# Patient Record
Sex: MALE | Race: WHITE | NOT HISPANIC OR LATINO | Employment: FULL TIME | URBAN - METROPOLITAN AREA
[De-identification: names, ages, dates, MRNs, and addresses within clinical notes are randomized per-mention and may not be internally consistent; named-entity substitution may affect disease eponyms.]

---

## 2018-11-21 ENCOUNTER — OFFICE VISIT (OUTPATIENT)
Dept: HEMATOLOGY/ONCOLOGY | Facility: CLINIC | Age: 53
End: 2018-11-21
Payer: COMMERCIAL

## 2018-11-21 VITALS
DIASTOLIC BLOOD PRESSURE: 89 MMHG | TEMPERATURE: 98 F | RESPIRATION RATE: 20 BRPM | HEART RATE: 85 BPM | BODY MASS INDEX: 28.08 KG/M2 | SYSTOLIC BLOOD PRESSURE: 129 MMHG | HEIGHT: 69 IN | WEIGHT: 189.63 LBS

## 2018-11-21 DIAGNOSIS — C61 PROSTATE CANCER METASTATIC TO INTRAPELVIC LYMPH NODE: ICD-10-CM

## 2018-11-21 DIAGNOSIS — C77.5 PROSTATE CANCER METASTATIC TO INTRAPELVIC LYMPH NODE: ICD-10-CM

## 2018-11-21 PROCEDURE — 99205 OFFICE O/P NEW HI 60 MIN: CPT | Mod: S$GLB,,, | Performed by: INTERNAL MEDICINE

## 2018-11-21 PROCEDURE — 99999 PR PBB SHADOW E&M-NEW PATIENT-LVL III: CPT | Mod: PBBFAC,,, | Performed by: INTERNAL MEDICINE

## 2018-11-21 RX ORDER — ABIRATERONE ACETATE 500 MG/1
TABLET, FILM COATED ORAL
COMMUNITY
Start: 2018-11-19 | End: 2019-04-03 | Stop reason: SDUPTHER

## 2018-11-21 RX ORDER — PREDNISONE 5 MG/1
TABLET ORAL
COMMUNITY
Start: 2018-10-26 | End: 2019-04-03 | Stop reason: SDUPTHER

## 2018-11-21 RX ORDER — TAMSULOSIN HYDROCHLORIDE 0.4 MG/1
CAPSULE ORAL
Refills: 11 | Status: ON HOLD | COMMUNITY
Start: 2018-11-12 | End: 2020-01-01

## 2018-11-21 NOTE — PROGRESS NOTES
"PATIENT: Martin Alves  MRN: [id number]  DATE: 11/21/2018        Chief Complaint: No chief complaint on file.      Oncologic History:      Oncologic History See below    Oncologic Treatment     Pathology       - 2015 having difficulty urinating- urgency and frequency  Saw Dr. Monson  PSA= 4.6 ng/ml and placed on Flomax  Advised to recheck in 1 month and it was 3.9 ng/ml  - early 2016 PSA- 2/16 = 7.8 ng/ml  - 2/16 prostate biopsy- told very aggressive Erika=9  Placed on lupron q 3 months at that time (last 10/18, due again 1/19)  MRI prostate  CT scans- seen in LNs and base of bladder  Bone scan- negative  - told inoperable  - referred to Dr. Kirkland, medical oncology  - port placed 3/21/16  Initiated Taxotere q 3 weeks x 6 (started on 3/25/16)  - PSA dropped significantly   - plan was to rescan and check PSA after 3 cycles- delays with insurance so done instead after 2 chemotherapy treatments in 5/19/2016  Told "cancer 98% dead" Lns back to normal size  - 5/26/18 PSA= 0.01 ng/ml  - 5/27/18 3rd cycle of chemo began  - tolerated chemotherapy well - did receive neupogen x 3 days post  - completed 6 cycles 7/29/16  - was doing scans q 6 months and blood work every 3 months  - Dr. Hoskins left [facility] May 2017- notified by letter  - Dr. Costello-   - 8/17 scans and labs stable  - Dr. Sethi was assigned most recently as medical oncologist  - July 2018 PSA= 1.68 ng/ml  - 10/1518 CT scans and bone scan  - 10/22/18 saw Dr. Sethi  PSA 1.58 ng/ml  LNs increased slightly on scans  Bone scan negative  Advised him to start Zytiga, started 11/6/18 (with Prednisone)  - concerned about medication and side effects  Feels like he did well on chemotherapy  Concerned about MD turnover  Sent to a chemo class and questions why for this regimen and then got a bill for a co-pay  - here for a second opinion    HM:  Colonoscopy neg 1 yr ago      Subjective:   Interval History: Mr. Alves is a 53 y.o. male who returns for new " evaluation and treatment of metastatic prostate cancer.     Past Medical History: History reviewed. No pertinent past medical history.    Past Surgical HIstory: History reviewed. No pertinent surgical history.    Family History: History reviewed. No pertinent family history.    Social History:      Allergies:  Review of patient's allergies indicates:  No Known Allergies    Medications:  No current outpatient medications on file.     No current facility-administered medications for this visit.    Zytiga and Prednisone    Review of Systems   Constitutional: Negative for activity change, appetite change, chills, fatigue, fever and unexpected weight change.   HENT: Negative for dental problem, hearing loss, mouth sores, postnasal drip, rhinorrhea, sinus pressure, sore throat, tinnitus, trouble swallowing and voice change.    Eyes: Negative for visual disturbance.   Respiratory: Negative for cough, shortness of breath and wheezing.    Cardiovascular: Negative for chest pain, palpitations and leg swelling.   Gastrointestinal: Negative for abdominal distention, abdominal pain, blood in stool, constipation, diarrhea, nausea and vomiting.   Endocrine: Negative for cold intolerance and heat intolerance.   Genitourinary: Negative for decreased urine volume, difficulty urinating, dysuria, frequency, hematuria and urgency.   Musculoskeletal: Negative for arthralgias, back pain, gait problem, joint swelling, myalgias, neck pain and neck stiffness.   Skin: Negative for color change, pallor, rash and wound.   Neurological: Negative for dizziness, weakness, light-headedness, numbness and headaches.   Hematological: Negative for adenopathy. Does not bruise/bleed easily.   Psychiatric/Behavioral: Negative for decreased concentration, dysphoric mood and sleep disturbance. The patient is not nervous/anxious.        ECOG Performance Status:   0    Objective:      Vitals: There were no vitals filed for this visit.  BMI: There is no height  or weight on file to calculate BMI.    Physical Exam   Constitutional: He is oriented to person, place, and time. He appears well-developed and well-nourished. No distress.   Presents with his wife   AARON:   Head: Normocephalic and atraumatic.   Eyes: Conjunctivae and EOM are normal. Pupils are equal, round, and reactive to light. Right eye exhibits no discharge. Left eye exhibits no discharge. No scleral icterus.   Neck: Normal range of motion. Neck supple. No tracheal deviation present. No thyromegaly present.   Cardiovascular: Normal rate, regular rhythm, normal heart sounds and intact distal pulses. Exam reveals no gallop and no friction rub.   No murmur heard.  Pulmonary/Chest: Effort normal and breath sounds normal. No respiratory distress. He has no wheezes. He has no rales. He exhibits no tenderness.   Abdominal: Soft. Bowel sounds are normal. He exhibits no distension and no mass. There is no tenderness. There is no rebound and no guarding.   No organomegaly   Musculoskeletal: Normal range of motion. He exhibits no edema, tenderness or deformity.   Lymphadenopathy:     He has no cervical adenopathy.   Neurological: He is alert and oriented to person, place, and time. He has normal reflexes. No cranial nerve deficit. He exhibits normal muscle tone. Coordination normal.   Skin: Skin is warm and dry. No rash noted. He is not diaphoretic. No erythema. No pallor.   Psychiatric: He has a normal mood and affect. His behavior is normal. Judgment and thought content normal.   Nursing note and vitals reviewed.    Laboratory Data:  No visits with results within 1 Week(s) from this visit.   Latest known visit with results is:   No results found for any previous visit.     He had some drawn yesterday at [email or web address] and will get us the results    Imaging: discussed as above  Assessment:     1. Prostate cancer metastatic to intrapelvic lymph node          Plan:     We reviewed hormonally sensitive versus hormonally resistant  prostate cancer  We reviewed appropriateness of therapy to date  He had early Taxotere exposure and an excellent response  Now with slowly rising PSA he has been started on Zytiga  We reviewed appropriateness of this choice and follow up  Side effects both short term and long term reviewed    Distress Screening Results: Psychosocial Distress screening score of Distress Score: 0 noted and reviewed. No intervention indicated.

## 2018-12-17 ENCOUNTER — TELEPHONE (OUTPATIENT)
Dept: HEMATOLOGY/ONCOLOGY | Facility: CLINIC | Age: 53
End: 2018-12-17

## 2018-12-17 NOTE — TELEPHONE ENCOUNTER
----- Message from Denisa Couch NP sent at 12/17/2018  2:14 PM CST -----  Contact: patient  Should be but let him know I will not have results for 2 weeks.     ----- Message -----  From: Tova Ortiz  Sent: 12/17/2018   1:50 PM  To: Denisa Couch NP    He is asking about his DEXA results. He is scheduled at 8 for it and seeing you at 10am. Normally that is enough time, right ?  ----- Message -----  From: Denisa Couch NP  Sent: 12/17/2018   1:05 PM  To: Tova Ortiz    Did you get this?    ----- Message -----  From: Kimmy Gold  Sent: 12/17/2018  10:23 AM  To: Denisa Couch NP    Patient needs to speak with the nurse about rescheduling his appointment with Ms Couch tomorrow he says they may not have results from test on the same day he was not sure of that.  Patient's # is [phone]

## 2018-12-17 NOTE — TELEPHONE ENCOUNTER
Returned call, spoke with patient and explained to him that the bone density results can be done that day or take up to 2 weeks depending on that department. He voiced understanding and decided just to keep apts as is.

## 2018-12-18 ENCOUNTER — OFFICE VISIT (OUTPATIENT)
Dept: HEMATOLOGY/ONCOLOGY | Facility: CLINIC | Age: 53
End: 2018-12-18
Payer: COMMERCIAL

## 2018-12-18 ENCOUNTER — HOSPITAL ENCOUNTER (OUTPATIENT)
Dept: RADIOLOGY | Facility: CLINIC | Age: 53
Discharge: HOME | End: 2018-12-18
Attending: INTERNAL MEDICINE
Payer: COMMERCIAL

## 2018-12-18 ENCOUNTER — LAB VISIT (OUTPATIENT)
Dept: LAB | Facility: HOSPITAL | Age: 53
End: 2018-12-18
Attending: INTERNAL MEDICINE
Payer: COMMERCIAL

## 2018-12-18 VITALS
HEART RATE: 68 BPM | WEIGHT: 189.38 LBS | RESPIRATION RATE: 20 BRPM | SYSTOLIC BLOOD PRESSURE: 126 MMHG | TEMPERATURE: 98 F | DIASTOLIC BLOOD PRESSURE: 82 MMHG | HEIGHT: 69 IN | BODY MASS INDEX: 28.05 KG/M2 | OXYGEN SATURATION: 96 %

## 2018-12-18 DIAGNOSIS — C61 PROSTATE CANCER METASTATIC TO INTRAPELVIC LYMPH NODE: ICD-10-CM

## 2018-12-18 DIAGNOSIS — C61 PROSTATE CANCER METASTATIC TO INTRAPELVIC LYMPH NODE: Primary | ICD-10-CM

## 2018-12-18 DIAGNOSIS — C77.5 PROSTATE CANCER METASTATIC TO INTRAPELVIC LYMPH NODE: ICD-10-CM

## 2018-12-18 DIAGNOSIS — C77.5 PROSTATE CANCER METASTATIC TO INTRAPELVIC LYMPH NODE: Primary | ICD-10-CM

## 2018-12-18 LAB
ALBUMIN SERPL BCP-MCNC: 3.8 G/DL
ALP SERPL-CCNC: 99 U/L
ALT SERPL W/O P-5'-P-CCNC: 32 U/L
ANION GAP SERPL CALC-SCNC: 8 MMOL/L
AST SERPL-CCNC: 22 U/L
BASOPHILS # BLD AUTO: 0.06 K/UL
BASOPHILS NFR BLD: 0.5 %
BILIRUB SERPL-MCNC: 0.6 MG/DL
BUN SERPL-MCNC: 13 MG/DL
CALCIUM SERPL-MCNC: 9.7 MG/DL
CHLORIDE SERPL-SCNC: 104 MMOL/L
CO2 SERPL-SCNC: 28 MMOL/L
COMPLEXED PSA SERPL-MCNC: 0.17 NG/ML
CREAT SERPL-MCNC: 0.8 MG/DL
DIFFERENTIAL METHOD: ABNORMAL
EOSINOPHIL # BLD AUTO: 0.4 K/UL
EOSINOPHIL NFR BLD: 3.1 %
ERYTHROCYTE [DISTWIDTH] IN BLOOD BY AUTOMATED COUNT: 15.8 %
EST. GFR  (AFRICAN AMERICAN): >60 ML/MIN/1.73 M^2
EST. GFR  (NON AFRICAN AMERICAN): >60 ML/MIN/1.73 M^2
GLUCOSE SERPL-MCNC: 81 MG/DL
HCT VFR BLD AUTO: 43.5 %
HGB BLD-MCNC: 14.5 G/DL
IMM GRANULOCYTES # BLD AUTO: 0.05 K/UL
IMM GRANULOCYTES NFR BLD AUTO: 0.4 %
LYMPHOCYTES # BLD AUTO: 2.9 K/UL
LYMPHOCYTES NFR BLD: 23.3 %
MCH RBC QN AUTO: 28.7 PG
MCHC RBC AUTO-ENTMCNC: 33.3 G/DL
MCV RBC AUTO: 86 FL
MONOCYTES # BLD AUTO: 1.1 K/UL
MONOCYTES NFR BLD: 8.6 %
NEUTROPHILS # BLD AUTO: 8 K/UL
NEUTROPHILS NFR BLD: 64.1 %
NRBC BLD-RTO: 0 /100 WBC
PLATELET # BLD AUTO: 259 K/UL
PMV BLD AUTO: 10.3 FL
POTASSIUM SERPL-SCNC: 4.1 MMOL/L
PROT SERPL-MCNC: 6.8 G/DL
RBC # BLD AUTO: 5.05 M/UL
SODIUM SERPL-SCNC: 140 MMOL/L
WBC # BLD AUTO: 12.41 K/UL

## 2018-12-18 PROCEDURE — 99999 PR PBB SHADOW E&M-EST. PATIENT-LVL III: CPT | Mod: PBBFAC,,, | Performed by: NURSE PRACTITIONER

## 2018-12-18 PROCEDURE — 77080 DXA BONE DENSITY AXIAL: CPT | Mod: TC

## 2018-12-18 PROCEDURE — 80053 COMPREHEN METABOLIC PANEL: CPT

## 2018-12-18 PROCEDURE — 85025 COMPLETE CBC W/AUTO DIFF WBC: CPT

## 2018-12-18 PROCEDURE — 3008F BODY MASS INDEX DOCD: CPT | Mod: CPTII,S$GLB,, | Performed by: NURSE PRACTITIONER

## 2018-12-18 PROCEDURE — 84153 ASSAY OF PSA TOTAL: CPT

## 2018-12-18 PROCEDURE — 36415 COLL VENOUS BLD VENIPUNCTURE: CPT

## 2018-12-18 PROCEDURE — 77080 DXA BONE DENSITY AXIAL: CPT | Mod: 26,,, | Performed by: INTERNAL MEDICINE

## 2018-12-18 PROCEDURE — 99214 OFFICE O/P EST MOD 30 MIN: CPT | Mod: S$GLB,,, | Performed by: NURSE PRACTITIONER

## 2018-12-18 NOTE — PROGRESS NOTES
"PATIENT: Martin Alves  MRN: [id number]  DATE: 12/18/2018        Chief Complaint: Prostate cancer metastatic to intrapelvic lymph node      Oncologic History:      Oncologic History See below    Oncologic Treatment     Pathology       - 2015 having difficulty urinating- urgency and frequency  Saw Dr. Monson  PSA= 4.6 ng/ml and placed on Flomax  Advised to recheck in 1 month and it was 3.9 ng/ml  - early 2016 PSA- 2/16 = 7.8 ng/ml  - 2/16 prostate biopsy- told very aggressive Erika=9  Placed on lupron q 3 months at that time (last 10/18, due again 1/19)  MRI prostate  CT scans- seen in LNs and base of bladder  Bone scan- negative  - told inoperable  - referred to Dr. Kirkland, medical oncology  - port placed 3/21/16  Initiated Taxotere q 3 weeks x 6 (started on 3/25/16)  - PSA dropped significantly   - plan was to rescan and check PSA after 3 cycles- delays with insurance so done instead after 2 chemotherapy treatments in 5/19/2016  Told "cancer 98% dead" Lns back to normal size  - 5/26/18 PSA= 0.01 ng/ml  - 5/27/18 3rd cycle of chemo began  - tolerated chemotherapy well - did receive neupogen x 3 days post  - completed 6 cycles 7/29/16  - was doing scans q 6 months and blood work every 3 months  - Dr. Hoskins left [facility] May 2017- notified by letter  - Dr. Costello-   - 8/17 scans and labs stable  - Dr. Sethi was assigned most recently as medical oncologist  - July 2018 PSA= 1.68 ng/ml  - 10/1518 CT scans and bone scan  - 10/22/18 saw Dr. Sethi  PSA 1.58 ng/ml  LNs increased slightly on scans  Bone scan negative  Advised him to start Zytiga, started 11/6/18 (with Prednisone)  - concerned about medication and side effects  Feels like he did well on chemotherapy  Concerned about MD turnover  Sent to a chemo class and questions why for this regimen and then got a bill for a co-pay  - came here for a second opinion    HM:  Colonoscopy neg 1 yr ago      Subjective:   Interval History: Mr. Alves is a 53 y.o. " male who returns for follow up and treatment of metastatic prostate cancer. He is tolerating the Zytiga and Prednisone well and without side effects. He is active and has good energy level. Recently went hunting in [place] for 2 weeks. No pain. Urinating well.      Past Medical History:   Past Medical History:   Diagnosis Date    Prostate cancer        Past Surgical HIstory:   Past Surgical History:   Procedure Laterality Date    PORTACATH PLACEMENT         Family History:   Family History   Problem Relation Age of Onset    Emphysema Mother     Heart attack Father     No Known Problems Brother     Cerebral aneurysm Maternal Aunt     No Known Problems Maternal Uncle     No Known Problems Paternal Aunt     No Known Problems Paternal Uncle     No Known Problems Maternal Grandmother     No Known Problems Maternal Grandfather     Colon cancer Paternal Grandmother     No Known Problems Paternal Grandfather     No Known Problems Brother     No Known Problems Son     No Known Problems Son        Social History:  reports that he quit smoking about 5 weeks ago. His smoking use included cigarettes. He started smoking about 40 years ago. He has a 20.00 pack-year smoking history. He quit smokeless tobacco use about 12 months ago. His smokeless tobacco use included chew. He reports that he drinks alcohol. He reports that he does not use drugs.    Allergies:  Review of patient's allergies indicates:  No Known Allergies    Medications:  Current Outpatient Medications   Medication Sig Dispense Refill    predniSONE (DELTASONE) 5 MG tablet       tamsulosin (FLOMAX) 0.4 mg Cap TK 1 C PO QD FOR BLADDER  11    ZYTIGA 500 mg Tab        No current facility-administered medications for this visit.    Zytiga and Prednisone    Review of Systems   Constitutional: Negative for activity change, appetite change, chills, fatigue, fever and unexpected weight change.   HENT: Negative for dental problem, hearing loss, mouth sores,  "postnasal drip, rhinorrhea, sinus pressure, sore throat, tinnitus, trouble swallowing and voice change.    Eyes: Negative for visual disturbance.   Respiratory: Negative for cough, shortness of breath and wheezing.    Cardiovascular: Negative for chest pain, palpitations and leg swelling.   Gastrointestinal: Negative for abdominal distention, abdominal pain, blood in stool, constipation, diarrhea, nausea and vomiting.   Endocrine: Negative for cold intolerance and heat intolerance.   Genitourinary: Negative for decreased urine volume, difficulty urinating, dysuria, frequency, hematuria and urgency.   Musculoskeletal: Negative for arthralgias, back pain, gait problem, joint swelling, myalgias, neck pain and neck stiffness.   Skin: Negative for color change, pallor, rash and wound.   Neurological: Negative for dizziness, weakness, light-headedness, numbness and headaches.   Hematological: Negative for adenopathy. Does not bruise/bleed easily.   Psychiatric/Behavioral: Negative for decreased concentration, dysphoric mood and sleep disturbance. The patient is not nervous/anxious.        ECOG Performance Status:   0    Objective:      Vitals:   Vitals:    12/18/18 0943   BP: 126/82   BP Location: Left arm   Patient Position: Sitting   BP Method: Medium (Automatic)   Pulse: 68   Resp: 20   Temp: 98 °F (36.7 °C)   TempSrc: Oral   SpO2: 96%   Weight: 85.9 kg (189 lb 6 oz)   Height: 5' 9" (1.753 m)     BMI: Body mass index is 27.97 kg/m².    Physical Exam   Constitutional: He is oriented to person, place, and time. He appears well-developed and well-nourished. No distress.   Presents alone.   HENT:   Head: Normocephalic and atraumatic.   Eyes: Conjunctivae and EOM are normal. Pupils are equal, round, and reactive to light. Right eye exhibits no discharge. Left eye exhibits no discharge. No scleral icterus.   Neck: Normal range of motion. Neck supple. No tracheal deviation present. No thyromegaly present.   Cardiovascular: " Normal rate, regular rhythm, normal heart sounds and intact distal pulses. Exam reveals no gallop and no friction rub.   No murmur heard.  Pulmonary/Chest: Effort normal and breath sounds normal. No respiratory distress. He has no wheezes. He has no rales. He exhibits no tenderness.   Abdominal: Soft. Bowel sounds are normal. He exhibits no distension and no mass. There is no tenderness. There is no rebound and no guarding.   No organomegaly   Musculoskeletal: Normal range of motion. He exhibits no edema, tenderness or deformity.   Lymphadenopathy:     He has no cervical adenopathy.   Neurological: He is alert and oriented to person, place, and time. He has normal reflexes. No cranial nerve deficit. He exhibits normal muscle tone. Coordination normal.   Skin: Skin is warm and dry. No rash noted. He is not diaphoretic. No erythema. No pallor.   Psychiatric: He has a normal mood and affect. His behavior is normal. Judgment and thought content normal.   Nursing note and vitals reviewed.    Laboratory Data:  Lab Visit on 12/18/2018   Component Date Value Ref Range Status    WBC 12/18/2018 12.41  3.90 - 12.70 K/uL Final    RBC 12/18/2018 5.05  4.60 - 6.20 M/uL Final    Hemoglobin 12/18/2018 14.5  14.0 - 18.0 g/dL Final    Hematocrit 12/18/2018 43.5  40.0 - 54.0 % Final    MCV 12/18/2018 86  82 - 98 fL Final    MCH 12/18/2018 28.7  27.0 - 31.0 pg Final    MCHC 12/18/2018 33.3  32.0 - 36.0 g/dL Final    RDW 12/18/2018 15.8* 11.5 - 14.5 % Final    Platelets 12/18/2018 259  150 - 350 K/uL Final    MPV 12/18/2018 10.3  9.2 - 12.9 fL Final    Immature Granulocytes 12/18/2018 0.4  0.0 - 0.5 % Final    Gran # (ANC) 12/18/2018 8.0* 1.8 - 7.7 K/uL Final    Immature Grans (Abs) 12/18/2018 0.05* 0.00 - 0.04 K/uL Final    Comment: Mild elevation in immature granulocytes is non specific and   can be seen in a variety of conditions including stress response,   acute inflammation, trauma and pregnancy. Correlation with  other   laboratory and clinical findings is essential.      Lymph # 12/18/2018 2.9  1.0 - 4.8 K/uL Final    Mono # 12/18/2018 1.1* 0.3 - 1.0 K/uL Final    Eos # 12/18/2018 0.4  0.0 - 0.5 K/uL Final    Baso # 12/18/2018 0.06  0.00 - 0.20 K/uL Final    nRBC 12/18/2018 0  0 /100 WBC Final    Gran% 12/18/2018 64.1  38.0 - 73.0 % Final    Lymph% 12/18/2018 23.3  18.0 - 48.0 % Final    Mono% 12/18/2018 8.6  4.0 - 15.0 % Final    Eosinophil% 12/18/2018 3.1  0.0 - 8.0 % Final    Basophil% 12/18/2018 0.5  0.0 - 1.9 % Final    Differential Method 12/18/2018 Automated   Final    Sodium 12/18/2018 140  136 - 145 mmol/L Final    Potassium 12/18/2018 4.1  3.5 - 5.1 mmol/L Final    Chloride 12/18/2018 104  95 - 110 mmol/L Final    CO2 12/18/2018 28  23 - 29 mmol/L Final    Glucose 12/18/2018 81  70 - 110 mg/dL Final    BUN, Bld 12/18/2018 13  6 - 20 mg/dL Final    Creatinine 12/18/2018 0.8  0.5 - 1.4 mg/dL Final    Calcium 12/18/2018 9.7  8.7 - 10.5 mg/dL Final    Total Protein 12/18/2018 6.8  6.0 - 8.4 g/dL Final    Albumin 12/18/2018 3.8  3.5 - 5.2 g/dL Final    Total Bilirubin 12/18/2018 0.6  0.1 - 1.0 mg/dL Final    Comment: For infants and newborns, interpretation of results should be based  on gestational age, weight and in agreement with clinical  observations.  Premature Infant recommended reference ranges:  Up to 24 hours.............<8.0 mg/dL  Up to 48 hours............<12.0 mg/dL  3-5 days..................<15.0 mg/dL  6-29 days.................<15.0 mg/dL      Alkaline Phosphatase 12/18/2018 99  55 - 135 U/L Final    AST 12/18/2018 22  10 - 40 U/L Final    ALT 12/18/2018 32  10 - 44 U/L Final    Anion Gap 12/18/2018 8  8 - 16 mmol/L Final    eGFR if African American 12/18/2018 >60.0  >60 mL/min/1.73 m^2 Final    eGFR if non African American 12/18/2018 >60.0  >60 mL/min/1.73 m^2 Final    Comment: Calculation used to obtain the estimated glomerular filtration  rate (eGFR) is the CKD-EPI  equation.       PSA DIAGNOSTIC 12/18/2018 0.17  0.00 - 4.00 ng/mL Final    Comment: PSA Expected levels:  Hormonal Therapy: <0.05 ng/ml  Prostatectomy: <0.01 ng/ml  Radiation Therapy: <1.00 ng/ml           Imaging: discussed as above    Assessment:     1. Prostate cancer metastatic to intrapelvic lymph node        Plan:       Patient doing well.   Labs reviewed. PSA 2.9 > 0.17 today.   Encouraged to continue Zytiga and Prednisone as prescribed.   He receives Lupron every 3 months per Urology at . Next due in January 2019.  RTC in 4 weeks with a CBC, CMP, PSA and to see me or Dr. Escobar.     Patient is in agreement with the proposed treatment plan. All questions were answered to the patient's satisfaction. Pt knows to call clinic for any new or worsening symptoms and if anything is needed before the next clinic visit.      Denisa Couch, JOSELYNP-C  Hematology & Oncology  [facility] [place] [address]  ph. [phone]  Fax. [phone]     I spent 30 minutes (face to face) with the patient, more than 50% was in counseling and coordination of care as detailed above.     Distress Screening Results: Psychosocial Distress screening score of Distress Score: 0 noted and reviewed. No intervention indicated.

## 2019-01-01 ENCOUNTER — TELEPHONE (OUTPATIENT)
Dept: HEMATOLOGY/ONCOLOGY | Facility: CLINIC | Age: 54
End: 2019-01-01

## 2019-01-01 ENCOUNTER — OFFICE VISIT (OUTPATIENT)
Dept: HEMATOLOGY/ONCOLOGY | Facility: CLINIC | Age: 54
End: 2019-01-01
Payer: COMMERCIAL

## 2019-01-01 ENCOUNTER — PATIENT MESSAGE (OUTPATIENT)
Dept: HEMATOLOGY/ONCOLOGY | Facility: CLINIC | Age: 54
End: 2019-01-01

## 2019-01-01 ENCOUNTER — HOSPITAL ENCOUNTER (OUTPATIENT)
Dept: RADIOLOGY | Facility: HOSPITAL | Age: 54
Discharge: HOME | End: 2019-11-11
Attending: INTERNAL MEDICINE
Payer: COMMERCIAL

## 2019-01-01 ENCOUNTER — LAB VISIT (OUTPATIENT)
Dept: LAB | Facility: HOSPITAL | Age: 54
End: 2019-01-01
Attending: INTERNAL MEDICINE
Payer: COMMERCIAL

## 2019-01-01 ENCOUNTER — INFUSION (OUTPATIENT)
Dept: INFUSION THERAPY | Facility: HOSPITAL | Age: 54
End: 2019-01-01
Attending: INTERNAL MEDICINE
Payer: COMMERCIAL

## 2019-01-01 VITALS
OXYGEN SATURATION: 95 % | HEIGHT: 71 IN | WEIGHT: 192.88 LBS | TEMPERATURE: 98 F | RESPIRATION RATE: 18 BRPM | BODY MASS INDEX: 27 KG/M2 | HEART RATE: 81 BPM | DIASTOLIC BLOOD PRESSURE: 89 MMHG | SYSTOLIC BLOOD PRESSURE: 126 MMHG

## 2019-01-01 VITALS
RESPIRATION RATE: 18 BRPM | SYSTOLIC BLOOD PRESSURE: 124 MMHG | DIASTOLIC BLOOD PRESSURE: 82 MMHG | WEIGHT: 190.06 LBS | TEMPERATURE: 99 F | HEIGHT: 71 IN | HEART RATE: 68 BPM | BODY MASS INDEX: 26.61 KG/M2

## 2019-01-01 VITALS
TEMPERATURE: 98 F | WEIGHT: 192.88 LBS | OXYGEN SATURATION: 95 % | RESPIRATION RATE: 16 BRPM | HEIGHT: 69 IN | SYSTOLIC BLOOD PRESSURE: 128 MMHG | BODY MASS INDEX: 28.57 KG/M2 | HEART RATE: 76 BPM | DIASTOLIC BLOOD PRESSURE: 88 MMHG

## 2019-01-01 VITALS
BODY MASS INDEX: 28.57 KG/M2 | TEMPERATURE: 98 F | SYSTOLIC BLOOD PRESSURE: 126 MMHG | HEIGHT: 69 IN | DIASTOLIC BLOOD PRESSURE: 84 MMHG | OXYGEN SATURATION: 99 % | WEIGHT: 192.88 LBS | RESPIRATION RATE: 16 BRPM | HEART RATE: 80 BPM

## 2019-01-01 VITALS
BODY MASS INDEX: 26.66 KG/M2 | TEMPERATURE: 98 F | WEIGHT: 191.13 LBS | HEART RATE: 78 BPM | SYSTOLIC BLOOD PRESSURE: 123 MMHG | DIASTOLIC BLOOD PRESSURE: 91 MMHG | RESPIRATION RATE: 20 BRPM

## 2019-01-01 VITALS
TEMPERATURE: 99 F | DIASTOLIC BLOOD PRESSURE: 83 MMHG | BODY MASS INDEX: 26.61 KG/M2 | RESPIRATION RATE: 18 BRPM | HEART RATE: 91 BPM | OXYGEN SATURATION: 95 % | HEIGHT: 71 IN | WEIGHT: 190.06 LBS | SYSTOLIC BLOOD PRESSURE: 122 MMHG

## 2019-01-01 VITALS
HEART RATE: 77 BPM | TEMPERATURE: 99 F | WEIGHT: 189.81 LBS | HEIGHT: 71 IN | DIASTOLIC BLOOD PRESSURE: 88 MMHG | OXYGEN SATURATION: 96 % | SYSTOLIC BLOOD PRESSURE: 126 MMHG | RESPIRATION RATE: 18 BRPM | BODY MASS INDEX: 26.57 KG/M2

## 2019-01-01 VITALS
DIASTOLIC BLOOD PRESSURE: 91 MMHG | HEART RATE: 88 BPM | SYSTOLIC BLOOD PRESSURE: 127 MMHG | RESPIRATION RATE: 18 BRPM | TEMPERATURE: 99 F

## 2019-01-01 VITALS
BODY MASS INDEX: 26.76 KG/M2 | SYSTOLIC BLOOD PRESSURE: 122 MMHG | HEART RATE: 63 BPM | DIASTOLIC BLOOD PRESSURE: 84 MMHG | WEIGHT: 191.13 LBS | RESPIRATION RATE: 18 BRPM | TEMPERATURE: 98 F | HEIGHT: 71 IN

## 2019-01-01 DIAGNOSIS — C79.51 PROSTATE CANCER METASTATIC TO BONE: Primary | ICD-10-CM

## 2019-01-01 DIAGNOSIS — C77.5 PROSTATE CANCER METASTATIC TO INTRAPELVIC LYMPH NODE: Primary | ICD-10-CM

## 2019-01-01 DIAGNOSIS — C79.51 BONE METASTASES: ICD-10-CM

## 2019-01-01 DIAGNOSIS — D64.81 ANTINEOPLASTIC CHEMOTHERAPY INDUCED ANEMIA: ICD-10-CM

## 2019-01-01 DIAGNOSIS — C61 PROSTATE CANCER METASTATIC TO BONE: ICD-10-CM

## 2019-01-01 DIAGNOSIS — T45.1X5A ANTINEOPLASTIC CHEMOTHERAPY INDUCED ANEMIA: ICD-10-CM

## 2019-01-01 DIAGNOSIS — C61 PROSTATE CANCER METASTATIC TO MULTIPLE SITES: Primary | ICD-10-CM

## 2019-01-01 DIAGNOSIS — C61 PROSTATE CANCER METASTATIC TO MULTIPLE SITES: ICD-10-CM

## 2019-01-01 DIAGNOSIS — C61 PROSTATE CANCER METASTATIC TO INTRAPELVIC LYMPH NODE: Primary | ICD-10-CM

## 2019-01-01 DIAGNOSIS — C61 PROSTATE CANCER METASTATIC TO BONE: Primary | ICD-10-CM

## 2019-01-01 DIAGNOSIS — G89.3 NEOPLASM RELATED PAIN: ICD-10-CM

## 2019-01-01 DIAGNOSIS — C79.51 PROSTATE CANCER METASTATIC TO BONE: ICD-10-CM

## 2019-01-01 LAB
ALBUMIN SERPL BCP-MCNC: 3.8 G/DL (ref 3.5–5.2)
ALBUMIN SERPL BCP-MCNC: 3.9 G/DL (ref 3.5–5.2)
ALP SERPL-CCNC: 140 U/L (ref 55–135)
ALP SERPL-CCNC: 182 U/L (ref 55–135)
ALT SERPL W/O P-5'-P-CCNC: 16 U/L (ref 10–44)
ALT SERPL W/O P-5'-P-CCNC: 21 U/L (ref 10–44)
ANION GAP SERPL CALC-SCNC: 7 MMOL/L (ref 8–16)
ANION GAP SERPL CALC-SCNC: 7 MMOL/L (ref 8–16)
AST SERPL-CCNC: 21 U/L (ref 10–40)
AST SERPL-CCNC: 21 U/L (ref 10–40)
BASOPHILS # BLD AUTO: 0.05 K/UL (ref 0–0.2)
BASOPHILS # BLD AUTO: 0.06 K/UL (ref 0–0.2)
BASOPHILS NFR BLD: 0.5 % (ref 0–1.9)
BASOPHILS NFR BLD: 0.5 % (ref 0–1.9)
BILIRUB SERPL-MCNC: 0.3 MG/DL (ref 0.1–1)
BILIRUB SERPL-MCNC: 0.3 MG/DL (ref 0.1–1)
BUN SERPL-MCNC: 11 MG/DL (ref 6–20)
BUN SERPL-MCNC: 12 MG/DL (ref 6–20)
CALCIUM SERPL-MCNC: 9.1 MG/DL (ref 8.7–10.5)
CALCIUM SERPL-MCNC: 9.3 MG/DL (ref 8.7–10.5)
CHLORIDE SERPL-SCNC: 105 MMOL/L (ref 95–110)
CHLORIDE SERPL-SCNC: 109 MMOL/L (ref 95–110)
CO2 SERPL-SCNC: 27 MMOL/L (ref 23–29)
CO2 SERPL-SCNC: 27 MMOL/L (ref 23–29)
COMPLEXED PSA SERPL-MCNC: 3.7 NG/ML (ref 0–4)
CREAT SERPL-MCNC: 0.9 MG/DL (ref 0.5–1.4)
CREAT SERPL-MCNC: 0.9 MG/DL (ref 0.5–1.4)
DIFFERENTIAL METHOD: ABNORMAL
DIFFERENTIAL METHOD: ABNORMAL
EOSINOPHIL # BLD AUTO: 0.2 K/UL (ref 0–0.5)
EOSINOPHIL # BLD AUTO: 0.3 K/UL (ref 0–0.5)
EOSINOPHIL NFR BLD: 2.2 % (ref 0–8)
EOSINOPHIL NFR BLD: 2.3 % (ref 0–8)
ERYTHROCYTE [DISTWIDTH] IN BLOOD BY AUTOMATED COUNT: 13.9 % (ref 11.5–14.5)
ERYTHROCYTE [DISTWIDTH] IN BLOOD BY AUTOMATED COUNT: 14.2 % (ref 11.5–14.5)
EST. GFR  (AFRICAN AMERICAN): >60 ML/MIN/1.73 M^2
EST. GFR  (AFRICAN AMERICAN): >60 ML/MIN/1.73 M^2
EST. GFR  (NON AFRICAN AMERICAN): >60 ML/MIN/1.73 M^2
EST. GFR  (NON AFRICAN AMERICAN): >60 ML/MIN/1.73 M^2
GLUCOSE SERPL-MCNC: 90 MG/DL (ref 70–110)
GLUCOSE SERPL-MCNC: 92 MG/DL (ref 70–110)
HCT VFR BLD AUTO: 38.8 % (ref 40–54)
HCT VFR BLD AUTO: 43.2 % (ref 40–54)
HGB BLD-MCNC: 12.6 G/DL (ref 14–18)
HGB BLD-MCNC: 13.6 G/DL (ref 14–18)
IMM GRANULOCYTES # BLD AUTO: 0.05 K/UL (ref 0–0.04)
IMM GRANULOCYTES # BLD AUTO: 0.07 K/UL (ref 0–0.04)
IMM GRANULOCYTES NFR BLD AUTO: 0.4 % (ref 0–0.5)
IMM GRANULOCYTES NFR BLD AUTO: 0.7 % (ref 0–0.5)
LYMPHOCYTES # BLD AUTO: 2.6 K/UL (ref 1–4.8)
LYMPHOCYTES # BLD AUTO: 2.7 K/UL (ref 1–4.8)
LYMPHOCYTES NFR BLD: 23.5 % (ref 18–48)
LYMPHOCYTES NFR BLD: 26.4 % (ref 18–48)
MCH RBC QN AUTO: 27.3 PG (ref 27–31)
MCH RBC QN AUTO: 27.5 PG (ref 27–31)
MCHC RBC AUTO-ENTMCNC: 31.5 G/DL (ref 32–36)
MCHC RBC AUTO-ENTMCNC: 32.5 G/DL (ref 32–36)
MCV RBC AUTO: 84 FL (ref 82–98)
MCV RBC AUTO: 87 FL (ref 82–98)
MONOCYTES # BLD AUTO: 0.7 K/UL (ref 0.3–1)
MONOCYTES # BLD AUTO: 0.7 K/UL (ref 0.3–1)
MONOCYTES NFR BLD: 6.5 % (ref 4–15)
MONOCYTES NFR BLD: 6.7 % (ref 4–15)
NEUTROPHILS # BLD AUTO: 6.2 K/UL (ref 1.8–7.7)
NEUTROPHILS # BLD AUTO: 7.7 K/UL (ref 1.8–7.7)
NEUTROPHILS NFR BLD: 63.5 % (ref 38–73)
NEUTROPHILS NFR BLD: 66.8 % (ref 38–73)
NRBC BLD-RTO: 0 /100 WBC
NRBC BLD-RTO: 0 /100 WBC
PLATELET # BLD AUTO: 249 K/UL (ref 150–350)
PLATELET # BLD AUTO: 277 K/UL (ref 150–350)
PMV BLD AUTO: 10 FL (ref 9.2–12.9)
PMV BLD AUTO: 9.7 FL (ref 9.2–12.9)
POTASSIUM SERPL-SCNC: 4.2 MMOL/L (ref 3.5–5.1)
POTASSIUM SERPL-SCNC: 4.2 MMOL/L (ref 3.5–5.1)
PROT SERPL-MCNC: 6.5 G/DL (ref 6–8.4)
PROT SERPL-MCNC: 6.8 G/DL (ref 6–8.4)
RBC # BLD AUTO: 4.62 M/UL (ref 4.6–6.2)
RBC # BLD AUTO: 4.95 M/UL (ref 4.6–6.2)
SODIUM SERPL-SCNC: 139 MMOL/L (ref 136–145)
SODIUM SERPL-SCNC: 143 MMOL/L (ref 136–145)
WBC # BLD AUTO: 11.46 K/UL (ref 3.9–12.7)
WBC # BLD AUTO: 9.73 K/UL (ref 3.9–12.7)

## 2019-01-01 PROCEDURE — 96367 TX/PROPH/DG ADDL SEQ IV INF: CPT

## 2019-01-01 PROCEDURE — 3008F BODY MASS INDEX DOCD: CPT | Mod: CPTII,S$GLB,, | Performed by: NURSE PRACTITIONER

## 2019-01-01 PROCEDURE — 99214 PR OFFICE/OUTPT VISIT, EST, LEVL IV, 30-39 MIN: ICD-10-PCS | Mod: S$GLB,,, | Performed by: INTERNAL MEDICINE

## 2019-01-01 PROCEDURE — 96413 CHEMO IV INFUSION 1 HR: CPT

## 2019-01-01 PROCEDURE — 25000003 PHARM REV CODE 250: Performed by: INTERNAL MEDICINE

## 2019-01-01 PROCEDURE — 84153 ASSAY OF PSA TOTAL: CPT

## 2019-01-01 PROCEDURE — 99214 PR OFFICE/OUTPT VISIT, EST, LEVL IV, 30-39 MIN: ICD-10-PCS | Mod: S$GLB,,, | Performed by: NURSE PRACTITIONER

## 2019-01-01 PROCEDURE — 99214 OFFICE O/P EST MOD 30 MIN: CPT | Mod: S$GLB,,, | Performed by: NURSE PRACTITIONER

## 2019-01-01 PROCEDURE — 78306 NM BONE SCAN WHOLE BODY: ICD-10-PCS | Mod: 26,,, | Performed by: RADIOLOGY

## 2019-01-01 PROCEDURE — 99999 PR PBB SHADOW E&M-EST. PATIENT-LVL III: ICD-10-PCS | Mod: PBBFAC,,, | Performed by: NURSE PRACTITIONER

## 2019-01-01 PROCEDURE — 99999 PR PBB SHADOW E&M-EST. PATIENT-LVL III: ICD-10-PCS | Mod: PBBFAC,,, | Performed by: INTERNAL MEDICINE

## 2019-01-01 PROCEDURE — 99999 PR PBB SHADOW E&M-EST. PATIENT-LVL IV: ICD-10-PCS | Mod: PBBFAC,,, | Performed by: INTERNAL MEDICINE

## 2019-01-01 PROCEDURE — 99214 OFFICE O/P EST MOD 30 MIN: CPT | Mod: S$GLB,,, | Performed by: INTERNAL MEDICINE

## 2019-01-01 PROCEDURE — 63600175 PHARM REV CODE 636 W HCPCS: Performed by: INTERNAL MEDICINE

## 2019-01-01 PROCEDURE — 3008F PR BODY MASS INDEX (BMI) DOCUMENTED: ICD-10-PCS | Mod: CPTII,S$GLB,, | Performed by: NURSE PRACTITIONER

## 2019-01-01 PROCEDURE — 71260 CT THORAX DX C+: CPT | Mod: 26,,, | Performed by: INTERNAL MEDICINE

## 2019-01-01 PROCEDURE — 80053 COMPREHEN METABOLIC PANEL: CPT

## 2019-01-01 PROCEDURE — 85025 COMPLETE CBC W/AUTO DIFF WBC: CPT

## 2019-01-01 PROCEDURE — A9503 TC99M MEDRONATE: HCPCS

## 2019-01-01 PROCEDURE — 78306 BONE IMAGING WHOLE BODY: CPT | Mod: 26,,, | Performed by: RADIOLOGY

## 2019-01-01 PROCEDURE — 36415 COLL VENOUS BLD VENIPUNCTURE: CPT

## 2019-01-01 PROCEDURE — 99999 PR PBB SHADOW E&M-EST. PATIENT-LVL III: CPT | Mod: PBBFAC,,, | Performed by: NURSE PRACTITIONER

## 2019-01-01 PROCEDURE — 96375 TX/PRO/DX INJ NEW DRUG ADDON: CPT

## 2019-01-01 PROCEDURE — 74177 CT ABD & PELVIS W/CONTRAST: CPT | Mod: 26,,, | Performed by: INTERNAL MEDICINE

## 2019-01-01 PROCEDURE — 74177 CT ABD & PELVIS W/CONTRAST: CPT | Mod: TC

## 2019-01-01 PROCEDURE — 3008F PR BODY MASS INDEX (BMI) DOCUMENTED: ICD-10-PCS | Mod: CPTII,S$GLB,, | Performed by: INTERNAL MEDICINE

## 2019-01-01 PROCEDURE — 71260 CT CHEST ABDOMEN PELVIS WITH CONTRAST (XPD): ICD-10-PCS | Mod: 26,,, | Performed by: INTERNAL MEDICINE

## 2019-01-01 PROCEDURE — S0028 INJECTION, FAMOTIDINE, 20 MG: HCPCS | Performed by: INTERNAL MEDICINE

## 2019-01-01 PROCEDURE — 3008F BODY MASS INDEX DOCD: CPT | Mod: CPTII,S$GLB,, | Performed by: INTERNAL MEDICINE

## 2019-01-01 PROCEDURE — 99999 PR PBB SHADOW E&M-EST. PATIENT-LVL III: CPT | Mod: PBBFAC,,, | Performed by: INTERNAL MEDICINE

## 2019-01-01 PROCEDURE — 99999 PR PBB SHADOW E&M-EST. PATIENT-LVL IV: CPT | Mod: PBBFAC,,, | Performed by: INTERNAL MEDICINE

## 2019-01-01 PROCEDURE — 25500020 PHARM REV CODE 255: Performed by: INTERNAL MEDICINE

## 2019-01-01 PROCEDURE — 74177 CT CHEST ABDOMEN PELVIS WITH CONTRAST (XPD): ICD-10-PCS | Mod: 26,,, | Performed by: INTERNAL MEDICINE

## 2019-01-01 RX ORDER — HEPARIN 100 UNIT/ML
500 SYRINGE INTRAVENOUS
Status: CANCELLED | OUTPATIENT
Start: 2019-01-01

## 2019-01-01 RX ORDER — HEPARIN 100 UNIT/ML
500 SYRINGE INTRAVENOUS
Status: DISCONTINUED | OUTPATIENT
Start: 2019-01-01 | End: 2019-01-01 | Stop reason: HOSPADM

## 2019-01-01 RX ORDER — FAMOTIDINE 10 MG/ML
20 INJECTION INTRAVENOUS ONCE
Status: COMPLETED | OUTPATIENT
Start: 2019-01-01 | End: 2019-01-01

## 2019-01-01 RX ORDER — SODIUM CHLORIDE 0.9 % (FLUSH) 0.9 %
10 SYRINGE (ML) INJECTION
Status: DISCONTINUED | OUTPATIENT
Start: 2019-01-01 | End: 2019-01-01 | Stop reason: HOSPADM

## 2019-01-01 RX ORDER — DIPHENHYDRAMINE HCL 25 MG
25 CAPSULE ORAL
Status: CANCELLED
Start: 2019-01-01

## 2019-01-01 RX ORDER — DIPHENHYDRAMINE HCL 25 MG
25 CAPSULE ORAL
Status: COMPLETED | OUTPATIENT
Start: 2019-01-01 | End: 2019-01-01

## 2019-01-01 RX ORDER — DIPHENHYDRAMINE HYDROCHLORIDE 50 MG/ML
25 INJECTION INTRAMUSCULAR; INTRAVENOUS ONCE
Status: CANCELLED
Start: 2019-01-01

## 2019-01-01 RX ORDER — SODIUM CHLORIDE 0.9 % (FLUSH) 0.9 %
10 SYRINGE (ML) INJECTION
Status: CANCELLED | OUTPATIENT
Start: 2019-01-01

## 2019-01-01 RX ORDER — FAMOTIDINE 10 MG/ML
20 INJECTION INTRAVENOUS
Status: COMPLETED | OUTPATIENT
Start: 2019-01-01 | End: 2019-01-01

## 2019-01-01 RX ORDER — DIPHENHYDRAMINE HYDROCHLORIDE 50 MG/ML
25 INJECTION INTRAMUSCULAR; INTRAVENOUS ONCE
Status: COMPLETED | OUTPATIENT
Start: 2019-01-01 | End: 2019-01-01

## 2019-01-01 RX ADMIN — FAMOTIDINE 20 MG: 10 INJECTION, SOLUTION INTRAVENOUS at 02:12

## 2019-01-01 RX ADMIN — FAMOTIDINE 20 MG: 10 INJECTION, SOLUTION INTRAVENOUS at 08:12

## 2019-01-01 RX ADMIN — DEXAMETHASONE SODIUM PHOSPHATE 20 MG: 4 INJECTION, SOLUTION INTRA-ARTICULAR; INTRALESIONAL; INTRAMUSCULAR; INTRAVENOUS; SOFT TISSUE at 08:12

## 2019-01-01 RX ADMIN — IOHEXOL 15 ML: 350 INJECTION, SOLUTION INTRAVENOUS at 02:11

## 2019-01-01 RX ADMIN — DIPHENHYDRAMINE HYDROCHLORIDE 25 MG: 50 INJECTION INTRAMUSCULAR; INTRAVENOUS at 02:12

## 2019-01-01 RX ADMIN — SODIUM CHLORIDE: 9 INJECTION, SOLUTION INTRAVENOUS at 12:12

## 2019-01-01 RX ADMIN — DOCETAXEL ANHYDROUS 60 MG: 10 INJECTION, SOLUTION INTRAVENOUS at 04:12

## 2019-01-01 RX ADMIN — SODIUM CHLORIDE: 0.9 INJECTION, SOLUTION INTRAVENOUS at 08:12

## 2019-01-01 RX ADMIN — DIPHENHYDRAMINE HYDROCHLORIDE 25 MG: 25 CAPSULE ORAL at 04:12

## 2019-01-01 RX ADMIN — Medication 20 MG: at 02:12

## 2019-01-01 RX ADMIN — DIPHENHYDRAMINE HYDROCHLORIDE 25 MG: 25 CAPSULE ORAL at 12:12

## 2019-01-01 RX ADMIN — SODIUM CHLORIDE: 0.9 INJECTION, SOLUTION INTRAVENOUS at 02:12

## 2019-01-01 RX ADMIN — DOCETAXEL ANHYDROUS 60 MG: 10 INJECTION, SOLUTION INTRAVENOUS at 09:12

## 2019-01-01 RX ADMIN — DOCETAXEL ANHYDROUS 60 MG: 10 INJECTION, SOLUTION INTRAVENOUS at 02:12

## 2019-01-01 RX ADMIN — IOHEXOL 100 ML: 350 INJECTION, SOLUTION INTRAVENOUS at 01:11

## 2019-01-01 RX ADMIN — FAMOTIDINE 20 MG: 10 INJECTION, SOLUTION INTRAVENOUS at 04:12

## 2019-01-01 RX ADMIN — DOCETAXEL ANHYDROUS 60 MG: 10 INJECTION, SOLUTION INTRAVENOUS at 01:12

## 2019-01-01 RX ADMIN — FAMOTIDINE 20 MG: 10 INJECTION, SOLUTION INTRAVENOUS at 12:12

## 2019-01-01 RX ADMIN — SODIUM CHLORIDE: 0.9 INJECTION, SOLUTION INTRAVENOUS at 04:12

## 2019-01-01 RX ADMIN — DIPHENHYDRAMINE HYDROCHLORIDE 25 MG: 25 CAPSULE ORAL at 08:12

## 2019-01-01 RX ADMIN — DEXAMETHASONE SODIUM PHOSPHATE 20 MG: 4 INJECTION, SOLUTION INTRA-ARTICULAR; INTRALESIONAL; INTRAMUSCULAR; INTRAVENOUS; SOFT TISSUE at 12:12

## 2019-01-01 RX ADMIN — DEXAMETHASONE SODIUM PHOSPHATE 20 MG: 4 INJECTION, SOLUTION INTRA-ARTICULAR; INTRALESIONAL; INTRAMUSCULAR; INTRAVENOUS; SOFT TISSUE at 04:12

## 2019-01-18 NOTE — PROGRESS NOTES
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 Monson  PSA= 4.6 ng/ml and placed on Flomax  Advised to recheck in 1 month and it was 3.9 ng/ml  - early 2016 PSA- 2/16 = 7.8 ng/ml  - 2/16 prostate biopsy- told very aggressive Erika=9  Placed on lupron q 3 months at that time (last 10/18, due again 1/19)  MRI prostate  CT scans- seen in LNs and base of bladder  Bone scan- negative  - told inoperable  - referred to Dr. Kirkland, medical oncology  - port placed 3/21/16  Initiated Taxotere q 3 weeks x 6 (started on 3/25/16)  - PSA dropped significantly   - plan was to rescan and check PSA after 3 cycles- delays with insurance so done instead after 2 chemotherapy treatments in 5/19/2016  Told "cancer 98% dead" Lns back to normal size  - 5/26/18 PSA= 0.01 ng/ml  - 5/27/18 3rd cycle of chemo began  - tolerated chemotherapy well - did receive neupogen x 3 days post  - completed 6 cycles 7/29/16  - was doing scans q 6 months and blood work every 3 months  - Dr. Hoskins left [facility] May 2017- notified by letter  - Dr. Cotsello-   - 8/17 scans and labs stable  - Dr. Sethi was assigned most recently as medical oncologist  - July 2018 PSA= 1.68 ng/ml  - 10/1518 CT scans and bone scan  - 10/22/18 saw Dr. Sethi  PSA 1.58 ng/ml  LNs increased slightly on scans  Bone scan negative  Advised him to start Zytiga, started 11/6/18 (with Prednisone)  - concerned about medication and side effects  Feels like he did well on chemotherapy  Concerned about MD turnover  Sent to a chemo class and questions why for this regimen and then got a bill for a co-pay  - came here for a second opinion    HM:  Colonoscopy neg 1 yr ago      Subjective:   Interval History: Mr. Alves is a 53 y.o. " male who returns for follow up and treatment of metastatic prostate cancer. He is tolerating the Zytiga and Prednisone well and without side effects. No complaints today. He is active and has good energy level. Went hunting last week locally. No pain. Urinating well. No incontinence.      Past Medical History:   Past Medical History:   Diagnosis Date    Prostate cancer        Past Surgical HIstory:   Past Surgical History:   Procedure Laterality Date    PORTACATH PLACEMENT         Family History:   Family History   Problem Relation Age of Onset    Emphysema Mother     Heart attack Father     No Known Problems Brother     Cerebral aneurysm Maternal Aunt     No Known Problems Maternal Uncle     No Known Problems Paternal Aunt     No Known Problems Paternal Uncle     No Known Problems Maternal Grandmother     No Known Problems Maternal Grandfather     Colon cancer Paternal Grandmother     No Known Problems Paternal Grandfather     No Known Problems Brother     No Known Problems Son     No Known Problems Son        Social History:  reports that he quit smoking about 2 months ago. His smoking use included cigarettes. He started smoking about 40 years ago. He has a 20.00 pack-year smoking history. He quit smokeless tobacco use about 14 months ago. His smokeless tobacco use included chew. He reports that he drinks alcohol. He reports that he does not use drugs.    Allergies:  Review of patient's allergies indicates:  No Known Allergies    Medications:  Current Outpatient Medications   Medication Sig Dispense Refill    predniSONE (DELTASONE) 5 MG tablet       tamsulosin (FLOMAX) 0.4 mg Cap TK 1 C PO QD FOR BLADDER  11    ZYTIGA 500 mg Tab        No current facility-administered medications for this visit.    Zytiga and Prednisone    Review of Systems   Constitutional: Negative for activity change, appetite change, chills, fatigue, fever and unexpected weight change.   HENT: Negative for dental problem,  "hearing loss, mouth sores, postnasal drip, rhinorrhea, sinus pressure, sore throat, tinnitus, trouble swallowing and voice change.    Eyes: Negative for visual disturbance.   Respiratory: Negative for cough, shortness of breath and wheezing.    Cardiovascular: Negative for chest pain, palpitations and leg swelling.   Gastrointestinal: Negative for abdominal distention, abdominal pain, blood in stool, constipation, diarrhea, nausea and vomiting.   Endocrine: Negative for cold intolerance and heat intolerance.   Genitourinary: Negative for decreased urine volume, difficulty urinating, dysuria, frequency, hematuria and urgency.   Musculoskeletal: Negative for arthralgias, back pain, gait problem, joint swelling, myalgias, neck pain and neck stiffness.   Skin: Negative for color change, pallor, rash and wound.   Neurological: Negative for dizziness, weakness, light-headedness, numbness and headaches.   Hematological: Negative for adenopathy. Does not bruise/bleed easily.   Psychiatric/Behavioral: Negative for decreased concentration, dysphoric mood and sleep disturbance. The patient is not nervous/anxious.        ECOG Performance Status:   0    Objective:      Vitals:   Vitals:    01/21/19 1101   BP: 137/89   BP Location: Left arm   Patient Position: Sitting   Pulse: 95   Resp: 16   Temp: 98.1 °F (36.7 °C)   TempSrc: Oral   SpO2: 96%   Weight: 87.8 kg (193 lb 9 oz)   Height: 5' 9" (1.753 m)     BMI: Body mass index is 28.58 kg/m².    Physical Exam   Constitutional: He is oriented to person, place, and time. He appears well-developed and well-nourished. No distress.   Presents alone.   HENT:   Head: Normocephalic and atraumatic.   Eyes: Conjunctivae and EOM are normal. Pupils are equal, round, and reactive to light. Right eye exhibits no discharge. Left eye exhibits no discharge. No scleral icterus.   Neck: Normal range of motion. Neck supple. No tracheal deviation present. No thyromegaly present.   Cardiovascular: " Normal rate, regular rhythm, normal heart sounds and intact distal pulses. Exam reveals no gallop and no friction rub.   No murmur heard.  Pulmonary/Chest: Effort normal and breath sounds normal. No respiratory distress. He has no wheezes. He has no rales. He exhibits no tenderness.   Abdominal: Soft. Bowel sounds are normal. He exhibits no distension and no mass. There is no tenderness. There is no rebound and no guarding.   No organomegaly   Musculoskeletal: Normal range of motion. He exhibits no edema, tenderness or deformity.   Lymphadenopathy:     He has no cervical adenopathy.   Neurological: He is alert and oriented to person, place, and time. He has normal reflexes. No cranial nerve deficit. He exhibits normal muscle tone. Coordination normal.   Skin: Skin is warm and dry. No rash noted. He is not diaphoretic. No erythema. No pallor.   Psychiatric: He has a normal mood and affect. His behavior is normal. Judgment and thought content normal.   Nursing note and vitals reviewed.    Laboratory Data:  Lab Visit on 01/21/2019   Component Date Value Ref Range Status    WBC 01/21/2019 13.07* 3.90 - 12.70 K/uL Final    RBC 01/21/2019 5.18  4.60 - 6.20 M/uL Final    Hemoglobin 01/21/2019 14.7  14.0 - 18.0 g/dL Final    Hematocrit 01/21/2019 44.0  40.0 - 54.0 % Final    MCV 01/21/2019 85  82 - 98 fL Final    MCH 01/21/2019 28.4  27.0 - 31.0 pg Final    MCHC 01/21/2019 33.4  32.0 - 36.0 g/dL Final    RDW 01/21/2019 15.2* 11.5 - 14.5 % Final    Platelets 01/21/2019 242  150 - 350 K/uL Final    MPV 01/21/2019 9.9  9.2 - 12.9 fL Final    Gran # (ANC) 01/21/2019 8.2* 1.8 - 7.7 K/uL Final    Comment: The ANC is based on a white cell differential from an   automated cell counter. It has not been microscopically   reviewed for the presence of abnormal cells. Clinical   correlation is required.      Immature Grans (Abs) 01/21/2019 0.10* 0.00 - 0.04 K/uL Final    Comment: Mild elevation in immature granulocytes is  non specific and   can be seen in a variety of conditions including stress response,   acute inflammation, trauma and pregnancy. Correlation with other   laboratory and clinical findings is essential.      Sodium 01/21/2019 137  136 - 145 mmol/L Final    Potassium 01/21/2019 3.9  3.5 - 5.1 mmol/L Final    Chloride 01/21/2019 104  95 - 110 mmol/L Final    CO2 01/21/2019 25  23 - 29 mmol/L Final    Glucose 01/21/2019 100  70 - 110 mg/dL Final    BUN, Bld 01/21/2019 16  6 - 20 mg/dL Final    Creatinine 01/21/2019 0.9  0.5 - 1.4 mg/dL Final    Calcium 01/21/2019 9.4  8.7 - 10.5 mg/dL Final    Total Protein 01/21/2019 6.5  6.0 - 8.4 g/dL Final    Albumin 01/21/2019 3.6  3.5 - 5.2 g/dL Final    Total Bilirubin 01/21/2019 0.6  0.1 - 1.0 mg/dL Final    Comment: For infants and newborns, interpretation of results should be based  on gestational age, weight and in agreement with clinical  observations.  Premature Infant recommended reference ranges:  Up to 24 hours.............<8.0 mg/dL  Up to 48 hours............<12.0 mg/dL  3-5 days..................<15.0 mg/dL  6-29 days.................<15.0 mg/dL      Alkaline Phosphatase 01/21/2019 95  55 - 135 U/L Final    AST 01/21/2019 18  10 - 40 U/L Final    ALT 01/21/2019 24  10 - 44 U/L Final    Anion Gap 01/21/2019 8  8 - 16 mmol/L Final    eGFR if African American 01/21/2019 >60.0  >60 mL/min/1.73 m^2 Final    eGFR if non African American 01/21/2019 >60.0  >60 mL/min/1.73 m^2 Final    Comment: Calculation used to obtain the estimated glomerular filtration  rate (eGFR) is the CKD-EPI equation.       PSA DIAGNOSTIC 01/21/2019 0.12  0.00 - 4.00 ng/mL Final    Comment: PSA Expected levels:  Hormonal Therapy: <0.05 ng/ml  Prostatectomy: <0.01 ng/ml  Radiation Therapy: <1.00 ng/ml           Imaging: discussed as above    Assessment:     1. 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 Leukocytosis, unspecified type        Plan:       Patient doing  well.   Labs reviewed. PSA 2.9 > 0.17 > 0.12 today. WBC slightly elevated- likely due to prednisone. No infectious s/s.  Continue Zytiga and Prednisone as prescribed.   He receives Lupron every 3 months per Urology at . Next due today at .   RTC in 4 weeks with a CBC, CMP, PSA, testosterone and to see Dr. Escobar.     Patient is in agreement with the proposed treatment plan. All questions were answered to the patient's satisfaction. Pt knows to call clinic for any new or worsening symptoms and if anything is needed before the next clinic visit.      Denisa Couch, JOSELYNP-C  Hematology & Oncology  [facility] [place] [address]  ph. [phone]  Fax. [phone]     I spent 30 minutes (face to face) with the patient, more than 50% was in counseling and coordination of care as detailed above.       Distress Screening Results: Psychosocial Distress screening score of Distress Score: 0 noted and reviewed. No intervention indicated.

## 2019-01-21 ENCOUNTER — LAB VISIT (OUTPATIENT)
Dept: LAB | Facility: HOSPITAL | Age: 54
End: 2019-01-21
Attending: INTERNAL MEDICINE
Payer: COMMERCIAL

## 2019-01-21 ENCOUNTER — OFFICE VISIT (OUTPATIENT)
Dept: HEMATOLOGY/ONCOLOGY | Facility: CLINIC | Age: 54
End: 2019-01-21
Payer: COMMERCIAL

## 2019-01-21 VITALS
TEMPERATURE: 98 F | DIASTOLIC BLOOD PRESSURE: 89 MMHG | RESPIRATION RATE: 16 BRPM | SYSTOLIC BLOOD PRESSURE: 137 MMHG | HEIGHT: 69 IN | WEIGHT: 193.56 LBS | HEART RATE: 95 BPM | BODY MASS INDEX: 28.67 KG/M2 | OXYGEN SATURATION: 96 %

## 2019-01-21 DIAGNOSIS — C61 PROSTATE CANCER METASTATIC TO INTRAPELVIC LYMPH NODE: Primary | ICD-10-CM

## 2019-01-21 DIAGNOSIS — C61 PROSTATE CANCER METASTATIC TO INTRAPELVIC LYMPH NODE: ICD-10-CM

## 2019-01-21 DIAGNOSIS — C77.5 PROSTATE CANCER METASTATIC TO INTRAPELVIC LYMPH NODE: ICD-10-CM

## 2019-01-21 DIAGNOSIS — C77.5 PROSTATE CANCER METASTATIC TO INTRAPELVIC LYMPH NODE: Primary | ICD-10-CM

## 2019-01-21 DIAGNOSIS — D72.829 LEUKOCYTOSIS, UNSPECIFIED TYPE: ICD-10-CM

## 2019-01-21 LAB
ALBUMIN SERPL BCP-MCNC: 3.6 G/DL
ALP SERPL-CCNC: 95 U/L
ALT SERPL W/O P-5'-P-CCNC: 24 U/L
ANION GAP SERPL CALC-SCNC: 8 MMOL/L
AST SERPL-CCNC: 18 U/L
BILIRUB SERPL-MCNC: 0.6 MG/DL
BUN SERPL-MCNC: 16 MG/DL
CALCIUM SERPL-MCNC: 9.4 MG/DL
CHLORIDE SERPL-SCNC: 104 MMOL/L
CO2 SERPL-SCNC: 25 MMOL/L
COMPLEXED PSA SERPL-MCNC: 0.12 NG/ML
CREAT SERPL-MCNC: 0.9 MG/DL
ERYTHROCYTE [DISTWIDTH] IN BLOOD BY AUTOMATED COUNT: 15.2 %
EST. GFR  (AFRICAN AMERICAN): >60 ML/MIN/1.73 M^2
EST. GFR  (NON AFRICAN AMERICAN): >60 ML/MIN/1.73 M^2
GLUCOSE SERPL-MCNC: 100 MG/DL
HCT VFR BLD AUTO: 44 %
HGB BLD-MCNC: 14.7 G/DL
IMM GRANULOCYTES # BLD AUTO: 0.1 K/UL
MCH RBC QN AUTO: 28.4 PG
MCHC RBC AUTO-ENTMCNC: 33.4 G/DL
MCV RBC AUTO: 85 FL
NEUTROPHILS # BLD AUTO: 8.2 K/UL
PLATELET # BLD AUTO: 242 K/UL
PMV BLD AUTO: 9.9 FL
POTASSIUM SERPL-SCNC: 3.9 MMOL/L
PROT SERPL-MCNC: 6.5 G/DL
RBC # BLD AUTO: 5.18 M/UL
SODIUM SERPL-SCNC: 137 MMOL/L
WBC # BLD AUTO: 13.07 K/UL

## 2019-01-21 PROCEDURE — 80053 COMPREHEN METABOLIC PANEL: CPT

## 2019-01-21 PROCEDURE — 99214 OFFICE O/P EST MOD 30 MIN: CPT | Mod: S$GLB,,, | Performed by: NURSE PRACTITIONER

## 2019-01-21 PROCEDURE — 85027 COMPLETE CBC AUTOMATED: CPT

## 2019-01-21 PROCEDURE — 99999 PR PBB SHADOW E&M-EST. PATIENT-LVL III: ICD-10-PCS | Mod: PBBFAC,,, | Performed by: NURSE PRACTITIONER

## 2019-01-21 PROCEDURE — 3008F BODY MASS INDEX DOCD: CPT | Mod: CPTII,S$GLB,, | Performed by: NURSE PRACTITIONER

## 2019-01-21 PROCEDURE — 99214 PR OFFICE/OUTPT VISIT, EST, LEVL IV, 30-39 MIN: ICD-10-PCS | Mod: S$GLB,,, | Performed by: NURSE PRACTITIONER

## 2019-01-21 PROCEDURE — 36415 COLL VENOUS BLD VENIPUNCTURE: CPT

## 2019-01-21 PROCEDURE — 3008F PR BODY MASS INDEX (BMI) DOCUMENTED: ICD-10-PCS | Mod: CPTII,S$GLB,, | Performed by: NURSE PRACTITIONER

## 2019-01-21 PROCEDURE — 99999 PR PBB SHADOW E&M-EST. PATIENT-LVL III: CPT | Mod: PBBFAC,,, | Performed by: NURSE PRACTITIONER

## 2019-01-21 PROCEDURE — 84153 ASSAY OF PSA TOTAL: CPT

## 2019-02-18 ENCOUNTER — OFFICE VISIT (OUTPATIENT)
Dept: HEMATOLOGY/ONCOLOGY | Facility: CLINIC | Age: 54
End: 2019-02-18
Payer: COMMERCIAL

## 2019-02-18 ENCOUNTER — LAB VISIT (OUTPATIENT)
Dept: LAB | Facility: HOSPITAL | Age: 54
End: 2019-02-18
Payer: COMMERCIAL

## 2019-02-18 VITALS
HEIGHT: 69 IN | WEIGHT: 196 LBS | BODY MASS INDEX: 29.03 KG/M2 | TEMPERATURE: 98 F | DIASTOLIC BLOOD PRESSURE: 96 MMHG | HEART RATE: 75 BPM | RESPIRATION RATE: 16 BRPM | SYSTOLIC BLOOD PRESSURE: 134 MMHG | OXYGEN SATURATION: 95 %

## 2019-02-18 DIAGNOSIS — C61 PROSTATE CANCER METASTATIC TO INTRAPELVIC LYMPH NODE: Primary | ICD-10-CM

## 2019-02-18 DIAGNOSIS — C77.5 PROSTATE CANCER METASTATIC TO INTRAPELVIC LYMPH NODE: ICD-10-CM

## 2019-02-18 DIAGNOSIS — C77.5 PROSTATE CANCER METASTATIC TO INTRAPELVIC LYMPH NODE: Primary | ICD-10-CM

## 2019-02-18 DIAGNOSIS — C61 PROSTATE CANCER METASTATIC TO INTRAPELVIC LYMPH NODE: ICD-10-CM

## 2019-02-18 LAB
ALBUMIN SERPL BCP-MCNC: 3.8 G/DL
ALP SERPL-CCNC: 93 U/L
ALT SERPL W/O P-5'-P-CCNC: 24 U/L
ANION GAP SERPL CALC-SCNC: 9 MMOL/L
AST SERPL-CCNC: 22 U/L
BILIRUB SERPL-MCNC: 0.8 MG/DL
BUN SERPL-MCNC: 18 MG/DL
CALCIUM SERPL-MCNC: 9.6 MG/DL
CHLORIDE SERPL-SCNC: 105 MMOL/L
CO2 SERPL-SCNC: 25 MMOL/L
COMPLEXED PSA SERPL-MCNC: 0.08 NG/ML
CREAT SERPL-MCNC: 1 MG/DL
ERYTHROCYTE [DISTWIDTH] IN BLOOD BY AUTOMATED COUNT: 14.7 %
EST. GFR  (AFRICAN AMERICAN): >60 ML/MIN/1.73 M^2
EST. GFR  (NON AFRICAN AMERICAN): >60 ML/MIN/1.73 M^2
GLUCOSE SERPL-MCNC: 87 MG/DL
HCT VFR BLD AUTO: 42.1 %
HGB BLD-MCNC: 14.1 G/DL
IMM GRANULOCYTES # BLD AUTO: 0.08 K/UL
MCH RBC QN AUTO: 28.8 PG
MCHC RBC AUTO-ENTMCNC: 33.5 G/DL
MCV RBC AUTO: 86 FL
NEUTROPHILS # BLD AUTO: 8.3 K/UL
PLATELET # BLD AUTO: 255 K/UL
PMV BLD AUTO: 10.4 FL
POTASSIUM SERPL-SCNC: 3.7 MMOL/L
PROT SERPL-MCNC: 6.7 G/DL
RBC # BLD AUTO: 4.89 M/UL
SODIUM SERPL-SCNC: 139 MMOL/L
TESTOST SERPL-MCNC: <4 NG/DL
WBC # BLD AUTO: 11.65 K/UL

## 2019-02-18 PROCEDURE — 3008F BODY MASS INDEX DOCD: CPT | Mod: CPTII,S$GLB,, | Performed by: INTERNAL MEDICINE

## 2019-02-18 PROCEDURE — 36415 COLL VENOUS BLD VENIPUNCTURE: CPT

## 2019-02-18 PROCEDURE — 99999 PR PBB SHADOW E&M-EST. PATIENT-LVL III: CPT | Mod: PBBFAC,,, | Performed by: INTERNAL MEDICINE

## 2019-02-18 PROCEDURE — 99213 OFFICE O/P EST LOW 20 MIN: CPT | Mod: S$GLB,,, | Performed by: INTERNAL MEDICINE

## 2019-02-18 PROCEDURE — 84403 ASSAY OF TOTAL TESTOSTERONE: CPT

## 2019-02-18 PROCEDURE — 99999 PR PBB SHADOW E&M-EST. PATIENT-LVL III: ICD-10-PCS | Mod: PBBFAC,,, | Performed by: INTERNAL MEDICINE

## 2019-02-18 PROCEDURE — 85027 COMPLETE CBC AUTOMATED: CPT

## 2019-02-18 PROCEDURE — 80053 COMPREHEN METABOLIC PANEL: CPT

## 2019-02-18 PROCEDURE — 84153 ASSAY OF PSA TOTAL: CPT

## 2019-02-18 PROCEDURE — 99213 PR OFFICE/OUTPT VISIT, EST, LEVL III, 20-29 MIN: ICD-10-PCS | Mod: S$GLB,,, | Performed by: INTERNAL MEDICINE

## 2019-02-18 PROCEDURE — 3008F PR BODY MASS INDEX (BMI) DOCUMENTED: ICD-10-PCS | Mod: CPTII,S$GLB,, | Performed by: INTERNAL MEDICINE

## 2019-02-18 NOTE — PROGRESS NOTES
"Subjective:       Patient ID: Martin Alves is a 53 y.o. male.    Chief Complaint: Prostate cancer metastatic to intrapelvic lymph node    HPI       Subjective:   Interval History: Mr. Alves is a 53 y.o. male who returns for follow up and treatment of metastatic prostate cancer. He is tolerating the Zytiga and Prednisone well and without side effects. He is active and has good energy level.    Lupron on 1/29/19- next due in 3 months (5/1/19)  In the interval he was treated by Urgent Care for an URI, he completed 10 days antibiotics a couple of weeks ago.  Reports no fevers or chills  Reports achiness  His grandson had the flu but he tested negative    Returns for PSA follow-up  PSA= 0.08 ng/ml     Oncologic History See below    Oncologic Treatment  Zytiga and Prednisone    Pathology  adenocarcinoma      Oncology History:  - 2015 having difficulty urinating- urgency and frequency  Saw Dr. Monson  PSA= 4.6 ng/ml and placed on Flomax  Advised to recheck in 1 month and it was 3.9 ng/ml  - early 2016 PSA- 2/16 = 7.8 ng/ml  - 2/16 prostate biopsy- told very aggressive Erika=9  Placed on lupron q 3 months at that time (last 10/18, due again 1/19)  MRI prostate  CT scans- seen in LNs and base of bladder  Bone scan- negative  - told inoperable  - referred to Dr. Kirkland, medical oncology  - port placed 3/21/16  Initiated Taxotere q 3 weeks x 6 (started on 3/25/16)  - PSA dropped significantly   - plan was to rescan and check PSA after 3 cycles- delays with insurance so done instead after 2 chemotherapy treatments in 5/19/2016  Told "cancer 98% dead" Lns back to normal size  - 5/26/18 PSA= 0.01 ng/ml  - 5/27/18 3rd cycle of chemo began  - tolerated chemotherapy well - did receive neupogen x 3 days post  - completed 6 cycles 7/29/16  - was doing scans q 6 months and blood work every 3 months  - Dr. Hoskins left [facility] May 2017- notified by letter  - Dr. Costello-   - 8/17 scans and labs stable  - Dr. Sethi was " assigned most recently as medical oncologist  - July 2018 PSA= 1.68 ng/ml  - 10/1518 CT scans and bone scan  - 10/22/18 saw Dr. Sethi  PSA 1.58 ng/ml  LNs increased slightly on scans  Bone scan negative  Advised him to start Zytiga, started 11/6/18 (with Prednisone)  - concerned about medication and side effects  Feels like he did well on chemotherapy  Concerned about MD turnover  Sent to a chemo class and questions why for this regimen and then got a bill for a co-pay  - came here for a second opinion     HM:  Colonoscopy neg 1 yr ago    Review of Systems   Constitutional: Negative for activity change, appetite change, chills, fatigue, fever and unexpected weight change.   HENT: Negative for dental problem, hearing loss, mouth sores, postnasal drip, rhinorrhea, sinus pressure, sore throat, tinnitus, trouble swallowing and voice change.    Eyes: Negative for visual disturbance.   Respiratory: Negative for cough, shortness of breath and wheezing.    Cardiovascular: Negative for chest pain, palpitations and leg swelling.   Gastrointestinal: Negative for abdominal distention, abdominal pain, blood in stool, constipation, diarrhea, nausea and vomiting.   Endocrine: Negative for cold intolerance and heat intolerance.   Genitourinary: Negative for decreased urine volume, difficulty urinating, dysuria, frequency, hematuria and urgency.   Musculoskeletal: Negative for arthralgias, back pain, gait problem, joint swelling, myalgias, neck pain and neck stiffness.   Skin: Negative for color change, pallor, rash and wound.   Neurological: Negative for dizziness, weakness, light-headedness, numbness and headaches.   Hematological: Negative for adenopathy. Does not bruise/bleed easily.   Psychiatric/Behavioral: Negative for decreased concentration, dysphoric mood and sleep disturbance. The patient is not nervous/anxious.        Objective:      Physical Exam   Constitutional: He is oriented to person, place, and time. He appears  well-developed and well-nourished. No distress.   Presents alone.   HENT:   Head: Normocephalic and atraumatic.   Eyes: Conjunctivae and EOM are normal. Pupils are equal, round, and reactive to light. Right eye exhibits no discharge. Left eye exhibits no discharge. No scleral icterus.   Neck: Normal range of motion. Neck supple. No tracheal deviation present. No thyromegaly present.   Cardiovascular: Normal rate, regular rhythm, normal heart sounds and intact distal pulses. Exam reveals no gallop and no friction rub.   No murmur heard.  Pulmonary/Chest: Effort normal and breath sounds normal. No respiratory distress. He has no wheezes. He has no rales. He exhibits no tenderness.   Abdominal: Soft. Bowel sounds are normal. He exhibits no distension and no mass. There is no tenderness. There is no rebound and no guarding.   No organomegaly   Musculoskeletal: Normal range of motion. He exhibits no edema, tenderness or deformity.   Lymphadenopathy:     He has no cervical adenopathy.   Neurological: He is alert and oriented to person, place, and time. He has normal reflexes. No cranial nerve deficit. He exhibits normal muscle tone. Coordination normal.   Skin: Skin is warm and dry. No rash noted. He is not diaphoretic. No erythema. No pallor.   Psychiatric: He has a normal mood and affect. His behavior is normal. Judgment and thought content normal.   Nursing note and vitals reviewed.   Labs- reviewed   Assessment:       1. 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 Continue Zytiga and Prednisone with response  RTC 8 weeks    15 minutes total      Distress Screening Results: Psychosocial Distress screening score of Distress Score: 0 noted and reviewed. No intervention indicated.

## 2019-04-03 DIAGNOSIS — C61 PROSTATE CANCER: Primary | ICD-10-CM

## 2019-04-03 RX ORDER — ABIRATERONE ACETATE 500 MG/1
1000 TABLET, FILM COATED ORAL DAILY
Qty: 60 TABLET | Refills: 3 | Status: SHIPPED | OUTPATIENT
Start: 2019-04-03 | End: 2019-07-23 | Stop reason: SDUPTHER

## 2019-04-03 RX ORDER — PREDNISONE 5 MG/1
5 TABLET ORAL 2 TIMES DAILY
Qty: 60 TABLET | Refills: 3 | Status: SHIPPED | OUTPATIENT
Start: 2019-04-03 | End: 2019-08-13 | Stop reason: SDUPTHER

## 2019-04-03 NOTE — TELEPHONE ENCOUNTER
----- Message from Raine Huerta sent at 4/3/2019 10:04 AM CDT -----  Contact: [facility]   Requesting a refill for the listed Rx :  -  predniSONE (DELTASONE) 5 MG tablet   -  ZYTIGA 500 mg Tab   Out of the refills for listed Rx;    Pharmacy:   [facility] - [phone] (Phone) or [phone] (Fax)    Thank you .

## 2019-04-12 ENCOUNTER — LAB VISIT (OUTPATIENT)
Dept: LAB | Facility: HOSPITAL | Age: 54
End: 2019-04-12
Attending: INTERNAL MEDICINE
Payer: COMMERCIAL

## 2019-04-12 ENCOUNTER — OFFICE VISIT (OUTPATIENT)
Dept: HEMATOLOGY/ONCOLOGY | Facility: CLINIC | Age: 54
End: 2019-04-12
Payer: COMMERCIAL

## 2019-04-12 VITALS
BODY MASS INDEX: 29.43 KG/M2 | HEART RATE: 72 BPM | RESPIRATION RATE: 20 BRPM | SYSTOLIC BLOOD PRESSURE: 138 MMHG | DIASTOLIC BLOOD PRESSURE: 90 MMHG | WEIGHT: 199.31 LBS | TEMPERATURE: 98 F

## 2019-04-12 DIAGNOSIS — C77.5 PROSTATE CANCER METASTATIC TO INTRAPELVIC LYMPH NODE: ICD-10-CM

## 2019-04-12 DIAGNOSIS — C61 PROSTATE CANCER METASTATIC TO INTRAPELVIC LYMPH NODE: ICD-10-CM

## 2019-04-12 DIAGNOSIS — C61 PROSTATE CANCER METASTATIC TO INTRAPELVIC LYMPH NODE: Primary | ICD-10-CM

## 2019-04-12 DIAGNOSIS — C77.5 PROSTATE CANCER METASTATIC TO INTRAPELVIC LYMPH NODE: Primary | ICD-10-CM

## 2019-04-12 LAB
ALBUMIN SERPL BCP-MCNC: 3.7 G/DL (ref 3.5–5.2)
ALP SERPL-CCNC: 94 U/L (ref 55–135)
ALT SERPL W/O P-5'-P-CCNC: 22 U/L (ref 10–44)
ANION GAP SERPL CALC-SCNC: 8 MMOL/L (ref 8–16)
AST SERPL-CCNC: 18 U/L (ref 10–40)
BASOPHILS # BLD AUTO: 0.07 K/UL (ref 0–0.2)
BASOPHILS NFR BLD: 0.5 % (ref 0–1.9)
BILIRUB SERPL-MCNC: 0.4 MG/DL (ref 0.1–1)
BUN SERPL-MCNC: 13 MG/DL (ref 6–20)
CALCIUM SERPL-MCNC: 9.7 MG/DL (ref 8.7–10.5)
CHLORIDE SERPL-SCNC: 105 MMOL/L (ref 95–110)
CO2 SERPL-SCNC: 26 MMOL/L (ref 23–29)
CREAT SERPL-MCNC: 0.8 MG/DL (ref 0.5–1.4)
DIFFERENTIAL METHOD: ABNORMAL
EOSINOPHIL # BLD AUTO: 0.2 K/UL (ref 0–0.5)
EOSINOPHIL NFR BLD: 1.2 % (ref 0–8)
ERYTHROCYTE [DISTWIDTH] IN BLOOD BY AUTOMATED COUNT: 14 % (ref 11.5–14.5)
EST. GFR  (AFRICAN AMERICAN): >60 ML/MIN/1.73 M^2
EST. GFR  (NON AFRICAN AMERICAN): >60 ML/MIN/1.73 M^2
GLUCOSE SERPL-MCNC: 102 MG/DL (ref 70–110)
HCT VFR BLD AUTO: 42.2 % (ref 40–54)
HGB BLD-MCNC: 13.8 G/DL (ref 14–18)
IMM GRANULOCYTES # BLD AUTO: 0.13 K/UL (ref 0–0.04)
IMM GRANULOCYTES NFR BLD AUTO: 0.9 % (ref 0–0.5)
LYMPHOCYTES # BLD AUTO: 2.3 K/UL (ref 1–4.8)
LYMPHOCYTES NFR BLD: 15.4 % (ref 18–48)
MCH RBC QN AUTO: 28.5 PG (ref 27–31)
MCHC RBC AUTO-ENTMCNC: 32.7 G/DL (ref 32–36)
MCV RBC AUTO: 87 FL (ref 82–98)
MONOCYTES # BLD AUTO: 0.9 K/UL (ref 0.3–1)
MONOCYTES NFR BLD: 6.2 % (ref 4–15)
NEUTROPHILS # BLD AUTO: 11.3 K/UL (ref 1.8–7.7)
NEUTROPHILS NFR BLD: 75.8 % (ref 38–73)
NRBC BLD-RTO: 0 /100 WBC
PLATELET # BLD AUTO: 281 K/UL (ref 150–350)
PMV BLD AUTO: 10.2 FL (ref 9.2–12.9)
POTASSIUM SERPL-SCNC: 4.2 MMOL/L (ref 3.5–5.1)
PROT SERPL-MCNC: 6.6 G/DL (ref 6–8.4)
RBC # BLD AUTO: 4.84 M/UL (ref 4.6–6.2)
SODIUM SERPL-SCNC: 139 MMOL/L (ref 136–145)
WBC # BLD AUTO: 14.94 K/UL (ref 3.9–12.7)

## 2019-04-12 PROCEDURE — 99214 PR OFFICE/OUTPT VISIT, EST, LEVL IV, 30-39 MIN: ICD-10-PCS | Mod: S$GLB,,, | Performed by: INTERNAL MEDICINE

## 2019-04-12 PROCEDURE — 99214 OFFICE O/P EST MOD 30 MIN: CPT | Mod: S$GLB,,, | Performed by: INTERNAL MEDICINE

## 2019-04-12 PROCEDURE — 3008F PR BODY MASS INDEX (BMI) DOCUMENTED: ICD-10-PCS | Mod: CPTII,S$GLB,, | Performed by: INTERNAL MEDICINE

## 2019-04-12 PROCEDURE — 80053 COMPREHEN METABOLIC PANEL: CPT

## 2019-04-12 PROCEDURE — 85025 COMPLETE CBC W/AUTO DIFF WBC: CPT

## 2019-04-12 PROCEDURE — 99999 PR PBB SHADOW E&M-EST. PATIENT-LVL III: ICD-10-PCS | Mod: PBBFAC,,, | Performed by: INTERNAL MEDICINE

## 2019-04-12 PROCEDURE — 99999 PR PBB SHADOW E&M-EST. PATIENT-LVL III: CPT | Mod: PBBFAC,,, | Performed by: INTERNAL MEDICINE

## 2019-04-12 PROCEDURE — 36415 COLL VENOUS BLD VENIPUNCTURE: CPT

## 2019-04-12 PROCEDURE — 3008F BODY MASS INDEX DOCD: CPT | Mod: CPTII,S$GLB,, | Performed by: INTERNAL MEDICINE

## 2019-04-12 NOTE — PROGRESS NOTES
"Subjective:       Patient ID: Martin Alves is a 53 y.o. male.    Chief Complaint: No chief complaint on file.    HPI     Mr. Alves is a 53 y.o. male who returns for follow up and treatment of metastatic prostate cancer. He is tolerating the Zytiga and Prednisone well and without side effects. He is active and has good energy level.  In the interval developed a cold- now resolved  Weight gain and good appetite  No pain issues    Lupron on 1/29/19- next due in 3 months (5/1/19), receives with local urologist     Returns for routine follow-up and will need PSA at next visit  PSA= 0.08 ng/ml      Oncologic History See below    Oncologic Treatment  Zytiga and Prednisone    Pathology  adenocarcinoma      Oncology History:  - 2015 having difficulty urinating- urgency and frequency  Saw Dr. Monson  PSA= 4.6 ng/ml and placed on Flomax  Advised to recheck in 1 month and it was 3.9 ng/ml  - early 2016 PSA- 2/16 = 7.8 ng/ml  - 2/16 prostate biopsy- told very aggressive Erika=9  Placed on lupron q 3 months at that time (last 10/18, due again 1/19)  MRI prostate  CT scans- seen in LNs and base of bladder  Bone scan- negative  - told inoperable  - referred to Dr. Kirkland, medical oncology  - port placed 3/21/16  Initiated Taxotere q 3 weeks x 6 (started on 3/25/16)  - PSA dropped significantly   - plan was to rescan and check PSA after 3 cycles- delays with insurance so done instead after 2 chemotherapy treatments in 5/19/2016  Told "cancer 98% dead" Lns back to normal size  - 5/26/18 PSA= 0.01 ng/ml  - 5/27/18 3rd cycle of chemo began  - tolerated chemotherapy well - did receive neupogen x 3 days post  - completed 6 cycles 7/29/16  - was doing scans q 6 months and blood work every 3 months  - Dr. Hoskins left [facility] May 2017- notified by letter  - Dr. Costello-   - 8/17 scans and labs stable  - Dr. Sethi was assigned most recently as medical oncologist  - July 2018 PSA= 1.68 ng/ml  - 10/1518 CT scans and bone " scan  - 10/22/18 saw Dr. Sethi  PSA 1.58 ng/ml  LNs increased slightly on scans  Bone scan negative  Advised him to start Zytiga, started 11/6/18 (with Prednisone)  - concerned about medication and side effects  Feels like he did well on chemotherapy  Concerned about MD turnover  Sent to a chemo class and questions why for this regimen and then got a bill for a co-pay  - came here for a second opinion     HM:  Colonoscopy neg 1 yr ago    Review of Systems   Constitutional: Negative for activity change, appetite change, chills, fatigue, fever and unexpected weight change.   HENT: Negative for dental problem, hearing loss, mouth sores, postnasal drip, rhinorrhea, sinus pressure, sore throat, tinnitus, trouble swallowing and voice change.    Eyes: Negative for visual disturbance.   Respiratory: Negative for cough, shortness of breath and wheezing.    Cardiovascular: Negative for chest pain, palpitations and leg swelling.   Gastrointestinal: Negative for abdominal distention, abdominal pain, blood in stool, constipation, diarrhea, nausea and vomiting.   Endocrine: Negative for cold intolerance and heat intolerance.   Genitourinary: Negative for decreased urine volume, difficulty urinating, dysuria, frequency, hematuria and urgency.   Musculoskeletal: Negative for arthralgias, back pain, gait problem, joint swelling, myalgias, neck pain and neck stiffness.   Skin: Negative for color change, pallor, rash and wound.   Neurological: Negative for dizziness, weakness, light-headedness, numbness and headaches.   Hematological: Negative for adenopathy. Does not bruise/bleed easily.   Psychiatric/Behavioral: Negative for decreased concentration, dysphoric mood and sleep disturbance. The patient is not nervous/anxious.        Objective:      Physical Exam   Constitutional: He is oriented to person, place, and time. He appears well-developed and well-nourished. No distress.   Presents alone.   HENT:   Head: Normocephalic and  atraumatic.   Eyes: Pupils are equal, round, and reactive to light. Conjunctivae and EOM are normal. Right eye exhibits no discharge. Left eye exhibits no discharge. No scleral icterus.   Neck: Normal range of motion. Neck supple. No tracheal deviation present. No thyromegaly present.   Cardiovascular: Normal rate, regular rhythm, normal heart sounds and intact distal pulses. Exam reveals no gallop and no friction rub.   No murmur heard.  Pulmonary/Chest: Effort normal and breath sounds normal. No respiratory distress. He has no wheezes. He has no rales. He exhibits no tenderness.   Abdominal: Soft. Bowel sounds are normal. He exhibits no distension and no mass. There is no tenderness. There is no rebound and no guarding.   No organomegaly   Musculoskeletal: Normal range of motion. He exhibits no edema, tenderness or deformity.   Lymphadenopathy:     He has no cervical adenopathy.   Neurological: He is alert and oriented to person, place, and time. He has normal reflexes. No cranial nerve deficit. He exhibits normal muscle tone. Coordination normal.   Skin: Skin is warm and dry. No rash noted. He is not diaphoretic. No erythema. No pallor.   Psychiatric: He has a normal mood and affect. His behavior is normal. Judgment and thought content normal.   Nursing note and vitals reviewed.   Labs- reviewed   Assessment:       1. 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 Continue current therapy  Tolerating well and labs appropriate  Knows to call for any issues  RTC 1 month with PSA    25 minutes total      Distress Screening Results: Psychosocial Distress screening score of Distress Score: 0 noted and reviewed. No intervention indicated.

## 2019-05-08 NOTE — PROGRESS NOTES
Subjective:       Patient ID: Martin Alves is a 53 y.o. male.    Chief Complaint: Prostate cancer metastatic to intrapelvic lymph node    HPI     Mr. Alves is a 53 y.o. male who returns for follow up and treatment of metastatic prostate cancer. He is tolerating the Zytiga and Prednisone well and without side effects.     Notes occasional diarrhea this past month- feels may be related to high fat meals as he eats a lot of fried foods.   Increased stress at work as well.   Last Colonoscopy 2017 or 2018 - In [place] - Dr. Menon  No abdominal pain or blood/mucus in stools.   No fever/chills.   Bruising on arms lately.   No other complaints.   He is active and has good energy level.  No pain issues    Lupron on 5/1/19- next due in 3 months,  receives with local urologist.      PSA review:        BMD 12/18/18:   NORMAL BMD OF THE HIP AND LUMBAR SPINE.  THE TBS T-SCORE L1-L4 IS -0.4.  RECOMMENDATIONS of [facility]:    1.  Calcium 1000 mg daily and vitamin D 600 units daily, adequate exercise.    2.  Keep prednisone dose to a minimum.    3.  Repeat BMD in 1-2 years depending on prednisone dose.          Oncologic History See below    Oncologic Treatment  Zytiga and Prednisone    Pathology  adenocarcinoma      Oncology History:  - 2015 having difficulty urinating- urgency and frequency  Saw Dr. Monson  PSA= 4.6 ng/ml and placed on Flomax  Advised to recheck in 1 month and it was 3.9 ng/ml  - early 2016 PSA- 2/16 = 7.8 ng/ml  - 2/16 prostate biopsy- told very aggressive Dra[place]=9  Placed on lupron q 3 months at that time (last 10/18, due again 1/19)  MRI prostate  CT scans- seen in LNs and base of bladder  Bone scan- negative  - told inoperable  - referred to Dr. Kirkland, medical oncology  - port placed 3/21/16  Initiated Taxotere q 3 weeks x 6 (started on 3/25/16)  - PSA dropped significantly   - plan was to rescan and check PSA after 3 cycles- delays with  "insurance so done instead after 2 chemotherapy treatments in 5/19/2016  Told "cancer 98% dead" Lns back to normal size  - 5/26/18 PSA= 0.01 ng/ml  - 5/27/18 3rd cycle of chemo began  - tolerated chemotherapy well - did receive neupogen x 3 days post  - completed 6 cycles 7/29/16  - was doing scans q 6 months and blood work every 3 months  - Dr. Hoskins left [facility] May 2017- notified by letter  - Dr. Costello-   - 8/17 scans and labs stable  - Dr. Sethi was assigned most recently as medical oncologist  - July 2018 PSA= 1.68 ng/ml  - 10/1518 CT scans and bone scan  - 10/22/18 saw Dr. Sethi  PSA 1.58 ng/ml  LNs increased slightly on scans  Bone scan negative  Advised him to start Zytiga, started 11/6/18 (with Prednisone)  - concerned about medication and side effects  Feels like he did well on chemotherapy  Concerned about MD turnover  Sent to a chemo class and questions why for this regimen and then got a bill for a co-pay  - came here for a second opinion     HM:  Colonoscopy neg 1 yr ago    Review of Systems   Constitutional: Negative for activity change, appetite change, chills, fatigue, fever and unexpected weight change.   HENT: Negative for dental problem, hearing loss, mouth sores, postnasal drip, rhinorrhea, sinus pressure, sore throat, tinnitus, trouble swallowing and voice change.    Eyes: Negative for visual disturbance.   Respiratory: Negative for cough, shortness of breath and wheezing.    Cardiovascular: Negative for chest pain, palpitations and leg swelling.   Gastrointestinal: Positive for diarrhea (on occasion). Negative for abdominal distention, abdominal pain, blood in stool, constipation, nausea and vomiting.   Endocrine: Negative for cold intolerance and heat intolerance.   Genitourinary: Negative for decreased urine volume, difficulty urinating, dysuria, frequency, hematuria and urgency.   Musculoskeletal: Negative for arthralgias, back pain, gait problem, joint swelling, myalgias, neck pain and " neck stiffness.   Skin: Negative for color change, pallor, rash and wound.   Neurological: Positive for headaches (rare; stress from work). Negative for dizziness, weakness, light-headedness and numbness.   Hematological: Negative for adenopathy. Does not bruise/bleed easily.   Psychiatric/Behavioral: Negative for decreased concentration, dysphoric mood and sleep disturbance. The patient is not nervous/anxious.        Objective:      Physical Exam   Constitutional: He is oriented to person, place, and time. He appears well-developed and well-nourished. No distress.   Presents alone.   HENT:   Head: Normocephalic and atraumatic.   Eyes: Pupils are equal, round, and reactive to light. Conjunctivae and EOM are normal. Right eye exhibits no discharge. Left eye exhibits no discharge. No scleral icterus.   Neck: Normal range of motion. Neck supple. No tracheal deviation present. No thyromegaly present.   Cardiovascular: Normal rate, regular rhythm, normal heart sounds and intact distal pulses. Exam reveals no gallop and no friction rub.   No murmur heard.  Pulmonary/Chest: Effort normal and breath sounds normal. No respiratory distress. He has no wheezes. He has no rales. He exhibits no tenderness.   Abdominal: Soft. Bowel sounds are normal. He exhibits no distension and no mass. There is no tenderness. There is no rebound and no guarding.   No organomegaly   Musculoskeletal: Normal range of motion. He exhibits no edema, tenderness or deformity.   Lymphadenopathy:     He has no cervical adenopathy.   Neurological: He is alert and oriented to person, place, and time. He has normal reflexes. No cranial nerve deficit. He exhibits normal muscle tone. Coordination normal.   Skin: Skin is warm and dry. No rash noted. He is not diaphoretic. No erythema. No pallor.   Psychiatric: He has a normal mood and affect. His behavior is normal. Judgment and thought content normal.   Nursing note and vitals reviewed.      WBC   Date Value  Ref Range Status   05/10/2019 14.27 (H) 3.90 - 12.70 K/uL Final     Hemoglobin   Date Value Ref Range Status   05/10/2019 14.0 14.0 - 18.0 g/dL Final     Hematocrit   Date Value Ref Range Status   05/10/2019 43.0 40.0 - 54.0 % Final     Platelets   Date Value Ref Range Status   05/10/2019 250 150 - 350 K/uL Final     Gran # (ANC)   Date Value Ref Range Status   05/10/2019 10.7 (H) 1.8 - 7.7 K/uL Final     Gran%   Date Value Ref Range Status   05/10/2019 75.2 (H) 38.0 - 73.0 % Final       Chemistry        Component Value Date/Time     05/10/2019 1001    K 3.6 05/10/2019 1001     05/10/2019 1001    CO2 23 05/10/2019 1001    BUN 14 05/10/2019 1001    CREATININE 0.9 05/10/2019 1001     05/10/2019 1001        Component Value Date/Time    CALCIUM 9.5 05/10/2019 1001    ALKPHOS 93 05/10/2019 1001    AST 15 05/10/2019 1001    ALT 19 05/10/2019 1001    BILITOT 0.3 05/10/2019 1001    ESTGFRAFRICA >60.0 05/10/2019 1001    EGFRNONAA >60.0 05/10/2019 1001          PSA 0.06    Assessment:       1. 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 Leukocytosis, unspecified type    3. Diarrhea, unspecified type        Plan:         Doing well, clinically stable.   Labs reviewed and appropriate. WBC count elevated- steroid induced. PSA 0.06 today.   Continue Zytiga and Prednisone.  Next BMD 12/2019.  RTC to see Dr. Escobar or myself in 1 month with CBC, CMP, Vit D, PSA.     In regards to intermittent diarrhea- discussed keeping food diary as it may be related to high fat intake. Also may be stress related. Colonoscopy UTD.  If continues, refer to GI.   Encouraged probiotic for now.     Patient is in agreement with the proposed treatment plan. All questions were answered to the patient's satisfaction. Pt knows to call clinic for any new or worsening symptoms and if anything is needed before the next clinic visit.      Denisa Couch, FNP-C  Hematology & Oncology  [address] [facility] [place] [address]  . [phone]  Fax. [phone]     I spent 30 minutes (face to face) with the patient, more than 50% was in counseling and coordination of care as detailed above.       Distress Screening Results: Psychosocial Distress screening score of Distress Score: 0 noted and reviewed. No intervention indicated.

## 2019-05-10 ENCOUNTER — OFFICE VISIT (OUTPATIENT)
Dept: HEMATOLOGY/ONCOLOGY | Facility: CLINIC | Age: 54
End: 2019-05-10
Payer: COMMERCIAL

## 2019-05-10 ENCOUNTER — LAB VISIT (OUTPATIENT)
Dept: LAB | Facility: HOSPITAL | Age: 54
End: 2019-05-10
Attending: INTERNAL MEDICINE
Payer: COMMERCIAL

## 2019-05-10 VITALS
RESPIRATION RATE: 16 BRPM | WEIGHT: 198.63 LBS | HEIGHT: 69 IN | HEART RATE: 77 BPM | TEMPERATURE: 99 F | SYSTOLIC BLOOD PRESSURE: 134 MMHG | DIASTOLIC BLOOD PRESSURE: 95 MMHG | BODY MASS INDEX: 29.42 KG/M2 | OXYGEN SATURATION: 95 %

## 2019-05-10 DIAGNOSIS — C61 PROSTATE CANCER METASTATIC TO INTRAPELVIC LYMPH NODE: ICD-10-CM

## 2019-05-10 DIAGNOSIS — R19.7 DIARRHEA, UNSPECIFIED TYPE: ICD-10-CM

## 2019-05-10 DIAGNOSIS — C77.5 PROSTATE CANCER METASTATIC TO INTRAPELVIC LYMPH NODE: ICD-10-CM

## 2019-05-10 DIAGNOSIS — C77.5 PROSTATE CANCER METASTATIC TO INTRAPELVIC LYMPH NODE: Primary | ICD-10-CM

## 2019-05-10 DIAGNOSIS — C61 PROSTATE CANCER METASTATIC TO INTRAPELVIC LYMPH NODE: Primary | ICD-10-CM

## 2019-05-10 DIAGNOSIS — D72.829 LEUKOCYTOSIS, UNSPECIFIED TYPE: ICD-10-CM

## 2019-05-10 LAB
ALBUMIN SERPL BCP-MCNC: 3.6 G/DL (ref 3.5–5.2)
ALP SERPL-CCNC: 93 U/L (ref 55–135)
ALT SERPL W/O P-5'-P-CCNC: 19 U/L (ref 10–44)
ANION GAP SERPL CALC-SCNC: 10 MMOL/L (ref 8–16)
AST SERPL-CCNC: 15 U/L (ref 10–40)
BASOPHILS # BLD AUTO: 0.05 K/UL (ref 0–0.2)
BASOPHILS NFR BLD: 0.4 % (ref 0–1.9)
BILIRUB SERPL-MCNC: 0.3 MG/DL (ref 0.1–1)
BUN SERPL-MCNC: 14 MG/DL (ref 6–20)
CALCIUM SERPL-MCNC: 9.5 MG/DL (ref 8.7–10.5)
CHLORIDE SERPL-SCNC: 108 MMOL/L (ref 95–110)
CO2 SERPL-SCNC: 23 MMOL/L (ref 23–29)
COMPLEXED PSA SERPL-MCNC: 0.06 NG/ML (ref 0–4)
CREAT SERPL-MCNC: 0.9 MG/DL (ref 0.5–1.4)
DIFFERENTIAL METHOD: ABNORMAL
EOSINOPHIL # BLD AUTO: 0.2 K/UL (ref 0–0.5)
EOSINOPHIL NFR BLD: 1.7 % (ref 0–8)
ERYTHROCYTE [DISTWIDTH] IN BLOOD BY AUTOMATED COUNT: 14.1 % (ref 11.5–14.5)
EST. GFR  (AFRICAN AMERICAN): >60 ML/MIN/1.73 M^2
EST. GFR  (NON AFRICAN AMERICAN): >60 ML/MIN/1.73 M^2
GLUCOSE SERPL-MCNC: 109 MG/DL (ref 70–110)
HCT VFR BLD AUTO: 43 % (ref 40–54)
HGB BLD-MCNC: 14 G/DL (ref 14–18)
IMM GRANULOCYTES # BLD AUTO: 0.1 K/UL (ref 0–0.04)
IMM GRANULOCYTES NFR BLD AUTO: 0.7 % (ref 0–0.5)
LYMPHOCYTES # BLD AUTO: 2.3 K/UL (ref 1–4.8)
LYMPHOCYTES NFR BLD: 15.8 % (ref 18–48)
MCH RBC QN AUTO: 28.6 PG (ref 27–31)
MCHC RBC AUTO-ENTMCNC: 32.6 G/DL (ref 32–36)
MCV RBC AUTO: 88 FL (ref 82–98)
MONOCYTES # BLD AUTO: 0.9 K/UL (ref 0.3–1)
MONOCYTES NFR BLD: 6.2 % (ref 4–15)
NEUTROPHILS # BLD AUTO: 10.7 K/UL (ref 1.8–7.7)
NEUTROPHILS NFR BLD: 75.2 % (ref 38–73)
NRBC BLD-RTO: 0 /100 WBC
PLATELET # BLD AUTO: 250 K/UL (ref 150–350)
PMV BLD AUTO: 10 FL (ref 9.2–12.9)
POTASSIUM SERPL-SCNC: 3.6 MMOL/L (ref 3.5–5.1)
PROT SERPL-MCNC: 6.3 G/DL (ref 6–8.4)
RBC # BLD AUTO: 4.89 M/UL (ref 4.6–6.2)
SODIUM SERPL-SCNC: 141 MMOL/L (ref 136–145)
WBC # BLD AUTO: 14.27 K/UL (ref 3.9–12.7)

## 2019-05-10 PROCEDURE — 99999 PR PBB SHADOW E&M-EST. PATIENT-LVL III: CPT | Mod: PBBFAC,,, | Performed by: NURSE PRACTITIONER

## 2019-05-10 PROCEDURE — 36415 COLL VENOUS BLD VENIPUNCTURE: CPT

## 2019-05-10 PROCEDURE — 80053 COMPREHEN METABOLIC PANEL: CPT

## 2019-05-10 PROCEDURE — 84153 ASSAY OF PSA TOTAL: CPT

## 2019-05-10 PROCEDURE — 3008F PR BODY MASS INDEX (BMI) DOCUMENTED: ICD-10-PCS | Mod: CPTII,S$GLB,, | Performed by: NURSE PRACTITIONER

## 2019-05-10 PROCEDURE — 3008F BODY MASS INDEX DOCD: CPT | Mod: CPTII,S$GLB,, | Performed by: NURSE PRACTITIONER

## 2019-05-10 PROCEDURE — 99214 OFFICE O/P EST MOD 30 MIN: CPT | Mod: S$GLB,,, | Performed by: NURSE PRACTITIONER

## 2019-05-10 PROCEDURE — 99214 PR OFFICE/OUTPT VISIT, EST, LEVL IV, 30-39 MIN: ICD-10-PCS | Mod: S$GLB,,, | Performed by: NURSE PRACTITIONER

## 2019-05-10 PROCEDURE — 99999 PR PBB SHADOW E&M-EST. PATIENT-LVL III: ICD-10-PCS | Mod: PBBFAC,,, | Performed by: NURSE PRACTITIONER

## 2019-05-10 PROCEDURE — 85025 COMPLETE CBC W/AUTO DIFF WBC: CPT

## 2019-06-07 ENCOUNTER — OFFICE VISIT (OUTPATIENT)
Dept: HEMATOLOGY/ONCOLOGY | Facility: CLINIC | Age: 54
End: 2019-06-07
Payer: COMMERCIAL

## 2019-06-07 ENCOUNTER — LAB VISIT (OUTPATIENT)
Dept: LAB | Facility: HOSPITAL | Age: 54
End: 2019-06-07
Attending: INTERNAL MEDICINE
Payer: COMMERCIAL

## 2019-06-07 VITALS
SYSTOLIC BLOOD PRESSURE: 142 MMHG | OXYGEN SATURATION: 95 % | RESPIRATION RATE: 16 BRPM | BODY MASS INDEX: 29.42 KG/M2 | HEART RATE: 76 BPM | HEIGHT: 69 IN | WEIGHT: 198.63 LBS | DIASTOLIC BLOOD PRESSURE: 93 MMHG | TEMPERATURE: 99 F

## 2019-06-07 DIAGNOSIS — D72.829 LEUKOCYTOSIS, UNSPECIFIED TYPE: ICD-10-CM

## 2019-06-07 DIAGNOSIS — C61 PROSTATE CANCER METASTATIC TO INTRAPELVIC LYMPH NODE: Primary | ICD-10-CM

## 2019-06-07 DIAGNOSIS — C77.5 PROSTATE CANCER METASTATIC TO INTRAPELVIC LYMPH NODE: ICD-10-CM

## 2019-06-07 DIAGNOSIS — C61 PROSTATE CANCER METASTATIC TO INTRAPELVIC LYMPH NODE: ICD-10-CM

## 2019-06-07 DIAGNOSIS — C77.5 PROSTATE CANCER METASTATIC TO INTRAPELVIC LYMPH NODE: Primary | ICD-10-CM

## 2019-06-07 DIAGNOSIS — D64.9 ANEMIA, UNSPECIFIED TYPE: ICD-10-CM

## 2019-06-07 PROBLEM — R19.7 DIARRHEA, UNSPECIFIED: Status: RESOLVED | Noted: 2019-05-10 | Resolved: 2019-06-07

## 2019-06-07 LAB
25(OH)D3+25(OH)D2 SERPL-MCNC: 43 NG/ML (ref 30–96)
ALBUMIN SERPL BCP-MCNC: 3.7 G/DL (ref 3.5–5.2)
ALP SERPL-CCNC: 92 U/L (ref 55–135)
ALT SERPL W/O P-5'-P-CCNC: 20 U/L (ref 10–44)
ANION GAP SERPL CALC-SCNC: 7 MMOL/L (ref 8–16)
AST SERPL-CCNC: 16 U/L (ref 10–40)
BILIRUB SERPL-MCNC: 0.4 MG/DL (ref 0.1–1)
BUN SERPL-MCNC: 13 MG/DL (ref 6–20)
CALCIUM SERPL-MCNC: 9.6 MG/DL (ref 8.7–10.5)
CHLORIDE SERPL-SCNC: 107 MMOL/L (ref 95–110)
CO2 SERPL-SCNC: 28 MMOL/L (ref 23–29)
COMPLEXED PSA SERPL-MCNC: 0.09 NG/ML (ref 0–4)
CREAT SERPL-MCNC: 0.9 MG/DL (ref 0.5–1.4)
ERYTHROCYTE [DISTWIDTH] IN BLOOD BY AUTOMATED COUNT: 14.3 % (ref 11.5–14.5)
EST. GFR  (AFRICAN AMERICAN): >60 ML/MIN/1.73 M^2
EST. GFR  (NON AFRICAN AMERICAN): >60 ML/MIN/1.73 M^2
GLUCOSE SERPL-MCNC: 101 MG/DL (ref 70–110)
HCT VFR BLD AUTO: 41.8 % (ref 40–54)
HGB BLD-MCNC: 13.8 G/DL (ref 14–18)
IMM GRANULOCYTES # BLD AUTO: 0.07 K/UL (ref 0–0.04)
MCH RBC QN AUTO: 28.5 PG (ref 27–31)
MCHC RBC AUTO-ENTMCNC: 33 G/DL (ref 32–36)
MCV RBC AUTO: 86 FL (ref 82–98)
NEUTROPHILS # BLD AUTO: 8.4 K/UL (ref 1.8–7.7)
PLATELET # BLD AUTO: 250 K/UL (ref 150–350)
PMV BLD AUTO: 10.3 FL (ref 9.2–12.9)
POTASSIUM SERPL-SCNC: 3.9 MMOL/L (ref 3.5–5.1)
PROT SERPL-MCNC: 6.4 G/DL (ref 6–8.4)
RBC # BLD AUTO: 4.85 M/UL (ref 4.6–6.2)
SODIUM SERPL-SCNC: 142 MMOL/L (ref 136–145)
WBC # BLD AUTO: 12.5 K/UL (ref 3.9–12.7)

## 2019-06-07 PROCEDURE — 3008F PR BODY MASS INDEX (BMI) DOCUMENTED: ICD-10-PCS | Mod: CPTII,S$GLB,, | Performed by: NURSE PRACTITIONER

## 2019-06-07 PROCEDURE — 84153 ASSAY OF PSA TOTAL: CPT

## 2019-06-07 PROCEDURE — 85027 COMPLETE CBC AUTOMATED: CPT

## 2019-06-07 PROCEDURE — 99999 PR PBB SHADOW E&M-EST. PATIENT-LVL III: CPT | Mod: PBBFAC,,, | Performed by: NURSE PRACTITIONER

## 2019-06-07 PROCEDURE — 99214 PR OFFICE/OUTPT VISIT, EST, LEVL IV, 30-39 MIN: ICD-10-PCS | Mod: S$GLB,,, | Performed by: NURSE PRACTITIONER

## 2019-06-07 PROCEDURE — 36415 COLL VENOUS BLD VENIPUNCTURE: CPT

## 2019-06-07 PROCEDURE — 80053 COMPREHEN METABOLIC PANEL: CPT

## 2019-06-07 PROCEDURE — 99214 OFFICE O/P EST MOD 30 MIN: CPT | Mod: S$GLB,,, | Performed by: NURSE PRACTITIONER

## 2019-06-07 PROCEDURE — 99999 PR PBB SHADOW E&M-EST. PATIENT-LVL III: ICD-10-PCS | Mod: PBBFAC,,, | Performed by: NURSE PRACTITIONER

## 2019-06-07 PROCEDURE — 82306 VITAMIN D 25 HYDROXY: CPT

## 2019-06-07 PROCEDURE — 3008F BODY MASS INDEX DOCD: CPT | Mod: CPTII,S$GLB,, | Performed by: NURSE PRACTITIONER

## 2019-06-07 NOTE — PROGRESS NOTES
"Subjective:       Patient ID: Martin Alves is a 53 y.o. male.    Chief Complaint: Follow-up    HPI     Mr. Alves is a 53 y.o. male who returns for follow up and treatment of metastatic prostate cancer. He is tolerating the Zytiga and Prednisone well and without side effects.     Feels good.   No further diarrhea- change diet.   Last Colonoscopy 2017 or 2018 - In [place] - Dr. Menon.  No abdominal pain or blood/mucus in stools.   No fever/chills.   Bruising on arms lately.   No urinary complaints.    He is active and has good energy level.  No pain issues    Lupron on 5/1/19- next due in 3 months,  receives with local urologist.      PSA review:        BMD 12/18/18:   NORMAL BMD OF THE HIP AND LUMBAR SPINE.  THE TBS T-SCORE L1-L4 IS -0.4.  RECOMMENDATIONS of [facility]:    1.  Calcium 1000 mg daily and vitamin D 600 units daily, adequate exercise.    2.  Keep prednisone dose to a minimum.    3.  Repeat BMD in 1-2 years depending on prednisone dose.          Oncologic History See below    Oncologic Treatment  Zytiga and Prednisone    Pathology  adenocarcinoma      Oncology History:  - 2015 having difficulty urinating- urgency and frequency  Saw Dr. Monson  PSA= 4.6 ng/ml and placed on Flomax  Advised to recheck in 1 month and it was 3.9 ng/ml  - early 2016 PSA- 2/16 = 7.8 ng/ml  - 2/16 prostate biopsy- told very aggressive Erika=9  Placed on lupron q 3 months at that time (last 10/18, due again 1/19)  MRI prostate  CT scans- seen in LNs and base of bladder  Bone scan- negative  - told inoperable  - referred to Dr. Kirkland, medical oncology  - port placed 3/21/16  Initiated Taxotere q 3 weeks x 6 (started on 3/25/16)  - PSA dropped significantly   - plan was to rescan and check PSA after 3 cycles- delays with insurance so done instead after 2 chemotherapy treatments in 5/19/2016  Told "cancer 98% dead" Lns back to normal size  - 5/26/18 PSA= 0.01 ng/ml  - " 5/27/18 3rd cycle of chemo began  - tolerated chemotherapy well - did receive neupogen x 3 days post  - completed 6 cycles 7/29/16  - was doing scans q 6 months and blood work every 3 months  - Dr. Hoskins left [facility] May 2017- notified by letter  - Dr. Costello-   - 8/17 scans and labs stable  - Dr. Sethi was assigned most recently as medical oncologist  - July 2018 PSA= 1.68 ng/ml  - 10/1518 CT scans and bone scan  - 10/22/18 saw Dr. Sethi  PSA 1.58 ng/ml  LNs increased slightly on scans  Bone scan negative  Advised him to start Zytiga, started 11/6/18 (with Prednisone)  - concerned about medication and side effects  Feels like he did well on chemotherapy  Concerned about MD turnover  Sent to a chemo class and questions why for this regimen and then got a bill for a co-pay  - came here for a second opinion     HM:  Colonoscopy neg 1 yr ago    Review of Systems   Constitutional: Negative for activity change, appetite change, chills, fatigue, fever and unexpected weight change.   HENT: Negative for dental problem, hearing loss, mouth sores, postnasal drip, rhinorrhea, sinus pressure, sore throat, tinnitus, trouble swallowing and voice change.    Eyes: Negative for visual disturbance.   Respiratory: Negative for cough, shortness of breath and wheezing.    Cardiovascular: Negative for chest pain, palpitations and leg swelling.   Gastrointestinal: Negative for abdominal distention, abdominal pain, blood in stool, constipation, diarrhea, nausea and vomiting.   Endocrine: Negative for cold intolerance and heat intolerance.   Genitourinary: Negative for decreased urine volume, difficulty urinating, dysuria, frequency, hematuria and urgency.   Musculoskeletal: Negative for arthralgias, back pain, gait problem, joint swelling, myalgias, neck pain and neck stiffness.   Skin: Negative for color change, pallor, rash and wound.   Neurological: Positive for headaches (rare; stress from work). Negative for dizziness, weakness,  light-headedness and numbness.   Hematological: Negative for adenopathy. Does not bruise/bleed easily.   Psychiatric/Behavioral: Negative for decreased concentration, dysphoric mood and sleep disturbance. The patient is not nervous/anxious.        Objective:      Physical Exam   Constitutional: He is oriented to person, place, and time. He appears well-developed and well-nourished. No distress.   Presents alone.   HENT:   Head: Normocephalic and atraumatic.   Eyes: Pupils are equal, round, and reactive to light. Conjunctivae and EOM are normal. Right eye exhibits no discharge. Left eye exhibits no discharge. No scleral icterus.   Neck: Normal range of motion. Neck supple. No tracheal deviation present. No thyromegaly present.   Cardiovascular: Normal rate, regular rhythm, normal heart sounds and intact distal pulses. Exam reveals no gallop and no friction rub.   No murmur heard.  Pulmonary/Chest: Effort normal and breath sounds normal. No respiratory distress. He has no wheezes. He has no rales. He exhibits no tenderness.   Abdominal: Soft. Bowel sounds are normal. He exhibits no distension and no mass. There is no tenderness. There is no rebound and no guarding.   No organomegaly   Musculoskeletal: Normal range of motion. He exhibits no edema, tenderness or deformity.   Lymphadenopathy:     He has no cervical adenopathy.   Neurological: He is alert and oriented to person, place, and time. He has normal reflexes. No cranial nerve deficit. He exhibits normal muscle tone. Coordination normal.   Skin: Skin is warm and dry. No rash noted. He is not diaphoretic. No erythema. No pallor.   Psychiatric: He has a normal mood and affect. His behavior is normal. Judgment and thought content normal.   Nursing note and vitals reviewed.      WBC   Date Value Ref Range Status   06/07/2019 12.50 3.90 - 12.70 K/uL Final     Hemoglobin   Date Value Ref Range Status   06/07/2019 13.8 (L) 14.0 - 18.0 g/dL Final     Hematocrit   Date  Value Ref Range Status   06/07/2019 41.8 40.0 - 54.0 % Final     Platelets   Date Value Ref Range Status   06/07/2019 250 150 - 350 K/uL Final     Gran # (ANC)   Date Value Ref Range Status   06/07/2019 8.4 (H) 1.8 - 7.7 K/uL Final     Comment:     The ANC is based on a white cell differential from an   automated cell counter. It has not been microscopically   reviewed for the presence of abnormal cells. Clinical   correlation is required.         Chemistry        Component Value Date/Time     06/07/2019 0936    K 3.9 06/07/2019 0936     06/07/2019 0936    CO2 28 06/07/2019 0936    BUN 13 06/07/2019 0936    CREATININE 0.9 06/07/2019 0936     06/07/2019 0936        Component Value Date/Time    CALCIUM 9.6 06/07/2019 0936    ALKPHOS 92 06/07/2019 0936    AST 16 06/07/2019 0936    ALT 20 06/07/2019 0936    BILITOT 0.4 06/07/2019 0936    ESTGFRAFRICA >60.0 06/07/2019 0936    EGFRNONAA >60.0 06/07/2019 0936          PSA 0.09    Assessment:       1. 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 Anemia, unspecified type        Plan:         Doing well, clinically stable.   Labs reviewed and appropriate.  PSA 0.09 today.   Vit D normal.   Continue Zytiga and Prednisone.  Next BMD 12/2019.  RTC to see Dr. Escobar  in 1 month with CBC, CMP,  PSA.   Continue f/u with urologist- next Lupron due 8/2019       Patient is in agreement with the proposed treatment plan. All questions were answered to the patient's satisfaction. Pt knows to call clinic for any new or worsening symptoms and if anything is needed before the next clinic visit.      Denisa Couch, JOSELYNP-C  Hematology & Oncology  [facility] [place] [address]  ph. [phone]  Fax. [phone]     I spent 30 minutes (face to face) with the patient, more than 50% was in counseling and coordination of care as detailed above.         Distress Screening Results: Psychosocial Distress screening score of Distress Score: 1  noted and reviewed. No intervention indicated.

## 2019-07-23 ENCOUNTER — LAB VISIT (OUTPATIENT)
Dept: LAB | Facility: HOSPITAL | Age: 54
End: 2019-07-23
Payer: COMMERCIAL

## 2019-07-23 ENCOUNTER — OFFICE VISIT (OUTPATIENT)
Dept: HEMATOLOGY/ONCOLOGY | Facility: CLINIC | Age: 54
End: 2019-07-23
Attending: INTERNAL MEDICINE
Payer: COMMERCIAL

## 2019-07-23 VITALS
TEMPERATURE: 98 F | RESPIRATION RATE: 16 BRPM | SYSTOLIC BLOOD PRESSURE: 136 MMHG | WEIGHT: 196.44 LBS | HEIGHT: 69 IN | OXYGEN SATURATION: 95 % | HEART RATE: 76 BPM | BODY MASS INDEX: 29.09 KG/M2 | DIASTOLIC BLOOD PRESSURE: 93 MMHG

## 2019-07-23 DIAGNOSIS — C61 PROSTATE CANCER: ICD-10-CM

## 2019-07-23 DIAGNOSIS — C61 PROSTATE CANCER METASTATIC TO INTRAPELVIC LYMPH NODE: ICD-10-CM

## 2019-07-23 DIAGNOSIS — C77.5 PROSTATE CANCER METASTATIC TO INTRAPELVIC LYMPH NODE: Primary | ICD-10-CM

## 2019-07-23 DIAGNOSIS — C77.5 PROSTATE CANCER METASTATIC TO INTRAPELVIC LYMPH NODE: ICD-10-CM

## 2019-07-23 DIAGNOSIS — C61 PROSTATE CANCER METASTATIC TO INTRAPELVIC LYMPH NODE: Primary | ICD-10-CM

## 2019-07-23 LAB
ALBUMIN SERPL BCP-MCNC: 3.8 G/DL (ref 3.5–5.2)
ALP SERPL-CCNC: 103 U/L (ref 55–135)
ALT SERPL W/O P-5'-P-CCNC: 22 U/L (ref 10–44)
ANION GAP SERPL CALC-SCNC: 9 MMOL/L (ref 8–16)
AST SERPL-CCNC: 16 U/L (ref 10–40)
BILIRUB SERPL-MCNC: 0.4 MG/DL (ref 0.1–1)
BUN SERPL-MCNC: 12 MG/DL (ref 6–20)
CALCIUM SERPL-MCNC: 9.9 MG/DL (ref 8.7–10.5)
CHLORIDE SERPL-SCNC: 106 MMOL/L (ref 95–110)
CO2 SERPL-SCNC: 27 MMOL/L (ref 23–29)
COMPLEXED PSA SERPL-MCNC: 0.28 NG/ML (ref 0–4)
CREAT SERPL-MCNC: 0.8 MG/DL (ref 0.5–1.4)
ERYTHROCYTE [DISTWIDTH] IN BLOOD BY AUTOMATED COUNT: 14.4 % (ref 11.5–14.5)
EST. GFR  (AFRICAN AMERICAN): >60 ML/MIN/1.73 M^2
EST. GFR  (NON AFRICAN AMERICAN): >60 ML/MIN/1.73 M^2
GLUCOSE SERPL-MCNC: 108 MG/DL (ref 70–110)
HCT VFR BLD AUTO: 42.2 % (ref 40–54)
HGB BLD-MCNC: 13.9 G/DL (ref 14–18)
IMM GRANULOCYTES # BLD AUTO: 0.07 K/UL (ref 0–0.04)
MCH RBC QN AUTO: 28.4 PG (ref 27–31)
MCHC RBC AUTO-ENTMCNC: 32.9 G/DL (ref 32–36)
MCV RBC AUTO: 86 FL (ref 82–98)
NEUTROPHILS # BLD AUTO: 11.1 K/UL (ref 1.8–7.7)
PLATELET # BLD AUTO: 261 K/UL (ref 150–350)
PMV BLD AUTO: 10.4 FL (ref 9.2–12.9)
POTASSIUM SERPL-SCNC: 4.2 MMOL/L (ref 3.5–5.1)
PROT SERPL-MCNC: 6.3 G/DL (ref 6–8.4)
RBC # BLD AUTO: 4.89 M/UL (ref 4.6–6.2)
SODIUM SERPL-SCNC: 142 MMOL/L (ref 136–145)
WBC # BLD AUTO: 14.1 K/UL (ref 3.9–12.7)

## 2019-07-23 PROCEDURE — 85027 COMPLETE CBC AUTOMATED: CPT

## 2019-07-23 PROCEDURE — 80053 COMPREHEN METABOLIC PANEL: CPT

## 2019-07-23 PROCEDURE — 3008F BODY MASS INDEX DOCD: CPT | Mod: CPTII,S$GLB,, | Performed by: INTERNAL MEDICINE

## 2019-07-23 PROCEDURE — 99214 OFFICE O/P EST MOD 30 MIN: CPT | Mod: S$GLB,,, | Performed by: INTERNAL MEDICINE

## 2019-07-23 PROCEDURE — 36415 COLL VENOUS BLD VENIPUNCTURE: CPT

## 2019-07-23 PROCEDURE — 99999 PR PBB SHADOW E&M-EST. PATIENT-LVL III: ICD-10-PCS | Mod: PBBFAC,,, | Performed by: INTERNAL MEDICINE

## 2019-07-23 PROCEDURE — 3008F PR BODY MASS INDEX (BMI) DOCUMENTED: ICD-10-PCS | Mod: CPTII,S$GLB,, | Performed by: INTERNAL MEDICINE

## 2019-07-23 PROCEDURE — 99214 PR OFFICE/OUTPT VISIT, EST, LEVL IV, 30-39 MIN: ICD-10-PCS | Mod: S$GLB,,, | Performed by: INTERNAL MEDICINE

## 2019-07-23 PROCEDURE — 84153 ASSAY OF PSA TOTAL: CPT

## 2019-07-23 PROCEDURE — 99999 PR PBB SHADOW E&M-EST. PATIENT-LVL III: CPT | Mod: PBBFAC,,, | Performed by: INTERNAL MEDICINE

## 2019-07-23 RX ORDER — METHOCARBAMOL 500 MG/1
TABLET, FILM COATED ORAL
Refills: 0 | Status: ON HOLD | COMMUNITY
Start: 2019-06-14 | End: 2020-01-01

## 2019-07-23 RX ORDER — ABIRATERONE ACETATE 500 MG/1
TABLET, FILM COATED ORAL
Qty: 60 TABLET | Refills: 3 | Status: ON HOLD | OUTPATIENT
Start: 2019-07-23 | End: 2020-01-01

## 2019-07-23 NOTE — PROGRESS NOTES
"Subjective:       Patient ID: Martin Alves is a 53 y.o. male.    Chief Complaint: Prostate cancer metastatic to intrapelvic lymph node    HPI     Mr. Alves is a 53 y.o. male who returns for follow up and treatment of metastatic prostate cancer. He is tolerating the Zytiga and Prednisone well and without side effects.   We discussed that PSA slightly up   Active   Feels well  Denies pain complaints    Lupron on 5/1/19- next due in 3 months,  receives with local urologist.      BMD 12/18/18:   NORMAL BMD OF THE HIP AND LUMBAR SPINE.  THE TBS T-SCORE L1-L4 IS -0.4.  RECOMMENDATIONS of [facility]:  1.  Calcium 1000 mg daily and vitamin D 600 units daily, adequate exercise.  2.  Keep prednisone dose to a minimum.  3.  Repeat BMD in 1-2 years depending on prednisone dose.            Oncologic History See below    Oncologic Treatment  Zytiga and Prednisone    Pathology  adenocarcinoma      Oncology History:  - 2015 having difficulty urinating- urgency and frequency  Saw Dr. Monson  PSA= 4.6 ng/ml and placed on Flomax  Advised to recheck in 1 month and it was 3.9 ng/ml  - early 2016 PSA- 2/16 = 7.8 ng/ml  - 2/16 prostate biopsy- told very aggressive [place]=9  Placed on lupron q 3 months at that time (last 10/18, due again 1/19)  MRI prostate  CT scans- seen in LNs and base of bladder  Bone scan- negative  - told inoperable  - referred to Dr. Kirkland, medical oncology  - port placed 3/21/16  Initiated Taxotere q 3 weeks x 6 (started on 3/25/16)  - PSA dropped significantly   - plan was to rescan and check PSA after 3 cycles- delays with insurance so done instead after 2 chemotherapy treatments in 5/19/2016  Told "cancer 98% dead" Lns back to normal size  - 5/26/18 PSA= 0.01 ng/ml  - 5/27/18 3rd cycle of chemo began  - tolerated chemotherapy well - did receive neupogen x 3 days post  - completed 6 cycles 7/29/16  - was doing scans q 6 months and blood work every 3 " months  - Dr. Hoskins left [facility] May 2017- notified by letter  - Dr. Costello-   - 8/17 scans and labs stable  - Dr. Sethi was assigned most recently as medical oncologist  - July 2018 PSA= 1.68 ng/ml  - 10/1518 CT scans and bone scan  - 10/22/18 saw Dr. Sethi  PSA 1.58 ng/ml  LNs increased slightly on scans  Bone scan negative  Advised him to start Zytiga, started 11/6/18 (with Prednisone)  - concerned about medication and side effects  Feels like he did well on chemotherapy  Concerned about MD turnover  Sent to a chemo class and questions why for this regimen and then got a bill for a co-pay  - came here for a second opinion     HM:  Colonoscopy neg 1 yr ago      Review of Systems   Constitutional: Negative for activity change, appetite change, chills, fatigue, fever and unexpected weight change.   HENT: Negative for dental problem, hearing loss, mouth sores, postnasal drip, rhinorrhea, sinus pressure, sore throat, tinnitus, trouble swallowing and voice change.    Eyes: Negative for visual disturbance.   Respiratory: Negative for cough, shortness of breath and wheezing.    Cardiovascular: Negative for chest pain, palpitations and leg swelling.   Gastrointestinal: Positive for diarrhea (on occasion). Negative for abdominal distention, abdominal pain, blood in stool, constipation, nausea and vomiting.   Endocrine: Negative for cold intolerance and heat intolerance.   Genitourinary: Negative for decreased urine volume, difficulty urinating, dysuria, frequency, hematuria and urgency.   Musculoskeletal: Negative for arthralgias, back pain, gait problem, joint swelling, myalgias, neck pain and neck stiffness.   Skin: Negative for color change, pallor, rash and wound.   Neurological: Positive for headaches (rare; stress from work). Negative for dizziness, weakness, light-headedness and numbness.   Hematological: Negative for adenopathy. Does not bruise/bleed easily.   Psychiatric/Behavioral: Negative for decreased  concentration, dysphoric mood and sleep disturbance. The patient is not nervous/anxious.        Objective:      Physical Exam   Constitutional: He is oriented to person, place, and time. He appears well-developed and well-nourished. No distress.   Presents alone.   HENT:   Head: Normocephalic and atraumatic.   Eyes: Pupils are equal, round, and reactive to light. Conjunctivae and EOM are normal. Right eye exhibits no discharge. Left eye exhibits no discharge. No scleral icterus.   Neck: Normal range of motion. Neck supple. No tracheal deviation present. No thyromegaly present.   Cardiovascular: Normal rate, regular rhythm, normal heart sounds and intact distal pulses. Exam reveals no gallop and no friction rub.   No murmur heard.  Pulmonary/Chest: Effort normal and breath sounds normal. No respiratory distress. He has no wheezes. He has no rales. He exhibits no tenderness.   Abdominal: Soft. Bowel sounds are normal. He exhibits no distension and no mass. There is no tenderness. There is no rebound and no guarding.   No organomegaly   Musculoskeletal: Normal range of motion. He exhibits no edema, tenderness or deformity.   Lymphadenopathy:     He has no cervical adenopathy.   Neurological: He is alert and oriented to person, place, and time. He has normal reflexes. No cranial nerve deficit. He exhibits normal muscle tone. Coordination normal.   Skin: Skin is warm and dry. No rash noted. He is not diaphoretic. No erythema. No pallor.   Psychiatric: He has a normal mood and affect. His behavior is normal. Judgment and thought content normal.   Nursing note and vitals reviewed.    Labs- reviewed  Assessment:       1. 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 We discussed rise in PSA  Recommend continue current therapy while pursuing scans      Distress Screening Results: Psychosocial Distress screening score of Distress Score: 0 noted and reviewed. No intervention indicated.

## 2019-08-07 ENCOUNTER — HOSPITAL ENCOUNTER (OUTPATIENT)
Dept: RADIOLOGY | Facility: HOSPITAL | Age: 54
Discharge: HOME | End: 2019-08-07
Attending: INTERNAL MEDICINE
Payer: COMMERCIAL

## 2019-08-07 DIAGNOSIS — C61 PROSTATE CANCER METASTATIC TO INTRAPELVIC LYMPH NODE: ICD-10-CM

## 2019-08-07 DIAGNOSIS — C77.5 PROSTATE CANCER METASTATIC TO INTRAPELVIC LYMPH NODE: ICD-10-CM

## 2019-08-07 PROCEDURE — 78306 NM BONE SCAN WHOLE BODY: ICD-10-PCS | Mod: 26,,, | Performed by: RADIOLOGY

## 2019-08-07 PROCEDURE — 74177 CT ABD & PELVIS W/CONTRAST: CPT | Mod: TC

## 2019-08-07 PROCEDURE — 74177 CT ABD & PELVIS W/CONTRAST: CPT | Mod: 26,,, | Performed by: RADIOLOGY

## 2019-08-07 PROCEDURE — A9503 TC99M MEDRONATE: HCPCS

## 2019-08-07 PROCEDURE — 78306 BONE IMAGING WHOLE BODY: CPT | Mod: 26,,, | Performed by: RADIOLOGY

## 2019-08-07 PROCEDURE — 74177 CT ABDOMEN PELVIS WITH CONTRAST: ICD-10-PCS | Mod: 26,,, | Performed by: RADIOLOGY

## 2019-08-07 PROCEDURE — 25500020 PHARM REV CODE 255: Performed by: INTERNAL MEDICINE

## 2019-08-07 RX ADMIN — IOHEXOL 100 ML: 350 INJECTION, SOLUTION INTRAVENOUS at 09:08

## 2019-08-09 ENCOUNTER — HOSPITAL ENCOUNTER (EMERGENCY)
Facility: HOSPITAL | Age: 54
Discharge: HOME | End: 2019-08-11
Attending: EMERGENCY MEDICINE
Payer: COMMERCIAL

## 2019-08-09 VITALS
DIASTOLIC BLOOD PRESSURE: 83 MMHG | BODY MASS INDEX: 27.3 KG/M2 | WEIGHT: 195 LBS | RESPIRATION RATE: 19 BRPM | OXYGEN SATURATION: 95 % | TEMPERATURE: 100 F | HEIGHT: 71 IN | SYSTOLIC BLOOD PRESSURE: 127 MMHG | HEART RATE: 90 BPM

## 2019-08-09 DIAGNOSIS — R00.0 TACHYCARDIA: ICD-10-CM

## 2019-08-09 DIAGNOSIS — J18.9 PNEUMONIA OF LEFT LUNG DUE TO INFECTIOUS ORGANISM, UNSPECIFIED PART OF LUNG: Primary | ICD-10-CM

## 2019-08-09 LAB
ALBUMIN SERPL BCP-MCNC: 3.7 G/DL (ref 3.5–5.2)
ALP SERPL-CCNC: 108 U/L (ref 55–135)
ALT SERPL W/O P-5'-P-CCNC: 21 U/L (ref 10–44)
ANION GAP SERPL CALC-SCNC: 13 MMOL/L (ref 8–16)
AST SERPL-CCNC: 19 U/L (ref 10–40)
BACTERIA #/AREA URNS AUTO: ABNORMAL /HPF
BASOPHILS # BLD AUTO: 0.03 K/UL (ref 0–0.2)
BASOPHILS NFR BLD: 0.2 % (ref 0–1.9)
BILIRUB SERPL-MCNC: 1.1 MG/DL (ref 0.1–1)
BILIRUB UR QL STRIP: NEGATIVE
BUN SERPL-MCNC: 12 MG/DL (ref 6–20)
CALCIUM SERPL-MCNC: 9.6 MG/DL (ref 8.7–10.5)
CHLORIDE SERPL-SCNC: 102 MMOL/L (ref 95–110)
CLARITY UR REFRACT.AUTO: ABNORMAL
CO2 SERPL-SCNC: 23 MMOL/L (ref 23–29)
COLOR UR AUTO: ABNORMAL
CREAT SERPL-MCNC: 0.8 MG/DL (ref 0.5–1.4)
DIFFERENTIAL METHOD: ABNORMAL
EOSINOPHIL # BLD AUTO: 0 K/UL (ref 0–0.5)
EOSINOPHIL NFR BLD: 0.1 % (ref 0–8)
ERYTHROCYTE [DISTWIDTH] IN BLOOD BY AUTOMATED COUNT: 14.1 % (ref 11.5–14.5)
EST. GFR  (AFRICAN AMERICAN): >60 ML/MIN/1.73 M^2
EST. GFR  (NON AFRICAN AMERICAN): >60 ML/MIN/1.73 M^2
GLUCOSE SERPL-MCNC: 120 MG/DL (ref 70–110)
GLUCOSE UR QL STRIP: NEGATIVE
HCT VFR BLD AUTO: 44.3 % (ref 40–54)
HGB BLD-MCNC: 14.7 G/DL (ref 14–18)
HGB UR QL STRIP: NEGATIVE
HYALINE CASTS UR QL AUTO: 3 /LPF
IMM GRANULOCYTES # BLD AUTO: 0.1 K/UL (ref 0–0.04)
IMM GRANULOCYTES NFR BLD AUTO: 0.5 % (ref 0–0.5)
INFLUENZA A, MOLECULAR: NEGATIVE
INFLUENZA B, MOLECULAR: NEGATIVE
KETONES UR QL STRIP: ABNORMAL
LACTATE SERPL-SCNC: 0.8 MMOL/L (ref 0.5–2.2)
LEUKOCYTE ESTERASE UR QL STRIP: NEGATIVE
LYMPHOCYTES # BLD AUTO: 0.9 K/UL (ref 1–4.8)
LYMPHOCYTES NFR BLD: 4.9 % (ref 18–48)
MCH RBC QN AUTO: 28.4 PG (ref 27–31)
MCHC RBC AUTO-ENTMCNC: 33.2 G/DL (ref 32–36)
MCV RBC AUTO: 86 FL (ref 82–98)
MICROSCOPIC COMMENT: ABNORMAL
MONOCYTES # BLD AUTO: 1.5 K/UL (ref 0.3–1)
MONOCYTES NFR BLD: 7.8 % (ref 4–15)
NEUTROPHILS # BLD AUTO: 16.4 K/UL (ref 1.8–7.7)
NEUTROPHILS NFR BLD: 86.5 % (ref 38–73)
NITRITE UR QL STRIP: NEGATIVE
NRBC BLD-RTO: 0 /100 WBC
PH UR STRIP: 6 [PH] (ref 5–8)
PLATELET # BLD AUTO: 224 K/UL (ref 150–350)
PMV BLD AUTO: 10.4 FL (ref 9.2–12.9)
POTASSIUM SERPL-SCNC: 3.7 MMOL/L (ref 3.5–5.1)
PROCALCITONIN SERPL IA-MCNC: 0.09 NG/ML
PROT SERPL-MCNC: 7 G/DL (ref 6–8.4)
PROT UR QL STRIP: ABNORMAL
RBC # BLD AUTO: 5.18 M/UL (ref 4.6–6.2)
RBC #/AREA URNS AUTO: 5 /HPF (ref 0–4)
SODIUM SERPL-SCNC: 138 MMOL/L (ref 136–145)
SP GR UR STRIP: 1.02 (ref 1–1.03)
SPECIMEN SOURCE: NORMAL
URN SPEC COLLECT METH UR: ABNORMAL
WBC # BLD AUTO: 18.99 K/UL (ref 3.9–12.7)
WBC #/AREA URNS AUTO: 2 /HPF (ref 0–5)

## 2019-08-09 PROCEDURE — 99284 EMERGENCY DEPT VISIT MOD MDM: CPT | Mod: ,,, | Performed by: EMERGENCY MEDICINE

## 2019-08-09 PROCEDURE — 99285 EMERGENCY DEPT VISIT HI MDM: CPT | Mod: 25

## 2019-08-09 PROCEDURE — 85025 COMPLETE CBC W/AUTO DIFF WBC: CPT

## 2019-08-09 PROCEDURE — 84145 PROCALCITONIN (PCT): CPT

## 2019-08-09 PROCEDURE — 93005 ELECTROCARDIOGRAM TRACING: CPT

## 2019-08-09 PROCEDURE — 83605 ASSAY OF LACTIC ACID: CPT

## 2019-08-09 PROCEDURE — 63600175 PHARM REV CODE 636 W HCPCS: Performed by: EMERGENCY MEDICINE

## 2019-08-09 PROCEDURE — 93010 ELECTROCARDIOGRAM REPORT: CPT | Mod: ,,, | Performed by: INTERNAL MEDICINE

## 2019-08-09 PROCEDURE — 87502 INFLUENZA DNA AMP PROBE: CPT

## 2019-08-09 PROCEDURE — 99284 PR EMERGENCY DEPT VISIT,LEVEL IV: ICD-10-PCS | Mod: ,,, | Performed by: EMERGENCY MEDICINE

## 2019-08-09 PROCEDURE — 80053 COMPREHEN METABOLIC PANEL: CPT

## 2019-08-09 PROCEDURE — 96374 THER/PROPH/DIAG INJ IV PUSH: CPT

## 2019-08-09 PROCEDURE — 96361 HYDRATE IV INFUSION ADD-ON: CPT

## 2019-08-09 PROCEDURE — 93010 EKG 12-LEAD: ICD-10-PCS | Mod: ,,, | Performed by: INTERNAL MEDICINE

## 2019-08-09 PROCEDURE — 81001 URINALYSIS AUTO W/SCOPE: CPT

## 2019-08-09 RX ORDER — LEUPROLIDE ACETATE 1 MG/0.2ML
VIAL (ML) SUBCUTANEOUS
COMMUNITY
Start: 2019-08-12

## 2019-08-09 RX ORDER — LEVOFLOXACIN 750 MG/1
750 TABLET ORAL DAILY
Qty: 4 TABLET | Refills: 0 | Status: SHIPPED | OUTPATIENT
Start: 2019-08-09 | End: 2019-08-13

## 2019-08-09 RX ORDER — SODIUM CHLORIDE 9 MG/ML
1000 INJECTION, SOLUTION INTRAVENOUS
Status: COMPLETED | OUTPATIENT
Start: 2019-08-09 | End: 2019-08-09

## 2019-08-09 RX ORDER — LEVOFLOXACIN 5 MG/ML
750 INJECTION, SOLUTION INTRAVENOUS
Status: COMPLETED | OUTPATIENT
Start: 2019-08-09 | End: 2019-08-09

## 2019-08-09 RX ADMIN — SODIUM CHLORIDE 1000 ML: 0.9 INJECTION, SOLUTION INTRAVENOUS at 01:08

## 2019-08-09 RX ADMIN — LEVOFLOXACIN 750 MG: 750 INJECTION, SOLUTION INTRAVENOUS at 03:08

## 2019-08-09 NOTE — ED PROVIDER NOTES
Encounter Date: 8/9/2019       History     Chief Complaint   Patient presents with    Fever Post Chemo     dr denise told me to come to er, on chemo     53-year-old male with a history of metastatic prostate cancer presents with 2 days of fever.  Patient was at the hospital on Wednesday to get labs, CT scan, and a bone scan.  Following this he started having diffuse body aches.  Next day at fatigue and had to leave work to go home and rest.  He has been running temperatures of 100.4.  Patient is not on IV chemo and has not been since 2016.  Patient is on oral hormonal chemo.  This morning he had vomiting x1 and he has had diarrhea yesterday and today x2.  Patient has associated cough, shortness of breath, and dyspnea on exertion.  He denies chest pain, abdominal pain, or dysuria.  He has taken Tylenol twice today over-the-counter.  A ten point review of systems was completed and is negative except as documented above.  Patient denies any other acute medical complaint.  The patients available PMH, PSH, Social History, medications, allergies, and triage vital signs were reviewed just prior to their medical evaluation.        Review of patient's allergies indicates:  No Known Allergies  Past Medical History:   Diagnosis Date    Prostate cancer     Chemo in 2016     Past Surgical History:   Procedure Laterality Date    PORTACATH PLACEMENT       Family History   Problem Relation Age of Onset    Emphysema Mother     Heart attack Father     No Known Problems Brother     Cerebral aneurysm Maternal Aunt     No Known Problems Maternal Uncle     No Known Problems Paternal Aunt     No Known Problems Paternal Uncle     No Known Problems Maternal Grandmother     No Known Problems Maternal Grandfather     Colon cancer Paternal Grandmother     No Known Problems Paternal Grandfather     No Known Problems Brother     No Known Problems Son     No Known Problems Son      Social History     Tobacco Use    Smoking status:  Former Smoker     Packs/day: 0.50     Years: 40.00     Pack years: 20.00     Types: Cigarettes     Start date: 1978     Last attempt to quit: 2018     Years since quittin.7    Smokeless tobacco: Former User     Types: Chew     Quit date: 2017   Substance Use Topics    Alcohol use: Yes     Alcohol/week: 0.6 oz     Types: 1 Cans of beer per week     Comment: Occasional    Drug use: No     Review of Systems   Constitutional: Positive for fatigue and fever.   HENT: Negative for sore throat and trouble swallowing.    Eyes: Negative for visual disturbance.   Respiratory: Positive for cough and shortness of breath.    Cardiovascular: Negative for chest pain.   Gastrointestinal: Positive for diarrhea, nausea and vomiting. Negative for abdominal pain and blood in stool.   Genitourinary: Negative for dysuria.   Musculoskeletal: Negative for neck pain and neck stiffness.   Skin: Negative for rash and wound.   Allergic/Immunologic: Negative for immunocompromised state.   Neurological: Negative for syncope.   Psychiatric/Behavioral: Negative for confusion.   All other systems reviewed and are negative.      Physical Exam     Initial Vitals [19 1232]   BP Pulse Resp Temp SpO2   129/73 106 18 100.1 °F (37.8 °C) 96 %      MAP       --         Physical Exam    Nursing note and vitals reviewed.  Constitutional: He appears well-developed and well-nourished. He is diaphoretic. No distress.   HENT:   Head: Normocephalic and atraumatic.   Nose: Nose normal.   Mouth/Throat: Oropharynx is clear and moist. No oropharyngeal exudate.   Eyes: Conjunctivae are normal. Right eye exhibits no discharge. Left eye exhibits no discharge.   Neck: Normal range of motion. Neck supple.   Nontender, no meningismus   Cardiovascular: Normal rate, regular rhythm, normal heart sounds and intact distal pulses. Exam reveals no gallop and no friction rub.    No murmur heard.  Pulmonary/Chest: Breath sounds normal. No respiratory  distress. He has no wheezes. He has no rhonchi. He has no rales.   Abdominal: Soft. He exhibits no distension. There is no tenderness. There is no rebound and no guarding.   Musculoskeletal: Normal range of motion. He exhibits no edema or tenderness.   Neurological: He is alert and oriented to person, place, and time. He has normal strength. GCS score is 15. GCS eye subscore is 4. GCS verbal subscore is 5. GCS motor subscore is 6.   Skin: Skin is warm. No rash noted. No erythema.   Psychiatric: He has a normal mood and affect. His behavior is normal. Judgment and thought content normal.         ED Course   Procedures  Labs Reviewed   CBC W/ AUTO DIFFERENTIAL - Abnormal; Notable for the following components:       Result Value    WBC 18.99 (*)     Gran # (ANC) 16.4 (*)     Immature Grans (Abs) 0.10 (*)     Lymph # 0.9 (*)     Mono # 1.5 (*)     Gran% 86.5 (*)     Lymph% 4.9 (*)     All other components within normal limits   COMPREHENSIVE METABOLIC PANEL - Abnormal; Notable for the following components:    Glucose 120 (*)     Total Bilirubin 1.1 (*)     All other components within normal limits   URINALYSIS, REFLEX TO URINE CULTURE - Abnormal; Notable for the following components:    Appearance, UA Hazy (*)     Protein, UA 1+ (*)     Ketones, UA 1+ (*)     All other components within normal limits    Narrative:     Preferred Collection Type->Urine, Clean Catch   URINALYSIS MICROSCOPIC - Abnormal; Notable for the following components:    RBC, UA 5 (*)     Hyaline Casts, UA 3 (*)     All other components within normal limits    Narrative:     Preferred Collection Type->Urine, Clean Catch   INFLUENZA A & B BY MOLECULAR   LACTIC ACID, PLASMA   PROCALCITONIN     EKG Readings: (Independently Interpreted)   Rhythm: Junctional Rhythm. Heart Rate: 81. Ectopy: No Ectopy. Conduction: RBBB. ST Segments: Normal ST Segments. T Waves Flipped: III and AVF.     ECG Results          EKG 12-lead (Final result)  Result time 08/09/19  14:45:06    Final result by Interface, Lab In [facility] (08/09/19 14:45:06)                 Narrative:    Test Reason : R00.0,    Vent. Rate : 081 BPM     Atrial Rate : 086 BPM     P-R Int : 000 ms          QRS Dur : 140 ms      QT Int : 422 ms       P-R-T Axes : 000 106 -18 degrees     QTc Int : 490 ms    Accelerated junctional rhythm  Right bundle branch block  Left posterior fascicular (nuria) block  Abnormal ECG  No previous ECGs available  Confirmed by Marleny Bruner MD ([id number]) on 8/9/2019 2:44:56 PM    Referred By: AAAREFANA MARIA   SELF           Confirmed By:Marleny Bruner MD                            Imaging Results          X-Ray Chest AP Portable (Final result)  Result time 08/09/19 14:28:32    Final result by Daryl Cho III, MD (08/09/19 14:28:32)                 Impression:      Possible mild left perihilar pneumonia.      Electronically signed by: Daryl Cho MD  Date:    08/09/2019  Time:    14:28             Narrative:    EXAMINATION:  XR CHEST AP PORTABLE    CLINICAL HISTORY:  Sepsis;    FINDINGS:  Heart size is upper normal.  There is a possible mild left perihilar pneumonia.  Right lung is clear.                              X-Rays:   Independently Interpreted Readings:   Other Readings:  Reviewed CXR image    Medical Decision Making:   History:   I obtained history from: someone other than patient.  Old Medical Records: I decided to obtain old medical records.  Old Records Summarized: records from clinic visits.  Independently Interpreted Test(s):   I have ordered and independently interpreted X-rays - see prior notes.  I have ordered and independently interpreted EKG Reading(s) - see prior notes  Clinical Tests:   Lab Tests: Ordered and Reviewed  Radiological Study: Ordered and Reviewed  Medical Tests: Ordered and Reviewed  ED Management:  53-year-old male presents for emergent evaluation of acute febrile illness.  Vitals with low-grade temperature and tachycardia.  Physical exam as above.  Labs  with elevated WBC.  CXR with left sided PNA.  Treated with levofloxacin.  Rest of labs unremarkable.  Patient improved in ED.  No hypoxia, sepsis, or respiratory failure.  Will discharge with Rx for po levofloxacin.  He has follow-up Tuesday with Oncology.  Patient will return to ED for worsening symptoms, inability to eat/drink, fever greater than 100.4, or any other concerns.  Did bedside teaching with return precautions.  All questions answered.  The patient acknowledges understanding.  Gave verbal discharge instructions.                      Clinical Impression:   Final diagnoses:  [R00.0] Tachycardia  [J18.9] Pneumonia of left lung due to infectious organism, unspecified part of lung (Primary)      Disposition:   Disposition: Discharged  Condition: Stable    Level of Complexity:  High, level 5.                    Fabrizio Byers MD  08/09/19 [id number]

## 2019-08-13 ENCOUNTER — OFFICE VISIT (OUTPATIENT)
Dept: HEMATOLOGY/ONCOLOGY | Facility: CLINIC | Age: 54
End: 2019-08-13
Payer: COMMERCIAL

## 2019-08-13 VITALS
TEMPERATURE: 99 F | HEART RATE: 97 BPM | OXYGEN SATURATION: 95 % | WEIGHT: 189.63 LBS | DIASTOLIC BLOOD PRESSURE: 91 MMHG | RESPIRATION RATE: 18 BRPM | SYSTOLIC BLOOD PRESSURE: 131 MMHG | HEIGHT: 71 IN | BODY MASS INDEX: 26.55 KG/M2

## 2019-08-13 DIAGNOSIS — C61 PROSTATE CANCER: ICD-10-CM

## 2019-08-13 DIAGNOSIS — C61 PROSTATE CANCER METASTATIC TO INTRAPELVIC LYMPH NODE: Primary | ICD-10-CM

## 2019-08-13 DIAGNOSIS — C77.5 PROSTATE CANCER METASTATIC TO INTRAPELVIC LYMPH NODE: Primary | ICD-10-CM

## 2019-08-13 PROCEDURE — 99214 OFFICE O/P EST MOD 30 MIN: CPT | Mod: S$GLB,,, | Performed by: INTERNAL MEDICINE

## 2019-08-13 PROCEDURE — 99214 PR OFFICE/OUTPT VISIT, EST, LEVL IV, 30-39 MIN: ICD-10-PCS | Mod: S$GLB,,, | Performed by: INTERNAL MEDICINE

## 2019-08-13 PROCEDURE — 99999 PR PBB SHADOW E&M-EST. PATIENT-LVL IV: ICD-10-PCS | Mod: PBBFAC,,, | Performed by: INTERNAL MEDICINE

## 2019-08-13 PROCEDURE — 3008F BODY MASS INDEX DOCD: CPT | Mod: CPTII,S$GLB,, | Performed by: INTERNAL MEDICINE

## 2019-08-13 PROCEDURE — 3008F PR BODY MASS INDEX (BMI) DOCUMENTED: ICD-10-PCS | Mod: CPTII,S$GLB,, | Performed by: INTERNAL MEDICINE

## 2019-08-13 PROCEDURE — 99999 PR PBB SHADOW E&M-EST. PATIENT-LVL IV: CPT | Mod: PBBFAC,,, | Performed by: INTERNAL MEDICINE

## 2019-08-13 RX ORDER — PREDNISONE 5 MG/1
TABLET ORAL
Qty: 60 TABLET | Refills: 3 | Status: ON HOLD | OUTPATIENT
Start: 2019-08-13 | End: 2020-01-01

## 2019-08-13 NOTE — PROGRESS NOTES
Subjective:       Patient ID: Maritn Alves is a 53 y.o. male.    Chief Complaint: Prostate cancer metastatic to intrapelvic lymph node    HPI     Recent pneumonia- diagnosed by CXR  Completed antibiotic today- Levaquin, required IV 1st day and then PO  Starting to feel better  + cough- better  Afebrile  No dyspnea    Mr. Alves is a 53 y.o. male who returns for follow up and treatment of metastatic prostate cancer. He is tolerating the Zytiga and Prednisone well and without side effects.   We discussed that PSA continues to trend up  Active   Feels well  Denies pain complaints     Lupron received with his urologist in      Most recent imagin19 bone scan:  Focal area of increased uptake at the level of the left lesser trochanter.  Metastatic disease is felt less likely, however cannot be completely excluded.  Attention on follow-up is recommended.    19 CT scans:  1. No definite evidence of metastatic disease in this patient with history of prostate cancer.  2. Several normal-sized periaortic and iliac chain nodes.  3. Several scattered subcentimeter hepatic hypodensities too small to characterize.  4. Right adrenal adenoma.    BMD 18:   NORMAL BMD OF THE HIP AND LUMBAR SPINE.  THE TBS T-SCORE L1-L4 IS -0.4.  RECOMMENDATIONS of [facility]:  1.  Calcium 1000 mg daily and vitamin D 600 units daily, adequate exercise.  2.  Keep prednisone dose to a minimum.  3.  Repeat BMD in 1-2 years depending on prednisone dose.            Oncologic History See below    Oncologic Treatment  Zytiga and Prednisone    Pathology  adenocarcinoma      Oncology History:  -  having difficulty urinating- urgency and frequency  Saw Dr. Monson  PSA= 4.6 ng/ml and placed on Flomax  Advised to recheck in 1 month and it was 3.9 ng/ml  - early  PSA-  = 7.8 ng/ml  -  prostate biopsy- told very aggressive Erika=9  Placed on lupron q 3 months at that time  "(last 10/18, due again 1/19)  MRI prostate  CT scans- seen in LNs and base of bladder  Bone scan- negative  - told inoperable  - referred to Dr. Kirkland, medical oncology  - port placed 3/21/16  Initiated Taxotere q 3 weeks x 6 (started on 3/25/16)  - PSA dropped significantly   - plan was to rescan and check PSA after 3 cycles- delays with insurance so done instead after 2 chemotherapy treatments in 5/19/2016  Told "cancer 98% dead" Lns back to normal size  - 5/26/18 PSA= 0.01 ng/ml  - 5/27/18 3rd cycle of chemo began  - tolerated chemotherapy well - did receive neupogen x 3 days post  - completed 6 cycles 7/29/16  - was doing scans q 6 months and blood work every 3 months  - Dr. Hoskins left [facility] May 2017- notified by letter  - Dr. Costello-   - 8/17 scans and labs stable  - Dr. Sethi was assigned most recently as medical oncologist  - July 2018 PSA= 1.68 ng/ml  - 10/1518 CT scans and bone scan  - 10/22/18 saw Dr. Sethi  PSA 1.58 ng/ml  LNs increased slightly on scans  Bone scan negative  Advised him to start Zytiga, started 11/6/18 (with Prednisone)  - concerned about medication and side effects  Feels like he did well on chemotherapy  Concerned about MD turnover  Sent to a chemo class and questions why for this regimen and then got a bill for a co-pay  - came here for a second opinion     HM:  Colonoscopy neg 1 yr ago    Review of Systems   Constitutional: Negative for activity change, appetite change, chills, fatigue, fever and unexpected weight change.   HENT: Negative for dental problem, hearing loss, mouth sores, postnasal drip, rhinorrhea, sinus pressure, sore throat, tinnitus, trouble swallowing and voice change.    Eyes: Negative for visual disturbance.   Respiratory: Positive for cough (better). Negative for shortness of breath and wheezing.    Cardiovascular: Negative for chest pain, palpitations and leg swelling.   Gastrointestinal: Negative for abdominal distention, abdominal pain, blood in stool, " constipation, diarrhea, nausea and vomiting.   Endocrine: Negative for cold intolerance and heat intolerance.   Genitourinary: Negative for decreased urine volume, difficulty urinating, dysuria, frequency, hematuria and urgency.   Musculoskeletal: Negative for arthralgias, back pain, gait problem, joint swelling, myalgias, neck pain and neck stiffness.   Skin: Negative for color change, pallor, rash and wound.   Neurological: Negative for dizziness, weakness, light-headedness, numbness and headaches.   Hematological: Negative for adenopathy. Does not bruise/bleed easily.   Psychiatric/Behavioral: Negative for decreased concentration, dysphoric mood and sleep disturbance. The patient is not nervous/anxious.        Objective:      Physical Exam   Constitutional: He is oriented to person, place, and time. He appears well-developed and well-nourished. No distress.   Presents with his wife  ECOG=0   HENT:   Head: Normocephalic and atraumatic.   Eyes: Pupils are equal, round, and reactive to light. Conjunctivae and EOM are normal. Right eye exhibits no discharge. Left eye exhibits no discharge. No scleral icterus.   Neck: Normal range of motion. Neck supple. No tracheal deviation present. No thyromegaly present.   Cardiovascular: Normal rate, regular rhythm, normal heart sounds and intact distal pulses. Exam reveals no gallop and no friction rub.   No murmur heard.  Pulmonary/Chest: Effort normal and breath sounds normal. No respiratory distress. He has no wheezes. He has no rales. He exhibits no tenderness.   Abdominal: Soft. Bowel sounds are normal. He exhibits no distension and no mass. There is no tenderness. There is no rebound and no guarding.   No organomegaly   Musculoskeletal: Normal range of motion. He exhibits no edema, tenderness or deformity.   Lymphadenopathy:     He has no cervical adenopathy.   Neurological: He is alert and oriented to person, place, and time. He has normal reflexes. No cranial nerve  deficit. He exhibits normal muscle tone. Coordination normal.   Skin: Skin is warm and dry. No rash noted. He is not diaphoretic. No erythema. No pallor.   Psychiatric: He has a normal mood and affect. His behavior is normal. Judgment and thought content normal.   Nursing note and vitals reviewed.      Assessment:       1. 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 We discussed rising PSA  No obvious metastatic disease on scans but we discussed known micrometastatic disease based on history  With rising PSA we discussed management options  He recalls having testing done on Pathology but unsure if PDL-1 so we will need to check  Obtain path for Strata  He did have previous response to Taxotere which could be tried again as brief exposure  There is also an option for Jevtana  If PDL1 > 1% immunotherapy an option  Send Strata  Assess for trials

## 2019-08-14 NOTE — PLAN OF CARE
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 20  Line of therapy: Third Line  Intent of Therapy:  Non-Curative / Palliative Intent, Discussed with Patient

## 2019-08-19 ENCOUNTER — TELEPHONE (OUTPATIENT)
Dept: HEMATOLOGY/ONCOLOGY | Facility: CLINIC | Age: 54
End: 2019-08-19

## 2019-08-19 NOTE — TELEPHONE ENCOUNTER
----- Message from Corey Rodriguez sent at 8/19/2019  1:41 PM CDT -----  Contact: Patient  Reschedule existing appt      Appt date rescheduling:: 09/17  Is appt today or next day appt:: No  Type of appt :: md visit  Provider:: Tanja Chun  Reason for rescheduling:: out of town  Contact:: [phone]  Addition info:: Would like to come on 09/19 if avail.

## 2019-09-12 ENCOUNTER — TELEPHONE (OUTPATIENT)
Dept: HEMATOLOGY/ONCOLOGY | Facility: CLINIC | Age: 54
End: 2019-09-12

## 2019-09-12 NOTE — TELEPHONE ENCOUNTER
Message fwd to MD.     ----- Message from Corey Rodriguez sent at 9/12/2019  2:37 PM CDT -----  Contact: Patient  Staff Message     Caller name: Pt    Reason for call: Needs to speak with Dr Escobar, say that he's unable to refill his Rx ZYTIGA 500 mg Tab. Pharmacy is saying that they were instructed by the doctor not to refill this script.        Communication Preference: [phone]    Additional Information:

## 2019-09-12 NOTE — TELEPHONE ENCOUNTER
Call to pt and informed that pt progressed with rising PSA so Dr. Escobar wanted to d/c Zytiga/Prednisone as will be seeing if he is eligible for any trials/seeking new therapy.   Pt verbalized understanding and will stop taking rx.       ----- Message from Herlinda Escobar MD sent at 9/12/2019  3:15 PM CDT -----  Contact: Patient  He had progressed by PSA and was to be worked up for trial and Strata  They were supposed to request Path  Let me send Bethanie a note instead and see what trials he is eligible for     ----- Message -----  From: Izabella Mares  Sent: 9/12/2019   2:41 PM  To: Herlinda Escobar MD    Is he to continue Zytiga? Notes looked like may be going for clinical trial/IV chemo since rising PSA?    ----- Message -----  From: Corey Rodriguez  Sent: 9/12/2019   2:37 PM  To: Shawn BORJA Staff    Staff Message     Caller name: Pt    Reason for call: Needs to speak with Dr Escobar, say that he's unable to refill his Rx ZYTIGA 500 mg Tab. Pharmacy is saying that they were instructed by the doctor not to refill this script.        Communication Preference: [phone]    Additional Information:

## 2019-09-19 ENCOUNTER — TELEPHONE (OUTPATIENT)
Dept: HEMATOLOGY/ONCOLOGY | Facility: CLINIC | Age: 54
End: 2019-09-19

## 2019-09-19 NOTE — TELEPHONE ENCOUNTER
----- Message from Denisa Couch NP sent at 9/19/2019  6:53 AM CDT -----  Disregard message from last night. I will discuss with Shawn this morning to see if research is coming to talk to him. If not, then we can reschedule. Will let you know after meeting.   PJ

## 2019-09-19 NOTE — TELEPHONE ENCOUNTER
Called patient and spoke with him in regards to his appointment for today.   Explained to the patient that he is not needing to come in today and  would be calling him. He voiced appreciation and understanding

## 2019-09-25 ENCOUNTER — TELEPHONE (OUTPATIENT)
Dept: HEMATOLOGY/ONCOLOGY | Facility: CLINIC | Age: 54
End: 2019-09-25

## 2019-09-25 NOTE — TELEPHONE ENCOUNTER
Returned call to pt.  Pt stated was returning Dr. Escobar's call to discuss clinical trial options.   Informed pt would have Dr. Escobar return call.   Pt verbalized understanding.       Message fwd.       ----- Message from Neha Albrecht sent at 9/25/2019 12:51 PM CDT -----  Contact: Pt   Type:  Patient Returning Call    Who Called Pt   Who Left Message for Patient Dr Escobar   Does the patient know what this is regarding?:not sure   Would the patient rather a call back or a response via [facility]? Callback   Best Call Back Number:[phone]  Additional Information:   Thank You  CHANELLE Albrecht

## 2019-10-17 ENCOUNTER — TELEPHONE (OUTPATIENT)
Dept: HEMATOLOGY/ONCOLOGY | Facility: CLINIC | Age: 54
End: 2019-10-17

## 2019-10-17 NOTE — TELEPHONE ENCOUNTER
Message fwd to research nurse, Bethanie Schneider.       ----- Message from Corey Rodriguez sent at 10/17/2019  2:34 PM CDT -----  Contact: Patient  Staff Message     Caller name: Pt    Reason for call: Pt calling to follow up on the appt for the clinical trail program he discussed with Dr Escobar.        Communication Preference: [phone]    Additional Information:

## 2019-10-18 ENCOUNTER — TELEPHONE (OUTPATIENT)
Dept: HEMATOLOGY/ONCOLOGY | Facility: CLINIC | Age: 54
End: 2019-10-18

## 2019-10-18 NOTE — TELEPHONE ENCOUNTER
Message fwd to MD.         ----- Message from Corey Rodriguez sent at 10/18/2019  1:45 PM CDT -----  Contact: Krystin (wife)  Staff Message     Caller name: Krystin     Reason for call: Pt has questions regarding clinical trail, he's been waiting on a call from Dr Escobar. States that he's discontinued his cancer medication, and needs a status update.        Communication Preference: [phone]    Additional Information:

## 2019-10-21 NOTE — TELEPHONE ENCOUNTER
Appt scheduled.       ----- Message from Herlinda Escobar MD sent at 10/18/2019  5:12 PM CDT -----  Contact: Krystin (wife)  I need to see him again  Called and no answer  Message left  Please bring in Friday at 1:30    ----- Message -----  From: Izabella Mares  Sent: 10/18/2019   1:50 PM CDT  To: Herlinda Escobar MD    Sent message to Bethanie Schneider on 9/26 and yesterday to see when wants to bring in to discuss and haven't heard anything back?    ----- Message -----  From: Corey Rodriguez  Sent: 10/18/2019   1:45 PM CDT  To: Shawn BORJA Staff    Staff Message     Caller name: Krystin     Reason for call: Pt has questions regarding clinical trail, he's been waiting on a call from Dr Escobar. States that he's discontinued his cancer medication, and needs a status update.        Communication Preference: [phone]    Additional Information:

## 2019-11-01 NOTE — PROGRESS NOTES
Subjective:       Patient ID: Martin Alves is a 54 y.o. male.    Chief Complaint: No chief complaint on file.    HPI     No pain issues  Weight stable  No fevers, chills, or infections    Reported irregular bowel movements- 3--4 stools every AM  Metamucil has made more formed  Same urinary issues     Mr. Alves is a 54 y.o. male who returns for follow up and treatment of metastatic prostate cancer. He is tolerating the Zytiga and Prednisone well and without side effects.   We discussed that PSA continues to trend up     Lupron received with his urologist in       Most recent imagin19 bone scan:  Focal area of increased uptake at the level of the left lesser trochanter.  Metastatic disease is felt less likely, however cannot be completely excluded.  Attention on follow-up is recommended.     19 CT scans:  1. No definite evidence of metastatic disease in this patient with history of prostate cancer.  2. Several normal-sized periaortic and iliac chain nodes.  3. Several scattered subcentimeter hepatic hypodensities too small to characterize.  4. Right adrenal adenoma.     BMD 18:   NORMAL BMD OF THE HIP AND LUMBAR SPINE.  THE TBS T-SCORE L1-L4 IS -0.4.  RECOMMENDATIONS of [facility]:  1.  Calcium 1000 mg daily and vitamin D 600 units daily, adequate exercise.  2.  Keep prednisone dose to a minimum.  3.  Repeat BMD in 1-2 years depending on prednisone dose.            Oncologic History See below    Oncologic Treatment  Zytiga and Prednisone    Pathology  adenocarcinoma      Oncology History:  -  having difficulty urinating- urgency and frequency  Saw Dr. Monson  PSA= 4.6 ng/ml and placed on Flomax  Advised to recheck in 1 month and it was 3.9 ng/ml  - early  PSA-  = 7.8 ng/ml  -  prostate biopsy- told very aggressive W[place]=9  Placed on lupron q 3 months at that time (last 10/18, due again )  MRI prostate  CT scans- seen  "in LNs and base of bladder  Bone scan- negative  - told inoperable  - referred to Dr. Kirkland, medical oncology  - port placed 3/21/16  Initiated Taxotere q 3 weeks x 6 (started on 3/25/16)  - PSA dropped significantly   - plan was to rescan and check PSA after 3 cycles- delays with insurance so done instead after 2 chemotherapy treatments in 5/19/2016  Told "cancer 98% dead" Lns back to normal size  - 5/26/18 PSA= 0.01 ng/ml  - 5/27/18 3rd cycle of chemo began  - tolerated chemotherapy well - did receive neupogen x 3 days post  - completed 6 cycles 7/29/16  - was doing scans q 6 months and blood work every 3 months  - Dr. Hoskins left [facility] May 2017- notified by letter  - Dr. Costello-   - 8/17 scans and labs stable  - Dr. Sethi was assigned most recently as medical oncologist  - July 2018 PSA= 1.68 ng/ml  - 10/1518 CT scans and bone scan  - 10/22/18 saw Dr. Sethi  PSA 1.58 ng/ml  LNs increased slightly on scans  Bone scan negative  Advised him to start Zytiga, started 11/6/18 (with Prednisone)  - concerned about medication and side effects  Feels like he did well on chemotherapy  Concerned about MD turnover  Sent to a chemo class and questions why for this regimen and then got a bill for a co-pay  - came here for a second opinion     HM:  Colonoscopy neg 1 yr ago      Review of Systems   Constitutional: Negative for activity change, appetite change, chills, fatigue, fever and unexpected weight change.   HENT: Negative for dental problem, hearing loss, mouth sores, postnasal drip, rhinorrhea, sinus pressure, sore throat, tinnitus, trouble swallowing and voice change.    Eyes: Negative for visual disturbance.   Respiratory: Positive for cough (better). Negative for shortness of breath and wheezing.    Cardiovascular: Negative for chest pain, palpitations and leg swelling.   Gastrointestinal: Negative for abdominal distention, abdominal pain, blood in stool, constipation, diarrhea, nausea and vomiting.   Endocrine: " Negative for cold intolerance and heat intolerance.   Genitourinary: Negative for decreased urine volume, difficulty urinating, dysuria, frequency, hematuria and urgency.   Musculoskeletal: Negative for arthralgias, back pain, gait problem, joint swelling, myalgias, neck pain and neck stiffness.   Skin: Negative for color change, pallor, rash and wound.   Neurological: Negative for dizziness, weakness, light-headedness, numbness and headaches.   Hematological: Negative for adenopathy. Does not bruise/bleed easily.   Psychiatric/Behavioral: Negative for decreased concentration, dysphoric mood and sleep disturbance. The patient is not nervous/anxious.        Objective:      Physical Exam   Constitutional: He is oriented to person, place, and time. He appears well-developed and well-nourished. No distress.   Presents with his wife  ECOG=0   HENT:   Head: Normocephalic and atraumatic.   Eyes: Pupils are equal, round, and reactive to light. Conjunctivae and EOM are normal. Right eye exhibits no discharge. Left eye exhibits no discharge. No scleral icterus.   Neck: Normal range of motion. Neck supple. No tracheal deviation present. No thyromegaly present.   Cardiovascular: Normal rate, regular rhythm, normal heart sounds and intact distal pulses. Exam reveals no gallop and no friction rub.   No murmur heard.  Pulmonary/Chest: Effort normal and breath sounds normal. No respiratory distress. He has no wheezes. He has no rales. He exhibits no tenderness.   Abdominal: Soft. Bowel sounds are normal. He exhibits no distension and no mass. There is no tenderness. There is no rebound and no guarding.   No organomegaly   Musculoskeletal: Normal range of motion. He exhibits no edema, tenderness or deformity.   Lymphadenopathy:     He has no cervical adenopathy.   Neurological: He is alert and oriented to person, place, and time. He has normal reflexes. No cranial nerve deficit. He exhibits normal muscle tone. Coordination normal.    Skin: Skin is warm and dry. No rash noted. He is not diaphoretic. No erythema. No pallor.   Psychiatric: He has a normal mood and affect. His behavior is normal. Judgment and thought content normal.   Nursing note and vitals reviewed.      Assessment:       1. Prostate cancer metastatic to multiple sites        Plan:     repeat scans and see post    25 minute total visit

## 2019-11-05 NOTE — TELEPHONE ENCOUNTER
----- Message from Herlinda Escobar MD sent at 11/4/2019  4:47 PM CST -----  Scans asap  CT and bonse scan same days  Please do in AM and call patient as he needs to advise work

## 2019-11-05 NOTE — TELEPHONE ENCOUNTER
Called and spoke with patient and gave him his apt info for Monday for his bone scan and ct scan. Instructions on where to go and fasting given.

## 2019-11-20 NOTE — PROGRESS NOTES
Scans from 11/11/19  CT scan:    COMPARISON:  CT 08/07/2019; radiograph 08/09/2018; bone scan 11/11/2019    FINDINGS:  Examination of the vascular and soft tissue structures at the base of the neck is unremarkable.    A left sided aortic arch with 2 branch vessels is identified.  The thoracic aorta maintains normal caliber, contour, and course without significant atherosclerotic calcification within its course.    The heart is not enlarged.  The inferior vena cava drains into the coronary sinus and the superior vena cava drains into the right atrium.  Small volume pericardial fluid is identified.  The pulmonary arteries distribute normally and appear normal in caliber.  There are 4 pulmonary veins which drain into the left atrium.    The trachea is midline, the proximal airways are patent, and the lungs are symmetrically expanded.  A nodule is identified in the left upper lobe measuring 0.4 cm (axial series 2, image 42).  No consolidation, mass, pneumothorax, or pleural fluid.    Multiple prominent nodes measuring up to 0.9 cm without evidence of pathologic marni enlargement in the mediastinum or amy.    The esophagus maintains a normal course and caliber.    The liver is normal in size and attenuation.  Two stable hepatic cysts are identified as well as additional scattered subcentimeter hypodensities which are too small to characterize.  The gallbladder is unremarkable.  No intrahepatic or extrahepatic biliary ductal dilatation is noted.  The portal vein is patent.    The stomach, spleen, and left adrenal gland are unremarkable.  The right adrenal gland demonstrates a 2.0 cm nodule, stable in size when compared to CT 08/07/2019 and demonstrating attenuation greater than expected for an adenoma on today's study.  Multiple accessory splenules are identified.  The pancreas is located within the right hemiabdomen demonstrates no evidence of mass, peripancreatic inflammatory change, or ductal dilatation.    The  kidneys are normal in size and location.  Subcentimeter right renal hypodensity, too small to characterize.  No hydronephrosis is seen.  The ureters appear normal in course and caliber without evidence of ureteral dilatation. The urinary bladder is unremarkable. The prostate is small in size.    The visualized loops of small and large bowel show no evidence of obstruction or inflammation.  The appendix is normal.  No ascites, free fluid, or intraperitoneal free air is identified.  There has been interval enlargement right iliac chain periaortic lymph nodes.  Index right iliac chain lymph node now measures 1.0 cm (previously 0.6 cm) and index left periaortic lymph node now measures 0.8 cm (previously 0.5 cm).  Multiple prominent left iliac chain lymph nodes are again identified and appear similar to the most recent prior CT.  The abdominal aorta is normal in course and caliber with mild atherosclerotic calcifications.    The osseus structures demonstrate ill-defined sclerotic lesions are present within the T7 and L3 vertebral bodies as well as the left lesser trochanter.  No acute fracture.    Bilateral fat containing inguinal hernias are identified.      Impression       1. In this patient with a history of prostate cancer, there are findings consistent with worsening metastatic disease including interval enlargement of right iliac chain and periaortic lymph nodes and new osseous sclerotic lesions.  2. Hypodense right adrenal nodule which is stable size compared to CT 08/07/2019 when it was characterized as an adenoma noting density is higher than expected for an adenoma on today's study.  3. Stable hepatic cysts and additional stable subcentimeter hypodensities which are too small to characterize but likely also cysts.  4. Right middle lobe nodule measuring 0.4 cm.  This can be followed with routine oncologic surveillance.  5. Additional findings as above.     Bone scan:  COMPARISON:  Nuclear bone scan 08/07/2019.   CT abdomen pelvis 08/07/2019.    FINDINGS:  Focal area of increased uptake at the level of the left lesser trochanter which appears larger compared to prior bone scan dated 08/07/2019.    New abnormal foci of increased uptake involving T7 and L3 vertebral bodies and the left lateral 7th rib.  Findings are concerning for metastatic disease.    There is normal uptake in the genitourinary system and soft tissues.      Impression       Scintigraphic evidence of progressive osseous metastatic disease.     Discussed results  No current available trials after screenings done as closed  New trials pending  With history of Taxotere response will re-initiate at weekly  Reviewed rationale  25 minutes total

## 2019-12-02 NOTE — PLAN OF CARE
Patient tolerated C1D1 of taxotere well today. [facility]. Patient would like to know for next treatment if he could NOT receive benadryl prior d/t not liking the feeling and did not receive it at  when getting taxotere. Re-educated on importance of frequent handwashing and monitoring for temp of 100.4 or >. Patient declined avs, uses [facility]. PIV removed, catheter tip intact. Discharged home, ambulated independently with wife by side.

## 2019-12-02 NOTE — PLAN OF CARE
Problem: Adult Inpatient Plan of Care  Goal: Optimal Comfort and Wellbeing  Intervention: Provide Person-Centered Care  Flowsheets (Taken 12/2/2019 6701)  Trust Relationship/Rapport: care explained; questions encouraged; reassurance provided; choices provided; emotional support provided; empathic listening provided; questions answered; thoughts/feelings acknowledged

## 2019-12-03 NOTE — TELEPHONE ENCOUNTER
Received call from pt wife to nurse ext from phone staff.   Pt wife stated that pt had first Taxotere infusion yesterday and pt's face is red for last couple of hours and has a cold rag on his head.   Pt wife believes is a reaction to benadryl given yesterday.   S/w NP, Tanja Couch, who stated that most likely reaction to IV steroids given yesterday with pre-meds (given pt flushed and warm and pt denying any fevers).   Informed pt wife to monitor symptoms and if any worsening/no improvement/swelling of throat/hard to breathe to seek ED care immediately.   Pt wife verbalized understanding.

## 2019-12-09 PROBLEM — T45.1X5A ANTINEOPLASTIC CHEMOTHERAPY INDUCED ANEMIA: Status: ACTIVE | Noted: 2019-01-01

## 2019-12-09 PROBLEM — C79.51 BONE METASTASES: Status: ACTIVE | Noted: 2019-01-01

## 2019-12-09 PROBLEM — G89.3 NEOPLASM RELATED PAIN: Status: ACTIVE | Noted: 2019-01-01

## 2019-12-09 PROBLEM — D64.81 ANTINEOPLASTIC CHEMOTHERAPY INDUCED ANEMIA: Status: ACTIVE | Noted: 2019-01-01

## 2019-12-09 NOTE — PROGRESS NOTES
"PATIENT: Martin Alves  MRN: [id number]  DATE: 12/9/2019      Chief complaint:  Chief Complaint   Patient presents with    Prostate cancer metastatic to intrapelvic lymph node         History of Present Illness: Mr. Alves is a 54 y.o. who returns in follow up for Metastatic Prostate Cancer.  Recent progression on scans and rising PSA.   No current available trials after screenings done as closed. New trials pending  With history of Taxotere response will re-initiate at weekly- C1 12/2/19.    Today, He feels good.   Tolerated treatment well but developed restless legs during treatment and reported facial flushing 1 day post.   No new complaints.   Usual left hip pain- no worse.   Urinating ok.   Feels good today.     He is accompanied by his spouse.    Oncologic History:  - 2015 having difficulty urinating- urgency and frequency  Saw Dr. Monson  PSA= 4.6 ng/ml and placed on Flomax  Advised to recheck in 1 month and it was 3.9 ng/ml  - early 2016 PSA- 2/16 = 7.8 ng/ml  - 2/16 prostate biopsy- told very aggressive Cash=9  Placed on lupron q 3 months at that time (last 10/18, due again 1/19)  MRI prostate  CT scans- seen in LNs and base of bladder  Bone scan- negative  - told inoperable  - referred to Dr. Kirkland, medical oncology  - port placed 3/21/16  Initiated Taxotere q 3 weeks x 6 (started on 3/25/16)  - PSA dropped significantly   - plan was to rescan and check PSA after 3 cycles- delays with insurance so done instead after 2 chemotherapy treatments in 5/19/2016  Told "cancer 98% dead" Lns back to normal size  - 5/26/18 PSA= 0.01 ng/ml  - 5/27/18 3rd cycle of chemo began  - tolerated chemotherapy well - did receive neupogen x 3 days post  - completed 6 cycles 7/29/16  - was doing scans q 6 months and blood work every 3 months  - Dr. Hoskins left [facility] May 2017- notified by letter  - Dr. Costello-   - 8/17 scans and labs stable  - Dr. Sethi was assigned most recently as medical oncologist  - July 2018 " "PSA= 1.68 ng/ml  - 10/1518 CT scans and bone scan  - 10/22/18 saw Dr. Sethi  PSA 1.58 ng/ml  LNs increased slightly on scans  Bone scan negative  Advised him to start Zytiga, started 11/6/18 (with Prednisone)  - concerned about medication and side effects  Feels like he did well on chemotherapy  Concerned about MD turnover  Sent to a chemo class and questions why for this regimen and then got a bill for a co-pay  - came here for a second opinion     - started Zytiga and Prednisone.   -progression noted recently and taxotere re-initiated.       HM:  Colonoscopy neg 1 yr ago       PMFSH: all information reviewed and updated as relevant to today's visit    Review of Systems:   Review of Systems   Constitutional: Negative for activity change, appetite change, fatigue and fever.   HENT: Negative for mouth sores, nosebleeds and sore throat.    Eyes: Negative for visual disturbance.   Respiratory: Negative for cough and shortness of breath.    Cardiovascular: Negative for chest pain, palpitations and leg swelling.   Gastrointestinal: Negative for abdominal pain, constipation, diarrhea, nausea and vomiting.   Genitourinary: Negative for difficulty urinating and frequency.   Musculoskeletal: Positive for arthralgias (left hip- no worse than usual). Negative for back pain and joint swelling.   Skin: Negative for rash.   Neurological: Negative for dizziness, numbness and headaches.   Hematological: Negative for adenopathy. Does not bruise/bleed easily.   Psychiatric/Behavioral: Negative for confusion and sleep disturbance. The patient is not nervous/anxious.    All other systems reviewed and are negative.        Objective:      Vitals:   Vitals:    12/09/19 1511   BP: 126/88   BP Location: Left arm   Patient Position: Sitting   BP Method: Large (Automatic)   Pulse: 77   Resp: 18   Temp: 98.9 °F (37.2 °C)   TempSrc: Oral   SpO2: 96%   Weight: 86.1 kg (189 lb 13.1 oz)   Height: 5' 11" (1.803 m)     BMI: Body mass index is 26.47 " kg/m².    Physical Exam:   Physical Exam   Constitutional: He is oriented to person, place, and time. He appears well-developed and well-nourished. No distress.   HENT:   Head: Normocephalic and atraumatic.   Nose: Nose normal.   Mouth/Throat: Oropharynx is clear and moist.   Eyes: Pupils are equal, round, and reactive to light. Conjunctivae and lids are normal. No scleral icterus.   Neck: Trachea normal and normal range of motion. Neck supple. No JVD present. No thyromegaly present.   Cardiovascular: Normal rate, regular rhythm, normal heart sounds, intact distal pulses and normal pulses.   No murmur heard.  Pulmonary/Chest: Effort normal and breath sounds normal. He has no wheezes.   Abdominal: Soft. Normal appearance and bowel sounds are normal. He exhibits no distension and no mass. There is no hepatosplenomegaly or splenomegaly. There is no tenderness.   No organomegaly.    Musculoskeletal: Normal range of motion. He exhibits no edema.   No spinal or paraspinal tenderness.    Lymphadenopathy:        Head (right side): No submental and no submandibular adenopathy present.        Head (left side): No submental and no submandibular adenopathy present.     He has no cervical adenopathy.     He has no axillary adenopathy.        Right: No supraclavicular adenopathy present.        Left: No supraclavicular adenopathy present.   Neurological: He is alert and oriented to person, place, and time. He has normal reflexes. He displays normal reflexes. No sensory deficit. Coordination normal.   Skin: Skin is warm, dry and intact. Capillary refill takes less than 2 seconds. No bruising and no rash noted. Nails show no clubbing.   Psychiatric: He has a normal mood and affect. His speech is normal and behavior is normal. Cognition and memory are normal.   Nursing note and vitals reviewed.        ECOG Performance Status:   ECOG SCORE    0 - Fully active-able to carry on all pre-disease performance without restriction          Laboratory Data:  Results for orders placed or performed in visit on 12/09/19   CBC W/ AUTO DIFFERENTIAL   Result Value Ref Range    WBC 9.73 3.90 - 12.70 K/uL    RBC 4.62 4.60 - 6.20 M/uL    Hemoglobin 12.6 (L) 14.0 - 18.0 g/dL    Hematocrit 38.8 (L) 40.0 - 54.0 %    Mean Corpuscular Volume 84 82 - 98 fL    Mean Corpuscular Hemoglobin 27.3 27.0 - 31.0 pg    Mean Corpuscular Hemoglobin Conc 32.5 32.0 - 36.0 g/dL    RDW 14.2 11.5 - 14.5 %    Platelets 249 150 - 350 K/uL    MPV 10.0 9.2 - 12.9 fL    Immature Granulocytes 0.7 (H) 0.0 - 0.5 %    Gran # (ANC) 6.2 1.8 - 7.7 K/uL    Immature Grans (Abs) 0.07 (H) 0.00 - 0.04 K/uL    Lymph # 2.6 1.0 - 4.8 K/uL    Mono # 0.7 0.3 - 1.0 K/uL    Eos # 0.2 0.0 - 0.5 K/uL    Baso # 0.05 0.00 - 0.20 K/uL    nRBC 0 0 /100 WBC    Gran% 63.5 38.0 - 73.0 %    Lymph% 26.4 18.0 - 48.0 %    Mono% 6.7 4.0 - 15.0 %    Eosinophil% 2.2 0.0 - 8.0 %    Basophil% 0.5 0.0 - 1.9 %    Differential Method Automated    Comprehensive metabolic panel   Result Value Ref Range    Sodium 143 136 - 145 mmol/L    Potassium 4.2 3.5 - 5.1 mmol/L    Chloride 109 95 - 110 mmol/L    CO2 27 23 - 29 mmol/L    Glucose 92 70 - 110 mg/dL    BUN, Bld 12 6 - 20 mg/dL    Creatinine 0.9 0.5 - 1.4 mg/dL    Calcium 9.3 8.7 - 10.5 mg/dL    Total Protein 6.5 6.0 - 8.4 g/dL    Albumin 3.8 3.5 - 5.2 g/dL    Total Bilirubin 0.3 0.1 - 1.0 mg/dL    Alkaline Phosphatase 182 (H) 55 - 135 U/L    AST 21 10 - 40 U/L    ALT 21 10 - 44 U/L    Anion Gap 7 (L) 8 - 16 mmol/L    eGFR if African American >60.0 >60 mL/min/1.73 m^2    eGFR if non African American >60.0 >60 mL/min/1.73 m^2        Imaging:  CT Chest Abdomen Pelvis With Contrast  Narrative: EXAMINATION:  CT CHEST ABDOMEN PELVIS WITH CONTRAST (XPD)    CLINICAL HISTORY:  met prostate cancer;  Malignant neoplasm of prostate    TECHNIQUE:  The patient was surveyed from the lung apices through the pelvis after the administration of 100 cc Omni 350 IV contrast as well as oral  contrast and data was reconstructed for coronal, sagittal, and axial images.    COMPARISON:  CT 08/07/2019; radiograph 08/09/2018; bone scan 11/11/2019    FINDINGS:  Examination of the vascular and soft tissue structures at the base of the neck is unremarkable.    A left sided aortic arch with 2 branch vessels is identified.  The thoracic aorta maintains normal caliber, contour, and course without significant atherosclerotic calcification within its course.    The heart is not enlarged.  The inferior vena cava drains into the coronary sinus and the superior vena cava drains into the right atrium.  Small volume pericardial fluid is identified.  The pulmonary arteries distribute normally and appear normal in caliber.  There are 4 pulmonary veins which drain into the left atrium.    The trachea is midline, the proximal airways are patent, and the lungs are symmetrically expanded.  A nodule is identified in the left upper lobe measuring 0.4 cm (axial series 2, image 42).  No consolidation, mass, pneumothorax, or pleural fluid.    Multiple prominent nodes measuring up to 0.9 cm without evidence of pathologic marni enlargement in the mediastinum or amy.    The esophagus maintains a normal course and caliber.    The liver is normal in size and attenuation.  Two stable hepatic cysts are identified as well as additional scattered subcentimeter hypodensities which are too small to characterize.  The gallbladder is unremarkable.  No intrahepatic or extrahepatic biliary ductal dilatation is noted.  The portal vein is patent.    The stomach, spleen, and left adrenal gland are unremarkable.  The right adrenal gland demonstrates a 2.0 cm nodule, stable in size when compared to CT 08/07/2019 and demonstrating attenuation greater than expected for an adenoma on today's study.  Multiple accessory splenules are identified.  The pancreas is located within the right hemiabdomen demonstrates no evidence of mass, peripancreatic  inflammatory change, or ductal dilatation.    The kidneys are normal in size and location.  Subcentimeter right renal hypodensity, too small to characterize.  No hydronephrosis is seen.  The ureters appear normal in course and caliber without evidence of ureteral dilatation. The urinary bladder is unremarkable. The prostate is small in size.    The visualized loops of small and large bowel show no evidence of obstruction or inflammation.  The appendix is normal.  No ascites, free fluid, or intraperitoneal free air is identified.  There has been interval enlargement right iliac chain periaortic lymph nodes.  Index right iliac chain lymph node now measures 1.0 cm (previously 0.6 cm) and index left periaortic lymph node now measures 0.8 cm (previously 0.5 cm).  Multiple prominent left iliac chain lymph nodes are again identified and appear similar to the most recent prior CT.  The abdominal aorta is normal in course and caliber with mild atherosclerotic calcifications.    The osseus structures demonstrate ill-defined sclerotic lesions are present within the T7 and L3 vertebral bodies as well as the left lesser trochanter.  No acute fracture.    Bilateral fat containing inguinal hernias are identified.  Impression: 1. In this patient with a history of prostate cancer, there are findings consistent with worsening metastatic disease including interval enlargement of right iliac chain and periaortic lymph nodes and new osseous sclerotic lesions.  2. Hypodense right adrenal nodule which is stable size compared to CT 08/07/2019 when it was characterized as an adenoma noting density is higher than expected for an adenoma on today's study.  3. Stable hepatic cysts and additional stable subcentimeter hypodensities which are too small to characterize but likely also cysts.  4. Right middle lobe nodule measuring 0.4 cm.  This can be followed with routine oncologic surveillance.  5. Additional findings as above.    Electronically  signed by resident: Blaine Newton  Date:    11/11/2019  Time:    14:26    Electronically signed by: Lluvia Cason MD  Date:    11/11/2019  Time:    17:07  NM Bone Scan Whole Body  Narrative: EXAMINATION:  NM BONE SCAN WHOLE BODY    CLINICAL HISTORY:  met prostate cancer;  Malignant neoplasm of prostate    TECHNIQUE:  Approximately 2-3 hours following the IV administration of 22 mCi of Tc-99m-MDP, anterior and posterior delayed whole body images were acquired.  Lateral spot images of the skull were also acquired.    COMPARISON:  Nuclear bone scan 08/07/2019.  CT abdomen pelvis 08/07/2019.    FINDINGS:  Focal area of increased uptake at the level of the left lesser trochanter which appears larger compared to prior bone scan dated 08/07/2019.    New abnormal foci of increased uptake involving T7 and L3 vertebral bodies and the left lateral 7th rib.  Findings are concerning for metastatic disease.    There is normal uptake in the genitourinary system and soft tissues.  Impression: Scintigraphic evidence of progressive osseous metastatic disease.    I, Kuldip Rodriguez MD, attest that I reviewed and interpreted the images.    Electronically signed by resident: Mary Grace Pena MD  Date:    11/11/2019  Time:    13:49    Electronically signed by: Kuldip Rodriguez  Date:    11/11/2019  Time:    15:35     BMD 12/18/18:   NORMAL BMD OF THE HIP AND LUMBAR SPINE.  THE TBS T-SCORE L1-L4 IS -0.4.  RECOMMENDATIONS of [facility]:  1.  Calcium 1000 mg daily and vitamin D 600 units daily, adequate exercise.  2.  Keep prednisone dose to a minimum.  3.  Repeat BMD in 1-2 years depending on prednisone dose.          Assessment/Plan:     1. 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 Bone metastases    3. Antineoplastic chemotherapy induced anemia    4. Neoplasm related pain        Plan:  -Doing well, clinically stable. No new pain issues.   -Labs reviewed. Anemia parameters noted. Monitor alk  phosphatase.   -Proceed with C2 weekly Taxotere. Will change benadryl to po due to restless legs. Unable to reduce dexamethasone at this time- will monitor for flushing.    -RTC in 1 week to see me with cbc, cmp and for C3 Taxotere.   -Repeat PSA after C3.       Med and Orders:  Orders Placed This Encounter    CBC Oncology    Comprehensive metabolic panel       Follow Up:  Follow up in about 1 week (around 12/16/2019).      Patient is in agreement with the proposed treatment plan. All questions were answered to the patient's satisfaction. Pt knows to call clinic for any new or worsening symptoms and if anything is needed before the next clinic visit. Discussed case with Dr. Escobar who agrees with above.      Denisa Couch, JOSELYNP-C  Hematology & Oncology  [facility] [place] [address]  ph. [phone]  Fax. [phone]     I spent 30 minutes (face to face) with the patient, more than 50% was in counseling and coordination of care as detailed above.

## 2019-12-09 NOTE — PLAN OF CARE
C2 taxotere tolerated well, no adverse effects noted. Benadryl tablet ordered in place of IVPB with no issues noted. PIV d/c'd cath tip intact. Site covered no redness swelling present. Vitals stable throughout infusion. Avs printed and given to pt. Instructed to contact providers clinic with questions or concerns in the interim. Leaves clinic ambulatory in stable condition accompanied by wife.

## 2019-12-11 NOTE — PROGRESS NOTES
"PATIENT: Martin Alves  MRN: [id number]  DATE: 12/17/2019      Chief complaint:  Chief Complaint   Patient presents with    Prostate cancer metastatic to intrapelvic lymph node         History of Present Illness: Mr. Alves is a 54 y.o. who returns in follow up for Metastatic Prostate Cancer.  Recent progression on scans and rising PSA.   No current available trials after screenings done as closed. New trials pending  With history of Taxotere response will re-initiate at weekly- C1 12/2/19.    Today, he presents for C3 Taxotere.   Tolerated last treatment well. No restless legs with oral benadryl.   Less flushing as well.   Returned from recent hunting trip in [place]. Mild fatigue.   No new complaints.   Usual left hip pain- no worse.   Urinating ok.   Feels good today.     He is accompanied by his self.    Oncologic History:  - 2015 having difficulty urinating- urgency and frequency  Saw Dr. Monson  PSA= 4.6 ng/ml and placed on Flomax  Advised to recheck in 1 month and it was 3.9 ng/ml  - early 2016 PSA- 2/16 = 7.8 ng/ml  - 2/16 prostate biopsy- told very aggressive [place]=9  Placed on lupron q 3 months at that time (last 10/18, due again 1/19)  MRI prostate  CT scans- seen in LNs and base of bladder  Bone scan- negative  - told inoperable  - referred to Dr. Kirkland, medical oncology  - port placed 3/21/16  Initiated Taxotere q 3 weeks x 6 (started on 3/25/16)  - PSA dropped significantly   - plan was to rescan and check PSA after 3 cycles- delays with insurance so done instead after 2 chemotherapy treatments in 5/19/2016  Told "cancer 98% dead" Lns back to normal size  - 5/26/18 PSA= 0.01 ng/ml  - 5/27/18 3rd cycle of chemo began  - tolerated chemotherapy well - did receive neupogen x 3 days post  - completed 6 cycles 7/29/16  - was doing scans q 6 months and blood work every 3 months  - Dr. Hoskins left [facility] May 2017- notified by letter  - Dr. Costello-   - 8/17 scans and labs stable  - Dr. Sethi was " assigned most recently as medical oncologist  - July 2018 PSA= 1.68 ng/ml  - 10/1518 CT scans and bone scan  - 10/22/18 saw Dr. Sethi  PSA 1.58 ng/ml  LNs increased slightly on scans  Bone scan negative  Advised him to start Zytiga, started 11/6/18 (with Prednisone)  - concerned about medication and side effects  Feels like he did well on chemotherapy  Concerned about MD turnover  Sent to a chemo class and questions why for this regimen and then got a bill for a co-pay  - came here for a second opinion     - started Zytiga and Prednisone.   -progression noted recently and taxotere re-initiated.       HM:  Colonoscopy neg 1 yr ago       PMFSH: all information reviewed and updated as relevant to today's visit    Review of Systems:   Review of Systems   Constitutional: Positive for fatigue (mild). Negative for activity change, appetite change and fever.   HENT: Negative for mouth sores, nosebleeds and sore throat.    Eyes: Negative for visual disturbance.   Respiratory: Negative for cough and shortness of breath.    Cardiovascular: Negative for chest pain, palpitations and leg swelling.   Gastrointestinal: Negative for abdominal pain, constipation, diarrhea, nausea and vomiting.   Genitourinary: Negative for difficulty urinating and frequency.   Musculoskeletal: Positive for arthralgias (left hip- no worse than usual). Negative for back pain and joint swelling.   Skin: Negative for rash.   Neurological: Negative for dizziness, numbness and headaches.   Hematological: Negative for adenopathy. Does not bruise/bleed easily.   Psychiatric/Behavioral: Negative for confusion and sleep disturbance. The patient is not nervous/anxious.    All other systems reviewed and are negative.        Objective:      Vitals:   Vitals:    12/17/19 0735   BP: 122/83   BP Location: Left arm   Patient Position: Sitting   BP Method: Large (Automatic)   Pulse: 91   Resp: 18   Temp: 98.5 °F (36.9 °C)   TempSrc: Oral   SpO2: 95%   Weight: 86.2 kg  "(190 lb 0.6 oz)   Height: 5' 11" (1.803 m)     BMI: Body mass index is 26.5 kg/m².    Physical Exam:   Physical Exam   Constitutional: He is oriented to person, place, and time. He appears well-developed and well-nourished. No distress.   HENT:   Head: Normocephalic and atraumatic.   Nose: Nose normal.   Mouth/Throat: Oropharynx is clear and moist.   Eyes: Pupils are equal, round, and reactive to light. Conjunctivae and lids are normal. No scleral icterus.   Neck: Trachea normal and normal range of motion. Neck supple. No JVD present. No thyromegaly present.   Cardiovascular: Normal rate, regular rhythm, normal heart sounds, intact distal pulses and normal pulses.   No murmur heard.  Pulmonary/Chest: Effort normal and breath sounds normal. He has no wheezes.   Abdominal: Soft. Normal appearance and bowel sounds are normal. He exhibits no distension and no mass. There is no hepatosplenomegaly or splenomegaly. There is no tenderness.   No organomegaly.    Musculoskeletal: Normal range of motion. He exhibits no edema.   No spinal or paraspinal tenderness.    Lymphadenopathy:        Head (right side): No submental and no submandibular adenopathy present.        Head (left side): No submental and no submandibular adenopathy present.     He has no cervical adenopathy.     He has no axillary adenopathy.        Right: No supraclavicular adenopathy present.        Left: No supraclavicular adenopathy present.   Neurological: He is alert and oriented to person, place, and time. He has normal reflexes. He displays normal reflexes. No sensory deficit. Coordination normal.   Skin: Skin is warm, dry and intact. Capillary refill takes less than 2 seconds. No bruising and no rash noted. Nails show no clubbing.   Psychiatric: He has a normal mood and affect. His speech is normal and behavior is normal. Cognition and memory are normal.   Nursing note and vitals reviewed.        ECOG Performance Status:   ECOG SCORE    0 - Fully " active-able to carry on all pre-disease performance without restriction         Laboratory Data:  Results for orders placed or performed in visit on 12/16/19   CBC Oncology   Result Value Ref Range    WBC 8.20 3.90 - 12.70 K/uL    RBC 5.06 4.60 - 6.20 M/uL    Hemoglobin 13.7 (L) 14.0 - 18.0 g/dL    Hematocrit 43.3 40.0 - 54.0 %    Mean Corpuscular Volume 86 82 - 98 fL    Mean Corpuscular Hemoglobin 27.1 27.0 - 31.0 pg    Mean Corpuscular Hemoglobin Conc 31.6 (L) 32.0 - 36.0 g/dL    RDW 14.8 (H) 11.5 - 14.5 %    Platelets 252 150 - 350 K/uL    MPV 10.1 9.2 - 12.9 fL    Gran # (ANC) 5.5 1.8 - 7.7 K/uL    Immature Grans (Abs) 0.04 0.00 - 0.04 K/uL   Comprehensive metabolic panel   Result Value Ref Range    Sodium 138 136 - 145 mmol/L    Potassium 4.2 3.5 - 5.1 mmol/L    Chloride 105 95 - 110 mmol/L    CO2 25 23 - 29 mmol/L    Glucose 101 70 - 110 mg/dL    BUN, Bld 12 6 - 20 mg/dL    Creatinine 0.9 0.5 - 1.4 mg/dL    Calcium 9.3 8.7 - 10.5 mg/dL    Total Protein 6.6 6.0 - 8.4 g/dL    Albumin 3.9 3.5 - 5.2 g/dL    Total Bilirubin 0.4 0.1 - 1.0 mg/dL    Alkaline Phosphatase 155 (H) 55 - 135 U/L    AST 19 10 - 40 U/L    ALT 20 10 - 44 U/L    Anion Gap 8 8 - 16 mmol/L    eGFR if African American >60.0 >60 mL/min/1.73 m^2    eGFR if non African American >60.0 >60 mL/min/1.73 m^2        Imaging:  CT Chest Abdomen Pelvis With Contrast  Narrative: EXAMINATION:  CT CHEST ABDOMEN PELVIS WITH CONTRAST (XPD)    CLINICAL HISTORY:  met prostate cancer;  Malignant neoplasm of prostate    TECHNIQUE:  The patient was surveyed from the lung apices through the pelvis after the administration of 100 cc Omni 350 IV contrast as well as oral contrast and data was reconstructed for coronal, sagittal, and axial images.    COMPARISON:  CT 08/07/2019; radiograph 08/09/2018; bone scan 11/11/2019    FINDINGS:  Examination of the vascular and soft tissue structures at the base of the neck is unremarkable.    A left sided aortic arch with 2 branch  vessels is identified.  The thoracic aorta maintains normal caliber, contour, and course without significant atherosclerotic calcification within its course.    The heart is not enlarged.  The inferior vena cava drains into the coronary sinus and the superior vena cava drains into the right atrium.  Small volume pericardial fluid is identified.  The pulmonary arteries distribute normally and appear normal in caliber.  There are 4 pulmonary veins which drain into the left atrium.    The trachea is midline, the proximal airways are patent, and the lungs are symmetrically expanded.  A nodule is identified in the left upper lobe measuring 0.4 cm (axial series 2, image 42).  No consolidation, mass, pneumothorax, or pleural fluid.    Multiple prominent nodes measuring up to 0.9 cm without evidence of pathologic marni enlargement in the mediastinum or amy.    The esophagus maintains a normal course and caliber.    The liver is normal in size and attenuation.  Two stable hepatic cysts are identified as well as additional scattered subcentimeter hypodensities which are too small to characterize.  The gallbladder is unremarkable.  No intrahepatic or extrahepatic biliary ductal dilatation is noted.  The portal vein is patent.    The stomach, spleen, and left adrenal gland are unremarkable.  The right adrenal gland demonstrates a 2.0 cm nodule, stable in size when compared to CT 08/07/2019 and demonstrating attenuation greater than expected for an adenoma on today's study.  Multiple accessory splenules are identified.  The pancreas is located within the right hemiabdomen demonstrates no evidence of mass, peripancreatic inflammatory change, or ductal dilatation.    The kidneys are normal in size and location.  Subcentimeter right renal hypodensity, too small to characterize.  No hydronephrosis is seen.  The ureters appear normal in course and caliber without evidence of ureteral dilatation. The urinary bladder is unremarkable.  The prostate is small in size.    The visualized loops of small and large bowel show no evidence of obstruction or inflammation.  The appendix is normal.  No ascites, free fluid, or intraperitoneal free air is identified.  There has been interval enlargement right iliac chain periaortic lymph nodes.  Index right iliac chain lymph node now measures 1.0 cm (previously 0.6 cm) and index left periaortic lymph node now measures 0.8 cm (previously 0.5 cm).  Multiple prominent left iliac chain lymph nodes are again identified and appear similar to the most recent prior CT.  The abdominal aorta is normal in course and caliber with mild atherosclerotic calcifications.    The osseus structures demonstrate ill-defined sclerotic lesions are present within the T7 and L3 vertebral bodies as well as the left lesser trochanter.  No acute fracture.    Bilateral fat containing inguinal hernias are identified.  Impression: 1. In this patient with a history of prostate cancer, there are findings consistent with worsening metastatic disease including interval enlargement of right iliac chain and periaortic lymph nodes and new osseous sclerotic lesions.  2. Hypodense right adrenal nodule which is stable size compared to CT 08/07/2019 when it was characterized as an adenoma noting density is higher than expected for an adenoma on today's study.  3. Stable hepatic cysts and additional stable subcentimeter hypodensities which are too small to characterize but likely also cysts.  4. Right middle lobe nodule measuring 0.4 cm.  This can be followed with routine oncologic surveillance.  5. Additional findings as above.    Electronically signed by resident: Blaine Newton  Date:    11/11/2019  Time:    14:26    Electronically signed by: Lluvia Cason MD  Date:    11/11/2019  Time:    17:07  NM Bone Scan Whole Body  Narrative: EXAMINATION:  NM BONE SCAN WHOLE BODY    CLINICAL HISTORY:  met prostate cancer;  Malignant neoplasm of  prostate    TECHNIQUE:  Approximately 2-3 hours following the IV administration of 22 mCi of Tc-99m-MDP, anterior and posterior delayed whole body images were acquired.  Lateral spot images of the skull were also acquired.    COMPARISON:  Nuclear bone scan 08/07/2019.  CT abdomen pelvis 08/07/2019.    FINDINGS:  Focal area of increased uptake at the level of the left lesser trochanter which appears larger compared to prior bone scan dated 08/07/2019.    New abnormal foci of increased uptake involving T7 and L3 vertebral bodies and the left lateral 7th rib.  Findings are concerning for metastatic disease.    There is normal uptake in the genitourinary system and soft tissues.  Impression: Scintigraphic evidence of progressive osseous metastatic disease.    I, Kuldip Rodriguez MD, attest that I reviewed and interpreted the images.    Electronically signed by resident: Mary Grace Pena MD  Date:    11/11/2019  Time:    13:49    Electronically signed by: Kuldip Rodriguez  Date:    11/11/2019  Time:    15:35     BMD 12/18/18:   NORMAL BMD OF THE HIP AND LUMBAR SPINE.  THE TBS T-SCORE L1-L4 IS -0.4.  RECOMMENDATIONS of [facility]:  1.  Calcium 1000 mg daily and vitamin D 600 units daily, adequate exercise.  2.  Keep prednisone dose to a minimum.  3.  Repeat BMD in 1-2 years depending on prednisone dose.          Assessment/Plan:     1. 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 Bone metastases    3. Antineoplastic chemotherapy induced anemia    4. Neoplasm related pain        Plan:      -Doing well, clinically stable. No new pain issues.   -Labs reviewed. Anemia parameters noted. Alk phosphatase- slightly down.   -Proceed with C3 weekly Taxotere.Continue with oral benadryl as developed restless legs with IV.   -RTC in 1 week to see me  with cbc, cmp , and for C4 Taxotere. Labs at 10am on 12/23/19. See me 730 am 12/24/19 with treatment after.   -RTC in 2 weeks to see Dr. Escobar with  CBC, CMp, PSA and C5 Taxotere.         Med and Orders:  Orders Placed This Encounter    CBC Oncology    Comprehensive metabolic panel    Prostate Specific Antigen, Diagnostic       Follow Up:  Follow up in about 1 week (around 12/24/2019).

## 2019-12-17 NOTE — PLAN OF CARE
1032  Infusion completed, pt tolerated well; pt instructed to remain well hydrated; discussed when to contact MD, when   to report to ER; declined AVS, verbalized understanding of all discussed and when to report next

## 2019-12-17 NOTE — Clinical Note
-RTC in 1 week to see me  with cbc, cmp , and for C4 Taxotere. Please do Labs at 10am on 12/23/19. See me 730 am 12/24/19 with treatment after. -RTC in 2 weeks to see Dr. Ecsobar with CBC, CMp, PSA and C5 Taxotere.

## 2019-12-17 NOTE — NURSING
0810  Pt here for Taxotere infusion, no new complaints or concerns at present, reports tolerating treatment; pt reports he does not take oral prednisone 5mg twice daily per instructions from MD Escobar; discussed treatment plan for today, all questions answered and pt agrees to proceed

## 2019-12-31 NOTE — PROGRESS NOTES
"Subjective:       Patient ID: Martin Alves is a 54 y.o. male.    Chief Complaint: No chief complaint on file.    HPI     Mr. Alves is a 54 y.o. who returns in follow up for Metastatic Prostate Cancer.  Recent progression on scans and rising PSA.   No current available trials after screenings done as closed. New trials pending  With history of Taxotere response will re-initiate at weekly- C1 12/2/19.     Today, he presents for C4 Taxotere.   Notes some numbness in great toe bilateral feet is a little worse  Tolerated last treatment well. No restless legs with oral benadryl.   Less flushing as well.   Returned from recent hunting trip in [place]. Mild fatigue.   No new complaints.   Usual left hip pain- no worse.   Urinating ok.   Feels good today.      He is accompanied by his self.     Oncologic History:  - 2015 having difficulty urinating- urgency and frequency  Saw Dr. Monson  PSA= 4.6 ng/ml and placed on Flomax  Advised to recheck in 1 month and it was 3.9 ng/ml  - early 2016 PSA- 2/16 = 7.8 ng/ml  - 2/16 prostate biopsy- told very aggressive [place]=9  Placed on lupron q 3 months at that time (last 10/18, due again 1/19)  MRI prostate  CT scans- seen in LNs and base of bladder  Bone scan- negative  - told inoperable  - referred to Dr. Kirkland, medical oncology  - port placed 3/21/16  Initiated Taxotere q 3 weeks x 6 (started on 3/25/16)  - PSA dropped significantly   - plan was to rescan and check PSA after 3 cycles- delays with insurance so done instead after 2 chemotherapy treatments in 5/19/2016  Told "cancer 98% dead" Lns back to normal size  - 5/26/18 PSA= 0.01 ng/ml  - 5/27/18 3rd cycle of chemo began  - tolerated chemotherapy well - did receive neupogen x 3 days post  - completed 6 cycles 7/29/16  - was doing scans q 6 months and blood work every 3 months  - Dr. Hoskins left [facility] May 2017- notified by letter  - Dr. Costello-   - 8/17 scans and labs stable  - Dr. Sethi was assigned most recently " as medical oncologist  - July 2018 PSA= 1.68 ng/ml  - 10/1518 CT scans and bone scan  - 10/22/18 saw Dr. Sethi  PSA 1.58 ng/ml  LNs increased slightly on scans  Bone scan negative  Advised him to start Zytiga, started 11/6/18 (with Prednisone)  - concerned about medication and side effects  Feels like he did well on chemotherapy  Concerned about MD turnover  Sent to a chemo class and questions why for this regimen and then got a bill for a co-pay  - came here for a second opinion     - started Zytiga and Prednisone.   -progression noted recently and taxotere re-initiated.         HM:  Colonoscopy neg 1 yr ago        PMFSH: all information reviewed and updated as relevant to today's visit    Review of Systems   Constitutional: Negative for activity change, appetite change, fatigue (mild) and fever.   HENT: Negative for mouth sores, nosebleeds and sore throat.    Eyes: Negative for visual disturbance.   Respiratory: Negative for cough and shortness of breath.    Cardiovascular: Negative for chest pain, palpitations and leg swelling.   Gastrointestinal: Negative for abdominal pain, constipation, diarrhea, nausea and vomiting.   Genitourinary: Negative for difficulty urinating and frequency.   Musculoskeletal: Positive for arthralgias (left hip- no worse than usual). Negative for back pain and joint swelling.   Skin: Negative for rash.   Neurological: Negative for dizziness, numbness and headaches.   Hematological: Negative for adenopathy. Does not bruise/bleed easily.   Psychiatric/Behavioral: Negative for confusion and sleep disturbance. The patient is not nervous/anxious.        Objective:      Physical Exam   Constitutional: He is oriented to person, place, and time. He appears well-developed and well-nourished. No distress.   Presents with his wife   ECOG=0   HENT:   Head: Normocephalic and atraumatic.   Nose: Nose normal.   Mouth/Throat: Oropharynx is clear and moist.   Eyes: Pupils are equal, round, and reactive  to light. Conjunctivae and lids are normal. No scleral icterus.   Neck: Trachea normal and normal range of motion. Neck supple. No JVD present. No thyromegaly present.   Cardiovascular: Normal rate, regular rhythm, normal heart sounds, intact distal pulses and normal pulses.   No murmur heard.  Pulmonary/Chest: Effort normal and breath sounds normal. He has no wheezes.   Abdominal: Soft. Normal appearance and bowel sounds are normal. He exhibits no distension and no mass. There is no hepatosplenomegaly or splenomegaly. There is no tenderness.   No organomegaly.    Musculoskeletal: Normal range of motion. He exhibits no edema, tenderness or deformity.   No spinal or paraspinal tenderness.    Lymphadenopathy:        Head (right side): No submental and no submandibular adenopathy present.        Head (left side): No submental and no submandibular adenopathy present.     He has no cervical adenopathy.     He has no axillary adenopathy.        Right: No supraclavicular adenopathy present.        Left: No supraclavicular adenopathy present.   Neurological: He is alert and oriented to person, place, and time. He has normal reflexes. He displays normal reflexes. No sensory deficit. Coordination normal.   Skin: Skin is warm, dry and intact. Capillary refill takes less than 2 seconds. No bruising and no rash noted. He is not diaphoretic. Nails show no clubbing.   Psychiatric: He has a normal mood and affect. His speech is normal and behavior is normal. Cognition and memory are normal.   Nursing note and vitals reviewed.    Labs- reviewed  Assessment:       1. 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 Bone metastases        Plan:     1-2. PSA up on Taxotere  We discussed differential - we would recommend scans first before declaring this a progression as sometimes disease lysis causes  RTC post

## 2019-12-31 NOTE — PLAN OF CARE
Pt received Taxotere today and tolerated well, without complications. VSS throughout infusion. Educated patient about Taxotere (indications, side effects, possible reactions, chemotherapy precautions) and verbalized understanding. PIV positive for blood return, saline locked and removed prior to DC, catheter tip intact. Pt DC with no distress noted, ambulated off of unit independently with wife.

## 2020-01-01 ENCOUNTER — LAB VISIT (OUTPATIENT)
Dept: INTERNAL MEDICINE | Facility: CLINIC | Age: 55
End: 2020-01-01
Payer: COMMERCIAL

## 2020-01-01 ENCOUNTER — TELEPHONE (OUTPATIENT)
Dept: HEMATOLOGY/ONCOLOGY | Facility: CLINIC | Age: 55
End: 2020-01-01

## 2020-01-01 ENCOUNTER — LAB VISIT (OUTPATIENT)
Dept: LAB | Facility: HOSPITAL | Age: 55
End: 2020-01-01
Attending: INTERNAL MEDICINE
Payer: COMMERCIAL

## 2020-01-01 ENCOUNTER — HOSPITAL ENCOUNTER (OUTPATIENT)
Dept: RADIOLOGY | Facility: HOSPITAL | Age: 55
Discharge: HOME | End: 2020-04-03
Attending: NURSE PRACTITIONER
Payer: COMMERCIAL

## 2020-01-01 ENCOUNTER — INFUSION (OUTPATIENT)
Dept: INFUSION THERAPY | Facility: HOSPITAL | Age: 55
End: 2020-01-01
Payer: COMMERCIAL

## 2020-01-01 ENCOUNTER — HOSPITAL ENCOUNTER (OUTPATIENT)
Dept: RADIATION THERAPY | Facility: HOSPITAL | Age: 55
Discharge: HOME | End: 2020-03-20
Attending: RADIOLOGY
Payer: COMMERCIAL

## 2020-01-01 ENCOUNTER — PATIENT MESSAGE (OUTPATIENT)
Dept: HEMATOLOGY/ONCOLOGY | Facility: CLINIC | Age: 55
End: 2020-01-01

## 2020-01-01 ENCOUNTER — HOSPITAL ENCOUNTER (OUTPATIENT)
Dept: RADIOLOGY | Facility: HOSPITAL | Age: 55
Discharge: HOME | End: 2020-03-02
Attending: INTERNAL MEDICINE
Payer: COMMERCIAL

## 2020-01-01 ENCOUNTER — HOSPITAL ENCOUNTER (EMERGENCY)
Facility: HOSPITAL | Age: 55
Discharge: HOME | End: 2020-09-29
Attending: EMERGENCY MEDICINE
Payer: COMMERCIAL

## 2020-01-01 ENCOUNTER — OFFICE VISIT (OUTPATIENT)
Dept: HEMATOLOGY/ONCOLOGY | Facility: CLINIC | Age: 55
End: 2020-01-01
Payer: COMMERCIAL

## 2020-01-01 ENCOUNTER — TELEPHONE (OUTPATIENT)
Dept: RADIATION ONCOLOGY | Facility: CLINIC | Age: 55
End: 2020-01-01

## 2020-01-01 ENCOUNTER — HOSPITAL ENCOUNTER (OUTPATIENT)
Dept: RADIOLOGY | Facility: HOSPITAL | Age: 55
Discharge: HOME | End: 2020-03-10
Attending: INTERNAL MEDICINE
Payer: COMMERCIAL

## 2020-01-01 ENCOUNTER — HOSPITAL ENCOUNTER (OUTPATIENT)
Dept: RADIOLOGY | Facility: HOSPITAL | Age: 55
Discharge: HOME | End: 2020-07-09
Attending: NURSE PRACTITIONER
Payer: COMMERCIAL

## 2020-01-01 ENCOUNTER — INITIAL CONSULT (OUTPATIENT)
Dept: RADIATION ONCOLOGY | Facility: CLINIC | Age: 55
End: 2020-01-01
Payer: COMMERCIAL

## 2020-01-01 ENCOUNTER — DOCUMENTATION ONLY (OUTPATIENT)
Dept: HEMATOLOGY/ONCOLOGY | Facility: CLINIC | Age: 55
End: 2020-01-01

## 2020-01-01 ENCOUNTER — DOCUMENTATION ONLY (OUTPATIENT)
Dept: RADIATION ONCOLOGY | Facility: CLINIC | Age: 55
End: 2020-01-01

## 2020-01-01 ENCOUNTER — TELEPHONE (OUTPATIENT)
Dept: INFUSION THERAPY | Facility: HOSPITAL | Age: 55
End: 2020-01-01

## 2020-01-01 ENCOUNTER — OFFICE VISIT (OUTPATIENT)
Dept: URGENT CARE | Facility: CLINIC | Age: 55
End: 2020-01-01
Payer: COMMERCIAL

## 2020-01-01 ENCOUNTER — CLINICAL SUPPORT (OUTPATIENT)
Dept: PALLIATIVE MEDICINE | Facility: CLINIC | Age: 55
End: 2020-01-01
Payer: COMMERCIAL

## 2020-01-01 ENCOUNTER — HOSPITAL ENCOUNTER (OUTPATIENT)
Dept: RADIOLOGY | Facility: HOSPITAL | Age: 55
Discharge: HOME | End: 2020-01-09
Attending: INTERNAL MEDICINE
Payer: COMMERCIAL

## 2020-01-01 ENCOUNTER — OFFICE VISIT (OUTPATIENT)
Dept: RADIATION ONCOLOGY | Facility: CLINIC | Age: 55
End: 2020-01-01
Payer: COMMERCIAL

## 2020-01-01 ENCOUNTER — CARE AT HOME (OUTPATIENT)
Dept: HOME HEALTH SERVICES | Facility: CLINIC | Age: 55
End: 2020-01-01
Payer: COMMERCIAL

## 2020-01-01 ENCOUNTER — PATIENT MESSAGE (OUTPATIENT)
Dept: PHARMACY | Facility: CLINIC | Age: 55
End: 2020-01-01

## 2020-01-01 ENCOUNTER — HOSPITAL ENCOUNTER (OUTPATIENT)
Dept: RADIATION THERAPY | Facility: HOSPITAL | Age: 55
Discharge: HOME | End: 2020-04-20
Attending: RADIOLOGY
Payer: COMMERCIAL

## 2020-01-01 ENCOUNTER — DOCUMENTATION ONLY (OUTPATIENT)
Dept: ONCOLOGY | Facility: HOSPITAL | Age: 55
End: 2020-01-01

## 2020-01-01 ENCOUNTER — TELEPHONE (OUTPATIENT)
Dept: PALLIATIVE MEDICINE | Facility: CLINIC | Age: 55
End: 2020-01-01

## 2020-01-01 ENCOUNTER — HOSPITAL ENCOUNTER (OUTPATIENT)
Dept: RADIOLOGY | Facility: HOSPITAL | Age: 55
Discharge: HOME | End: 2020-02-03
Attending: INTERNAL MEDICINE
Payer: COMMERCIAL

## 2020-01-01 ENCOUNTER — LAB VISIT (OUTPATIENT)
Dept: LAB | Facility: HOSPITAL | Age: 55
End: 2020-01-01
Payer: COMMERCIAL

## 2020-01-01 ENCOUNTER — HOSPITAL ENCOUNTER (OUTPATIENT)
Dept: RADIATION THERAPY | Facility: HOSPITAL | Age: 55
Discharge: HOME | End: 2020-07-10
Attending: RADIOLOGY
Payer: COMMERCIAL

## 2020-01-01 ENCOUNTER — CLINICAL SUPPORT (OUTPATIENT)
Dept: HEMATOLOGY/ONCOLOGY | Facility: CLINIC | Age: 55
End: 2020-01-01
Payer: COMMERCIAL

## 2020-01-01 ENCOUNTER — TELEPHONE (OUTPATIENT)
Dept: HOME HEALTH SERVICES | Facility: CLINIC | Age: 55
End: 2020-01-01

## 2020-01-01 ENCOUNTER — HOSPITAL ENCOUNTER (EMERGENCY)
Facility: HOSPITAL | Age: 55
Discharge: HOME | End: 2020-02-20
Attending: EMERGENCY MEDICINE
Payer: COMMERCIAL

## 2020-01-01 ENCOUNTER — HOSPITAL ENCOUNTER (OUTPATIENT)
Facility: HOSPITAL | Age: 55
Discharge: HOME | DRG: 948 | End: 2020-06-06
Attending: INTERNAL MEDICINE | Admitting: INTERNAL MEDICINE
Payer: COMMERCIAL

## 2020-01-01 ENCOUNTER — CLINICAL SUPPORT (OUTPATIENT)
Dept: HEMATOLOGY/ONCOLOGY | Facility: CLINIC | Age: 55
DRG: 948 | End: 2020-01-01
Payer: COMMERCIAL

## 2020-01-01 ENCOUNTER — TELEPHONE (OUTPATIENT)
Dept: PHARMACY | Facility: CLINIC | Age: 55
End: 2020-01-01

## 2020-01-01 VITALS
OXYGEN SATURATION: 95 % | HEIGHT: 69 IN | SYSTOLIC BLOOD PRESSURE: 128 MMHG | DIASTOLIC BLOOD PRESSURE: 83 MMHG | RESPIRATION RATE: 16 BRPM | HEART RATE: 70 BPM | BODY MASS INDEX: 26.51 KG/M2 | TEMPERATURE: 98 F | WEIGHT: 179 LBS

## 2020-01-01 VITALS
SYSTOLIC BLOOD PRESSURE: 124 MMHG | WEIGHT: 136 LBS | TEMPERATURE: 98 F | HEIGHT: 69 IN | HEART RATE: 76 BPM | RESPIRATION RATE: 20 BRPM | DIASTOLIC BLOOD PRESSURE: 64 MMHG | BODY MASS INDEX: 20.14 KG/M2 | OXYGEN SATURATION: 97 %

## 2020-01-01 VITALS
HEIGHT: 69 IN | OXYGEN SATURATION: 96 % | DIASTOLIC BLOOD PRESSURE: 67 MMHG | SYSTOLIC BLOOD PRESSURE: 103 MMHG | BODY MASS INDEX: 25.73 KG/M2 | HEART RATE: 89 BPM | RESPIRATION RATE: 16 BRPM | WEIGHT: 173.75 LBS | TEMPERATURE: 100 F

## 2020-01-01 VITALS
HEIGHT: 69 IN | BODY MASS INDEX: 20.24 KG/M2 | WEIGHT: 136.69 LBS | TEMPERATURE: 98 F | SYSTOLIC BLOOD PRESSURE: 136 MMHG | OXYGEN SATURATION: 98 % | DIASTOLIC BLOOD PRESSURE: 80 MMHG | HEART RATE: 103 BPM | RESPIRATION RATE: 18 BRPM

## 2020-01-01 VITALS
DIASTOLIC BLOOD PRESSURE: 69 MMHG | BODY MASS INDEX: 22.04 KG/M2 | WEIGHT: 148.81 LBS | OXYGEN SATURATION: 100 % | RESPIRATION RATE: 18 BRPM | HEIGHT: 69 IN | HEART RATE: 89 BPM | SYSTOLIC BLOOD PRESSURE: 108 MMHG | TEMPERATURE: 97 F

## 2020-01-01 VITALS
TEMPERATURE: 98 F | DIASTOLIC BLOOD PRESSURE: 85 MMHG | TEMPERATURE: 99 F | OXYGEN SATURATION: 99 % | RESPIRATION RATE: 18 BRPM | DIASTOLIC BLOOD PRESSURE: 74 MMHG | HEART RATE: 84 BPM | BODY MASS INDEX: 20.54 KG/M2 | HEART RATE: 87 BPM | WEIGHT: 138.69 LBS | RESPIRATION RATE: 18 BRPM | BODY MASS INDEX: 22.21 KG/M2 | SYSTOLIC BLOOD PRESSURE: 126 MMHG | SYSTOLIC BLOOD PRESSURE: 116 MMHG | WEIGHT: 150.38 LBS | HEIGHT: 69 IN

## 2020-01-01 VITALS
HEIGHT: 69 IN | SYSTOLIC BLOOD PRESSURE: 121 MMHG | HEART RATE: 85 BPM | WEIGHT: 192.69 LBS | DIASTOLIC BLOOD PRESSURE: 83 MMHG | OXYGEN SATURATION: 96 % | TEMPERATURE: 98 F | RESPIRATION RATE: 16 BRPM | BODY MASS INDEX: 28.54 KG/M2

## 2020-01-01 VITALS
HEIGHT: 69 IN | OXYGEN SATURATION: 100 % | SYSTOLIC BLOOD PRESSURE: 128 MMHG | DIASTOLIC BLOOD PRESSURE: 74 MMHG | BODY MASS INDEX: 18.07 KG/M2 | TEMPERATURE: 98 F | HEART RATE: 65 BPM | RESPIRATION RATE: 16 BRPM | WEIGHT: 122 LBS

## 2020-01-01 VITALS — HEIGHT: 69 IN | BODY MASS INDEX: 24.23 KG/M2 | WEIGHT: 163.56 LBS

## 2020-01-01 VITALS
HEART RATE: 84 BPM | HEIGHT: 69 IN | TEMPERATURE: 98 F | WEIGHT: 153.31 LBS | SYSTOLIC BLOOD PRESSURE: 122 MMHG | OXYGEN SATURATION: 98 % | DIASTOLIC BLOOD PRESSURE: 81 MMHG | RESPIRATION RATE: 16 BRPM | BODY MASS INDEX: 22.71 KG/M2

## 2020-01-01 VITALS
HEART RATE: 93 BPM | HEIGHT: 69 IN | WEIGHT: 192 LBS | TEMPERATURE: 100 F | DIASTOLIC BLOOD PRESSURE: 74 MMHG | OXYGEN SATURATION: 98 % | SYSTOLIC BLOOD PRESSURE: 121 MMHG | BODY MASS INDEX: 28.44 KG/M2

## 2020-01-01 VITALS
HEIGHT: 69 IN | SYSTOLIC BLOOD PRESSURE: 128 MMHG | HEART RATE: 76 BPM | BODY MASS INDEX: 24.78 KG/M2 | TEMPERATURE: 98 F | OXYGEN SATURATION: 100 % | RESPIRATION RATE: 18 BRPM | DIASTOLIC BLOOD PRESSURE: 81 MMHG | WEIGHT: 167.31 LBS

## 2020-01-01 VITALS
SYSTOLIC BLOOD PRESSURE: 128 MMHG | RESPIRATION RATE: 16 BRPM | WEIGHT: 184.5 LBS | OXYGEN SATURATION: 98 % | TEMPERATURE: 99 F | DIASTOLIC BLOOD PRESSURE: 78 MMHG | HEART RATE: 80 BPM | BODY MASS INDEX: 27.25 KG/M2

## 2020-01-01 VITALS
TEMPERATURE: 98 F | RESPIRATION RATE: 18 BRPM | WEIGHT: 161.81 LBS | DIASTOLIC BLOOD PRESSURE: 81 MMHG | BODY MASS INDEX: 23.89 KG/M2 | SYSTOLIC BLOOD PRESSURE: 122 MMHG | HEART RATE: 82 BPM

## 2020-01-01 VITALS — WEIGHT: 150.81 LBS | BODY MASS INDEX: 22.34 KG/M2 | HEIGHT: 69 IN

## 2020-01-01 DIAGNOSIS — C61 PROSTATE CANCER METASTATIC TO MULTIPLE SITES: Primary | ICD-10-CM

## 2020-01-01 DIAGNOSIS — C79.51 BONE METASTASES: ICD-10-CM

## 2020-01-01 DIAGNOSIS — C61 PROSTATE CANCER METASTATIC TO INTRAPELVIC LYMPH NODE: Primary | ICD-10-CM

## 2020-01-01 DIAGNOSIS — N30.00 ACUTE CYSTITIS WITHOUT HEMATURIA: ICD-10-CM

## 2020-01-01 DIAGNOSIS — G89.3 NEOPLASM RELATED PAIN: ICD-10-CM

## 2020-01-01 DIAGNOSIS — C79.51 PROSTATE CANCER METASTATIC TO BONE: ICD-10-CM

## 2020-01-01 DIAGNOSIS — C61 PROSTATE CANCER METASTATIC TO INTRAPELVIC LYMPH NODE: ICD-10-CM

## 2020-01-01 DIAGNOSIS — C79.51 BONE METASTASES: Primary | ICD-10-CM

## 2020-01-01 DIAGNOSIS — C77.5 PROSTATE CANCER METASTATIC TO INTRAPELVIC LYMPH NODE: ICD-10-CM

## 2020-01-01 DIAGNOSIS — C77.5 PROSTATE CANCER METASTATIC TO INTRAPELVIC LYMPH NODE: Primary | ICD-10-CM

## 2020-01-01 DIAGNOSIS — Z71.89 COUNSELING REGARDING ADVANCE CARE PLANNING AND GOALS OF CARE: ICD-10-CM

## 2020-01-01 DIAGNOSIS — C61 METASTASIS FROM HORMONE-REFRACTORY PROSTATE CANCER: Primary | ICD-10-CM

## 2020-01-01 DIAGNOSIS — C79.9 METASTASIS FROM HORMONE-REFRACTORY PROSTATE CANCER: Primary | ICD-10-CM

## 2020-01-01 DIAGNOSIS — Z13.9 SCREENING FOR UNSPECIFIED CONDITION: ICD-10-CM

## 2020-01-01 DIAGNOSIS — R07.9 CHEST PAIN: ICD-10-CM

## 2020-01-01 DIAGNOSIS — C61 PROSTATE CANCER METASTATIC TO BONE: ICD-10-CM

## 2020-01-01 DIAGNOSIS — M89.8X9 MALIGNANT BONE PAIN: ICD-10-CM

## 2020-01-01 DIAGNOSIS — D64.9 ANEMIA, UNSPECIFIED TYPE: ICD-10-CM

## 2020-01-01 DIAGNOSIS — G89.3 NEOPLASM RELATED PAIN: Primary | ICD-10-CM

## 2020-01-01 DIAGNOSIS — Z71.89 ACP (ADVANCE CARE PLANNING): ICD-10-CM

## 2020-01-01 DIAGNOSIS — Z51.11 ENCOUNTER FOR ANTINEOPLASTIC CHEMOTHERAPY: ICD-10-CM

## 2020-01-01 DIAGNOSIS — R63.4 WEIGHT LOSS: ICD-10-CM

## 2020-01-01 DIAGNOSIS — R39.198 DIFFICULTY URINATING: ICD-10-CM

## 2020-01-01 DIAGNOSIS — R26.81 UNSTEADY GAIT: ICD-10-CM

## 2020-01-01 DIAGNOSIS — D64.81 ANTINEOPLASTIC CHEMOTHERAPY INDUCED ANEMIA: ICD-10-CM

## 2020-01-01 DIAGNOSIS — T45.1X5A ANTINEOPLASTIC CHEMOTHERAPY INDUCED ANEMIA: ICD-10-CM

## 2020-01-01 DIAGNOSIS — D69.6 THROMBOCYTOPENIA: Primary | ICD-10-CM

## 2020-01-01 DIAGNOSIS — E44.0 PROTEIN-CALORIE MALNUTRITION, MODERATE: ICD-10-CM

## 2020-01-01 DIAGNOSIS — R10.31 RIGHT LOWER QUADRANT ABDOMINAL PAIN: Primary | ICD-10-CM

## 2020-01-01 DIAGNOSIS — K59.00 CONSTIPATION, UNSPECIFIED CONSTIPATION TYPE: ICD-10-CM

## 2020-01-01 DIAGNOSIS — R53.83 FATIGUE, UNSPECIFIED TYPE: ICD-10-CM

## 2020-01-01 DIAGNOSIS — S40.812A ABRASION OF LEFT UPPER EXTREMITY, INITIAL ENCOUNTER: ICD-10-CM

## 2020-01-01 DIAGNOSIS — R11.2 NON-INTRACTABLE VOMITING WITH NAUSEA, UNSPECIFIED VOMITING TYPE: Primary | ICD-10-CM

## 2020-01-01 DIAGNOSIS — C79.31 BRAIN METASTASES: Primary | ICD-10-CM

## 2020-01-01 DIAGNOSIS — C79.51 PROSTATE CANCER METASTATIC TO BONE: Primary | ICD-10-CM

## 2020-01-01 DIAGNOSIS — Z71.3 NUTRITIONAL COUNSELING: Primary | ICD-10-CM

## 2020-01-01 DIAGNOSIS — R59.1 LYMPHADENOPATHY: ICD-10-CM

## 2020-01-01 DIAGNOSIS — R63.4 UNINTENTIONAL WEIGHT LOSS OF 10% BODY WEIGHT WITHIN 6 MONTHS: ICD-10-CM

## 2020-01-01 DIAGNOSIS — E44.0 MODERATE PROTEIN-CALORIE MALNUTRITION: ICD-10-CM

## 2020-01-01 DIAGNOSIS — R64 CACHECTIC: ICD-10-CM

## 2020-01-01 DIAGNOSIS — R63.4 WEIGHT LOSS: Primary | ICD-10-CM

## 2020-01-01 DIAGNOSIS — R41.82 ALTERED MENTAL STATUS, UNSPECIFIED ALTERED MENTAL STATUS TYPE: ICD-10-CM

## 2020-01-01 DIAGNOSIS — G25.81 RESTLESS LEG SYNDROME: ICD-10-CM

## 2020-01-01 DIAGNOSIS — G89.3 CHRONIC NEOPLASM-RELATED PAIN: ICD-10-CM

## 2020-01-01 DIAGNOSIS — C61 PROSTATE CANCER METASTATIC TO BONE: Primary | ICD-10-CM

## 2020-01-01 DIAGNOSIS — C61 PROSTATE CANCER METASTATIC TO MULTIPLE SITES: ICD-10-CM

## 2020-01-01 DIAGNOSIS — Z51.5 PALLIATIVE CARE BY SPECIALIST: ICD-10-CM

## 2020-01-01 DIAGNOSIS — Z51.5 PALLIATIVE CARE ENCOUNTER: ICD-10-CM

## 2020-01-01 DIAGNOSIS — B00.9 HERPES INFECTION: Primary | ICD-10-CM

## 2020-01-01 DIAGNOSIS — M25.551 RIGHT HIP PAIN: ICD-10-CM

## 2020-01-01 DIAGNOSIS — G89.3 MALIGNANT BONE PAIN: ICD-10-CM

## 2020-01-01 DIAGNOSIS — Z85.46 HISTORY OF PROSTATE CANCER: ICD-10-CM

## 2020-01-01 DIAGNOSIS — K52.9 ENTERITIS: Primary | ICD-10-CM

## 2020-01-01 DIAGNOSIS — J06.9 UPPER RESPIRATORY TRACT INFECTION, UNSPECIFIED TYPE: Primary | ICD-10-CM

## 2020-01-01 DIAGNOSIS — R63.0 ANOREXIA: ICD-10-CM

## 2020-01-01 DIAGNOSIS — Z13.9 SCREENING FOR UNSPECIFIED CONDITION: Primary | ICD-10-CM

## 2020-01-01 DIAGNOSIS — R53.1 GENERALIZED WEAKNESS: ICD-10-CM

## 2020-01-01 DIAGNOSIS — R63.0 ANOREXIA: Primary | ICD-10-CM

## 2020-01-01 DIAGNOSIS — E43 SEVERE PROTEIN-CALORIE MALNUTRITION: ICD-10-CM

## 2020-01-01 LAB
ALBUMIN SERPL BCP-MCNC: 2.7 G/DL (ref 3.5–5.2)
ALBUMIN SERPL BCP-MCNC: 2.8 G/DL (ref 3.5–5.2)
ALBUMIN SERPL BCP-MCNC: 2.8 G/DL (ref 3.5–5.2)
ALBUMIN SERPL BCP-MCNC: 2.9 G/DL (ref 3.5–5.2)
ALBUMIN SERPL BCP-MCNC: 3 G/DL (ref 3.5–5.2)
ALBUMIN SERPL BCP-MCNC: 3.2 G/DL (ref 3.5–5.2)
ALBUMIN SERPL BCP-MCNC: 3.5 G/DL (ref 3.5–5.2)
ALBUMIN SERPL BCP-MCNC: 3.5 G/DL (ref 3.5–5.2)
ALBUMIN SERPL BCP-MCNC: 3.6 G/DL (ref 3.5–5.2)
ALBUMIN SERPL BCP-MCNC: 3.6 G/DL (ref 3.5–5.2)
ALBUMIN SERPL BCP-MCNC: 3.7 G/DL (ref 3.5–5.2)
ALBUMIN SERPL BCP-MCNC: 3.7 G/DL (ref 3.5–5.2)
ALBUMIN SERPL BCP-MCNC: 4.1 G/DL (ref 3.5–5.2)
ALP SERPL-CCNC: 340 U/L (ref 55–135)
ALP SERPL-CCNC: 408 U/L (ref 55–135)
ALP SERPL-CCNC: 475 U/L (ref 55–135)
ALP SERPL-CCNC: 488 U/L (ref 55–135)
ALP SERPL-CCNC: 584 U/L (ref 55–135)
ALP SERPL-CCNC: 629 U/L (ref 55–135)
ALP SERPL-CCNC: 649 U/L (ref 55–135)
ALP SERPL-CCNC: 682 U/L (ref 55–135)
ALP SERPL-CCNC: 710 U/L (ref 55–135)
ALP SERPL-CCNC: 759 U/L (ref 55–135)
ALP SERPL-CCNC: 770 U/L (ref 55–135)
ALP SERPL-CCNC: 880 U/L (ref 55–135)
ALP SERPL-CCNC: 895 U/L (ref 55–135)
ALT SERPL W/O P-5'-P-CCNC: 10 U/L (ref 10–44)
ALT SERPL W/O P-5'-P-CCNC: 10 U/L (ref 10–44)
ALT SERPL W/O P-5'-P-CCNC: 12 U/L (ref 10–44)
ALT SERPL W/O P-5'-P-CCNC: 17 U/L (ref 10–44)
ALT SERPL W/O P-5'-P-CCNC: 17 U/L (ref 10–44)
ALT SERPL W/O P-5'-P-CCNC: 18 U/L (ref 10–44)
ALT SERPL W/O P-5'-P-CCNC: 19 U/L (ref 10–44)
ALT SERPL W/O P-5'-P-CCNC: 24 U/L (ref 10–44)
ALT SERPL W/O P-5'-P-CCNC: 27 U/L (ref 10–44)
ALT SERPL W/O P-5'-P-CCNC: 7 U/L (ref 10–44)
ALT SERPL W/O P-5'-P-CCNC: 8 U/L (ref 10–44)
ALT SERPL W/O P-5'-P-CCNC: 9 U/L (ref 10–44)
ALT SERPL W/O P-5'-P-CCNC: 9 U/L (ref 10–44)
ANION GAP SERPL CALC-SCNC: 10 MMOL/L (ref 8–16)
ANION GAP SERPL CALC-SCNC: 10 MMOL/L (ref 8–16)
ANION GAP SERPL CALC-SCNC: 11 MMOL/L (ref 8–16)
ANION GAP SERPL CALC-SCNC: 12 MMOL/L (ref 8–16)
ANION GAP SERPL CALC-SCNC: 4 MMOL/L (ref 8–16)
ANION GAP SERPL CALC-SCNC: 6 MMOL/L (ref 8–16)
ANION GAP SERPL CALC-SCNC: 6 MMOL/L (ref 8–16)
ANION GAP SERPL CALC-SCNC: 7 MMOL/L (ref 8–16)
ANION GAP SERPL CALC-SCNC: 7 MMOL/L (ref 8–16)
ANION GAP SERPL CALC-SCNC: 8 MMOL/L (ref 8–16)
ANION GAP SERPL CALC-SCNC: 8 MMOL/L (ref 8–16)
ANION GAP SERPL CALC-SCNC: 9 MMOL/L (ref 8–16)
ANISOCYTOSIS BLD QL SMEAR: SLIGHT
AST SERPL-CCNC: 14 U/L (ref 10–40)
AST SERPL-CCNC: 17 U/L (ref 10–40)
AST SERPL-CCNC: 18 U/L (ref 10–40)
AST SERPL-CCNC: 18 U/L (ref 10–40)
AST SERPL-CCNC: 19 U/L (ref 10–40)
AST SERPL-CCNC: 20 U/L (ref 10–40)
AST SERPL-CCNC: 22 U/L (ref 10–40)
AST SERPL-CCNC: 23 U/L (ref 10–40)
AST SERPL-CCNC: 24 U/L (ref 10–40)
AST SERPL-CCNC: 27 U/L (ref 10–40)
AST SERPL-CCNC: 28 U/L (ref 10–40)
AST SERPL-CCNC: 28 U/L (ref 10–40)
AST SERPL-CCNC: 32 U/L (ref 10–40)
BASOPHILS # BLD AUTO: 0.01 K/UL (ref 0–0.2)
BASOPHILS # BLD AUTO: 0.01 K/UL (ref 0–0.2)
BASOPHILS # BLD AUTO: 0.02 K/UL (ref 0–0.2)
BASOPHILS # BLD AUTO: 0.02 K/UL (ref 0–0.2)
BASOPHILS NFR BLD: 0 % (ref 0–1.9)
BASOPHILS NFR BLD: 0.1 % (ref 0–1.9)
BASOPHILS NFR BLD: 0.1 % (ref 0–1.9)
BASOPHILS NFR BLD: 0.2 % (ref 0–1.9)
BASOPHILS NFR BLD: 0.4 % (ref 0–1.9)
BILIRUB SERPL-MCNC: 0.3 MG/DL (ref 0.1–1)
BILIRUB SERPL-MCNC: 0.4 MG/DL (ref 0.1–1)
BILIRUB SERPL-MCNC: 0.5 MG/DL (ref 0.1–1)
BILIRUB SERPL-MCNC: 0.5 MG/DL (ref 0.1–1)
BILIRUB SERPL-MCNC: 0.6 MG/DL (ref 0.1–1)
BILIRUB SERPL-MCNC: 0.7 MG/DL (ref 0.1–1)
BILIRUB SERPL-MCNC: 0.7 MG/DL (ref 0.1–1)
BILIRUB UR QL STRIP: NEGATIVE
BILIRUB UR QL STRIP: NEGATIVE
BUN SERPL-MCNC: 10 MG/DL (ref 6–20)
BUN SERPL-MCNC: 11 MG/DL (ref 6–20)
BUN SERPL-MCNC: 13 MG/DL (ref 6–20)
BUN SERPL-MCNC: 13 MG/DL (ref 6–20)
BUN SERPL-MCNC: 6 MG/DL (ref 6–20)
BUN SERPL-MCNC: 6 MG/DL (ref 6–20)
BUN SERPL-MCNC: 7 MG/DL (ref 6–20)
BUN SERPL-MCNC: 7 MG/DL (ref 6–20)
BUN SERPL-MCNC: 8 MG/DL (ref 6–20)
BUN SERPL-MCNC: 9 MG/DL (ref 6–20)
BUN SERPL-MCNC: 9 MG/DL (ref 6–20)
CALCIUM SERPL-MCNC: 8.1 MG/DL (ref 8.7–10.5)
CALCIUM SERPL-MCNC: 8.2 MG/DL (ref 8.7–10.5)
CALCIUM SERPL-MCNC: 8.3 MG/DL (ref 8.7–10.5)
CALCIUM SERPL-MCNC: 8.3 MG/DL (ref 8.7–10.5)
CALCIUM SERPL-MCNC: 8.4 MG/DL (ref 8.7–10.5)
CALCIUM SERPL-MCNC: 8.4 MG/DL (ref 8.7–10.5)
CALCIUM SERPL-MCNC: 8.5 MG/DL (ref 8.7–10.5)
CALCIUM SERPL-MCNC: 8.6 MG/DL (ref 8.7–10.5)
CALCIUM SERPL-MCNC: 8.6 MG/DL (ref 8.7–10.5)
CALCIUM SERPL-MCNC: 8.7 MG/DL (ref 8.7–10.5)
CALCIUM SERPL-MCNC: 8.8 MG/DL (ref 8.7–10.5)
CALCIUM SERPL-MCNC: 9.3 MG/DL (ref 8.7–10.5)
CHLORIDE SERPL-SCNC: 100 MMOL/L (ref 95–110)
CHLORIDE SERPL-SCNC: 102 MMOL/L (ref 95–110)
CHLORIDE SERPL-SCNC: 103 MMOL/L (ref 95–110)
CHLORIDE SERPL-SCNC: 104 MMOL/L (ref 95–110)
CHLORIDE SERPL-SCNC: 105 MMOL/L (ref 95–110)
CHLORIDE SERPL-SCNC: 105 MMOL/L (ref 95–110)
CHLORIDE SERPL-SCNC: 106 MMOL/L (ref 95–110)
CHLORIDE SERPL-SCNC: 106 MMOL/L (ref 95–110)
CHLORIDE SERPL-SCNC: 96 MMOL/L (ref 95–110)
CHLORIDE SERPL-SCNC: 98 MMOL/L (ref 95–110)
CLARITY UR REFRACT.AUTO: CLEAR
CLARITY UR REFRACT.AUTO: CLEAR
CO2 SERPL-SCNC: 22 MMOL/L (ref 23–29)
CO2 SERPL-SCNC: 23 MMOL/L (ref 23–29)
CO2 SERPL-SCNC: 24 MMOL/L (ref 23–29)
CO2 SERPL-SCNC: 24 MMOL/L (ref 23–29)
CO2 SERPL-SCNC: 25 MMOL/L (ref 23–29)
CO2 SERPL-SCNC: 25 MMOL/L (ref 23–29)
CO2 SERPL-SCNC: 26 MMOL/L (ref 23–29)
CO2 SERPL-SCNC: 27 MMOL/L (ref 23–29)
CO2 SERPL-SCNC: 29 MMOL/L (ref 23–29)
COLOR UR AUTO: YELLOW
COLOR UR AUTO: YELLOW
COMPLEXED PSA SERPL-MCNC: 10.3 NG/ML (ref 0–4)
COMPLEXED PSA SERPL-MCNC: 15.2 NG/ML (ref 0–4)
COMPLEXED PSA SERPL-MCNC: 29.1 NG/ML (ref 0–4)
COMPLEXED PSA SERPL-MCNC: 6.7 NG/ML (ref 0–4)
COMPLEXED PSA SERPL-MCNC: 8.3 NG/ML (ref 0–4)
CREAT SERPL-MCNC: 0.6 MG/DL (ref 0.5–1.4)
CREAT SERPL-MCNC: 0.7 MG/DL (ref 0.5–1.4)
CREAT SERPL-MCNC: 0.8 MG/DL (ref 0.5–1.4)
CREAT SERPL-MCNC: 0.9 MG/DL (ref 0.5–1.4)
CTP QC/QA: YES
DIFFERENTIAL METHOD: ABNORMAL
EOSINOPHIL # BLD AUTO: 0 K/UL (ref 0–0.5)
EOSINOPHIL # BLD AUTO: 0.1 K/UL (ref 0–0.5)
EOSINOPHIL NFR BLD: 0.4 % (ref 0–8)
EOSINOPHIL NFR BLD: 0.6 % (ref 0–8)
EOSINOPHIL NFR BLD: 1.1 % (ref 0–8)
EOSINOPHIL NFR BLD: 2.8 % (ref 0–8)
EOSINOPHIL NFR BLD: 3 % (ref 0–8)
ERYTHROCYTE [DISTWIDTH] IN BLOOD BY AUTOMATED COUNT: 15.1 % (ref 11.5–14.5)
ERYTHROCYTE [DISTWIDTH] IN BLOOD BY AUTOMATED COUNT: 15.3 % (ref 11.5–14.5)
ERYTHROCYTE [DISTWIDTH] IN BLOOD BY AUTOMATED COUNT: 15.4 % (ref 11.5–14.5)
ERYTHROCYTE [DISTWIDTH] IN BLOOD BY AUTOMATED COUNT: 15.4 % (ref 11.5–14.5)
ERYTHROCYTE [DISTWIDTH] IN BLOOD BY AUTOMATED COUNT: 15.5 % (ref 11.5–14.5)
ERYTHROCYTE [DISTWIDTH] IN BLOOD BY AUTOMATED COUNT: 15.6 % (ref 11.5–14.5)
ERYTHROCYTE [DISTWIDTH] IN BLOOD BY AUTOMATED COUNT: 15.7 % (ref 11.5–14.5)
ERYTHROCYTE [DISTWIDTH] IN BLOOD BY AUTOMATED COUNT: 19.4 % (ref 11.5–14.5)
ERYTHROCYTE [DISTWIDTH] IN BLOOD BY AUTOMATED COUNT: 19.4 % (ref 11.5–14.5)
ERYTHROCYTE [DISTWIDTH] IN BLOOD BY AUTOMATED COUNT: 19.5 % (ref 11.5–14.5)
ERYTHROCYTE [DISTWIDTH] IN BLOOD BY AUTOMATED COUNT: 19.8 % (ref 11.5–14.5)
ERYTHROCYTE [DISTWIDTH] IN BLOOD BY AUTOMATED COUNT: 19.9 % (ref 11.5–14.5)
ERYTHROCYTE [DISTWIDTH] IN BLOOD BY AUTOMATED COUNT: 20.1 % (ref 11.5–14.5)
ERYTHROCYTE [DISTWIDTH] IN BLOOD BY AUTOMATED COUNT: 20.2 % (ref 11.5–14.5)
ERYTHROCYTE [DISTWIDTH] IN BLOOD BY AUTOMATED COUNT: 21.9 % (ref 11.5–14.5)
EST. GFR  (AFRICAN AMERICAN): >60 ML/MIN/1.73 M^2
EST. GFR  (NON AFRICAN AMERICAN): >60 ML/MIN/1.73 M^2
FLUAV AG NPH QL: NEGATIVE
FLUBV AG NPH QL: NEGATIVE
GLUCOSE SERPL-MCNC: 100 MG/DL (ref 70–110)
GLUCOSE SERPL-MCNC: 101 MG/DL (ref 70–110)
GLUCOSE SERPL-MCNC: 101 MG/DL (ref 70–110)
GLUCOSE SERPL-MCNC: 102 MG/DL (ref 70–110)
GLUCOSE SERPL-MCNC: 102 MG/DL (ref 70–110)
GLUCOSE SERPL-MCNC: 104 MG/DL (ref 70–110)
GLUCOSE SERPL-MCNC: 113 MG/DL (ref 70–110)
GLUCOSE SERPL-MCNC: 116 MG/DL (ref 70–110)
GLUCOSE SERPL-MCNC: 118 MG/DL (ref 70–110)
GLUCOSE SERPL-MCNC: 127 MG/DL (ref 70–110)
GLUCOSE SERPL-MCNC: 129 MG/DL (ref 70–110)
GLUCOSE SERPL-MCNC: 136 MG/DL (ref 70–110)
GLUCOSE SERPL-MCNC: 165 MG/DL (ref 70–110)
GLUCOSE SERPL-MCNC: 97 MG/DL (ref 70–110)
GLUCOSE UR QL STRIP: NEGATIVE
GLUCOSE UR QL STRIP: NEGATIVE
HCT VFR BLD AUTO: 26.3 % (ref 40–54)
HCT VFR BLD AUTO: 30.4 % (ref 40–54)
HCT VFR BLD AUTO: 31.8 % (ref 40–54)
HCT VFR BLD AUTO: 32.2 % (ref 40–54)
HCT VFR BLD AUTO: 33.1 % (ref 40–54)
HCT VFR BLD AUTO: 33.3 % (ref 40–54)
HCT VFR BLD AUTO: 35.1 % (ref 40–54)
HCT VFR BLD AUTO: 37.2 % (ref 40–54)
HCT VFR BLD AUTO: 37.8 % (ref 40–54)
HCT VFR BLD AUTO: 37.9 % (ref 40–54)
HCT VFR BLD AUTO: 38.7 % (ref 40–54)
HCT VFR BLD AUTO: 39.6 % (ref 40–54)
HCT VFR BLD AUTO: 39.8 % (ref 40–54)
HCT VFR BLD AUTO: 40 % (ref 40–54)
HCT VFR BLD AUTO: 40.9 % (ref 40–54)
HGB BLD-MCNC: 10.1 G/DL (ref 14–18)
HGB BLD-MCNC: 10.3 G/DL (ref 14–18)
HGB BLD-MCNC: 10.4 G/DL (ref 14–18)
HGB BLD-MCNC: 10.6 G/DL (ref 14–18)
HGB BLD-MCNC: 11.2 G/DL (ref 14–18)
HGB BLD-MCNC: 11.6 G/DL (ref 14–18)
HGB BLD-MCNC: 11.8 G/DL (ref 14–18)
HGB BLD-MCNC: 11.9 G/DL (ref 14–18)
HGB BLD-MCNC: 12.3 G/DL (ref 14–18)
HGB BLD-MCNC: 12.4 G/DL (ref 14–18)
HGB BLD-MCNC: 12.5 G/DL (ref 14–18)
HGB BLD-MCNC: 12.6 G/DL (ref 14–18)
HGB BLD-MCNC: 12.6 G/DL (ref 14–18)
HGB BLD-MCNC: 7.9 G/DL (ref 14–18)
HGB BLD-MCNC: 9.1 G/DL (ref 14–18)
HGB UR QL STRIP: NEGATIVE
HGB UR QL STRIP: NEGATIVE
HYPOCHROMIA BLD QL SMEAR: ABNORMAL
IMM GRANULOCYTES # BLD AUTO: 0.03 K/UL (ref 0–0.04)
IMM GRANULOCYTES # BLD AUTO: 0.04 K/UL (ref 0–0.04)
IMM GRANULOCYTES # BLD AUTO: 0.06 K/UL (ref 0–0.04)
IMM GRANULOCYTES # BLD AUTO: 0.06 K/UL (ref 0–0.04)
IMM GRANULOCYTES # BLD AUTO: 0.07 K/UL (ref 0–0.04)
IMM GRANULOCYTES # BLD AUTO: 0.09 K/UL (ref 0–0.04)
IMM GRANULOCYTES # BLD AUTO: 0.14 K/UL (ref 0–0.04)
IMM GRANULOCYTES # BLD AUTO: 0.16 K/UL (ref 0–0.04)
IMM GRANULOCYTES # BLD AUTO: 0.18 K/UL (ref 0–0.04)
IMM GRANULOCYTES # BLD AUTO: 0.24 K/UL (ref 0–0.04)
IMM GRANULOCYTES # BLD AUTO: 0.54 K/UL (ref 0–0.04)
IMM GRANULOCYTES # BLD AUTO: 0.59 K/UL (ref 0–0.04)
IMM GRANULOCYTES # BLD AUTO: ABNORMAL K/UL (ref 0–0.04)
IMM GRANULOCYTES NFR BLD AUTO: 0.6 % (ref 0–0.5)
IMM GRANULOCYTES NFR BLD AUTO: 1.7 % (ref 0–0.5)
IMM GRANULOCYTES NFR BLD AUTO: 1.9 % (ref 0–0.5)
IMM GRANULOCYTES NFR BLD AUTO: 2.4 % (ref 0–0.5)
IMM GRANULOCYTES NFR BLD AUTO: ABNORMAL % (ref 0–0.5)
INFLUENZA A, MOLECULAR: NEGATIVE
INFLUENZA B, MOLECULAR: NEGATIVE
KETONES UR QL STRIP: ABNORMAL
KETONES UR QL STRIP: NEGATIVE
LACTATE SERPL-SCNC: 0.8 MMOL/L (ref 0.5–2.2)
LEUKOCYTE ESTERASE UR QL STRIP: NEGATIVE
LEUKOCYTE ESTERASE UR QL STRIP: NEGATIVE
LIPASE SERPL-CCNC: 10 U/L (ref 4–60)
LYMPHOCYTES # BLD AUTO: 1 K/UL (ref 1–4.8)
LYMPHOCYTES # BLD AUTO: 1 K/UL (ref 1–4.8)
LYMPHOCYTES # BLD AUTO: 1.1 K/UL (ref 1–4.8)
LYMPHOCYTES # BLD AUTO: 1.2 K/UL (ref 1–4.8)
LYMPHOCYTES NFR BLD: 11.3 % (ref 18–48)
LYMPHOCYTES NFR BLD: 11.3 % (ref 18–48)
LYMPHOCYTES NFR BLD: 12.7 % (ref 18–48)
LYMPHOCYTES NFR BLD: 20 % (ref 18–48)
LYMPHOCYTES NFR BLD: 22.3 % (ref 18–48)
MAGNESIUM SERPL-MCNC: 2 MG/DL (ref 1.6–2.6)
MAGNESIUM SERPL-MCNC: 2.1 MG/DL (ref 1.6–2.6)
MAGNESIUM SERPL-MCNC: 2.1 MG/DL (ref 1.6–2.6)
MCH RBC QN AUTO: 24.4 PG (ref 27–31)
MCH RBC QN AUTO: 24.7 PG (ref 27–31)
MCH RBC QN AUTO: 24.8 PG (ref 27–31)
MCH RBC QN AUTO: 24.9 PG (ref 27–31)
MCH RBC QN AUTO: 24.9 PG (ref 27–31)
MCH RBC QN AUTO: 25 PG (ref 27–31)
MCH RBC QN AUTO: 25.2 PG (ref 27–31)
MCH RBC QN AUTO: 25.5 PG (ref 27–31)
MCH RBC QN AUTO: 25.7 PG (ref 27–31)
MCH RBC QN AUTO: 26 PG (ref 27–31)
MCH RBC QN AUTO: 26 PG (ref 27–31)
MCH RBC QN AUTO: 26.3 PG (ref 27–31)
MCH RBC QN AUTO: 26.4 PG (ref 27–31)
MCH RBC QN AUTO: 26.4 PG (ref 27–31)
MCH RBC QN AUTO: 28.7 PG (ref 27–31)
MCHC RBC AUTO-ENTMCNC: 29.9 G/DL (ref 32–36)
MCHC RBC AUTO-ENTMCNC: 30 G/DL (ref 32–36)
MCHC RBC AUTO-ENTMCNC: 30.8 G/DL (ref 32–36)
MCHC RBC AUTO-ENTMCNC: 31 G/DL (ref 32–36)
MCHC RBC AUTO-ENTMCNC: 31.1 G/DL (ref 32–36)
MCHC RBC AUTO-ENTMCNC: 31.2 G/DL (ref 32–36)
MCHC RBC AUTO-ENTMCNC: 31.5 G/DL (ref 32–36)
MCHC RBC AUTO-ENTMCNC: 31.7 G/DL (ref 32–36)
MCHC RBC AUTO-ENTMCNC: 31.8 G/DL (ref 32–36)
MCHC RBC AUTO-ENTMCNC: 31.8 G/DL (ref 32–36)
MCHC RBC AUTO-ENTMCNC: 31.9 G/DL (ref 32–36)
MCHC RBC AUTO-ENTMCNC: 32.3 G/DL (ref 32–36)
MCHC RBC AUTO-ENTMCNC: 32.3 G/DL (ref 32–36)
MCV RBC AUTO: 77 FL (ref 82–98)
MCV RBC AUTO: 78 FL (ref 82–98)
MCV RBC AUTO: 78 FL (ref 82–98)
MCV RBC AUTO: 79 FL (ref 82–98)
MCV RBC AUTO: 80 FL (ref 82–98)
MCV RBC AUTO: 80 FL (ref 82–98)
MCV RBC AUTO: 81 FL (ref 82–98)
MCV RBC AUTO: 81 FL (ref 82–98)
MCV RBC AUTO: 83 FL (ref 82–98)
MCV RBC AUTO: 84 FL (ref 82–98)
MCV RBC AUTO: 85 FL (ref 82–98)
MCV RBC AUTO: 85 FL (ref 82–98)
MCV RBC AUTO: 96 FL (ref 82–98)
METAMYELOCYTES NFR BLD MANUAL: 3 %
MONOCYTES # BLD AUTO: 0.5 K/UL (ref 0.3–1)
MONOCYTES # BLD AUTO: 0.8 K/UL (ref 0.3–1)
MONOCYTES # BLD AUTO: 0.8 K/UL (ref 0.3–1)
MONOCYTES # BLD AUTO: 0.9 K/UL (ref 0.3–1)
MONOCYTES NFR BLD: 11 % (ref 4–15)
MONOCYTES NFR BLD: 8.9 % (ref 4–15)
MONOCYTES NFR BLD: 8.9 % (ref 4–15)
MONOCYTES NFR BLD: 9 % (ref 4–15)
MONOCYTES NFR BLD: 9.6 % (ref 4–15)
MYELOCYTES NFR BLD MANUAL: 2 %
NEUTROPHILS # BLD AUTO: 2.9 K/UL (ref 1.8–7.7)
NEUTROPHILS # BLD AUTO: 3.9 K/UL (ref 1.8–7.7)
NEUTROPHILS # BLD AUTO: 4.2 K/UL (ref 1.8–7.7)
NEUTROPHILS # BLD AUTO: 5.1 K/UL (ref 1.8–7.7)
NEUTROPHILS # BLD AUTO: 5.1 K/UL (ref 1.8–7.7)
NEUTROPHILS # BLD AUTO: 5.2 K/UL (ref 1.8–7.7)
NEUTROPHILS # BLD AUTO: 6.3 K/UL (ref 1.8–7.7)
NEUTROPHILS # BLD AUTO: 6.4 K/UL (ref 1.8–7.7)
NEUTROPHILS # BLD AUTO: 6.5 K/UL (ref 1.8–7.7)
NEUTROPHILS # BLD AUTO: 6.6 K/UL (ref 1.8–7.7)
NEUTROPHILS # BLD AUTO: 6.7 K/UL (ref 1.8–7.7)
NEUTROPHILS # BLD AUTO: 7.1 K/UL (ref 1.8–7.7)
NEUTROPHILS # BLD AUTO: 7.4 K/UL (ref 1.8–7.7)
NEUTROPHILS # BLD AUTO: 7.7 K/UL (ref 1.8–7.7)
NEUTROPHILS NFR BLD: 60 % (ref 38–73)
NEUTROPHILS NFR BLD: 62.9 % (ref 38–73)
NEUTROPHILS NFR BLD: 75.3 % (ref 38–73)
NEUTROPHILS NFR BLD: 76.6 % (ref 38–73)
NEUTROPHILS NFR BLD: 76.9 % (ref 38–73)
NEUTS BAND NFR BLD MANUAL: 3 %
NITRITE UR QL STRIP: NEGATIVE
NITRITE UR QL STRIP: NEGATIVE
NRBC BLD-RTO: 0 /100 WBC
NRBC BLD-RTO: 10 /100 WBC
OVALOCYTES BLD QL SMEAR: ABNORMAL
PH UR STRIP: 5 [PH] (ref 5–8)
PH UR STRIP: 7 [PH] (ref 5–8)
PHOSPHATE SERPL-MCNC: 1.2 MG/DL (ref 2.7–4.5)
PHOSPHATE SERPL-MCNC: 1.3 MG/DL (ref 2.7–4.5)
PHOSPHATE SERPL-MCNC: 2 MG/DL (ref 2.7–4.5)
PLATELET # BLD AUTO: 189 K/UL (ref 150–350)
PLATELET # BLD AUTO: 210 K/UL (ref 150–350)
PLATELET # BLD AUTO: 250 K/UL (ref 150–350)
PLATELET # BLD AUTO: 253 K/UL (ref 150–350)
PLATELET # BLD AUTO: 277 K/UL (ref 150–350)
PLATELET # BLD AUTO: 278 K/UL (ref 150–350)
PLATELET # BLD AUTO: 283 K/UL (ref 150–350)
PLATELET # BLD AUTO: 291 K/UL (ref 150–350)
PLATELET # BLD AUTO: 291 K/UL (ref 150–350)
PLATELET # BLD AUTO: 299 K/UL (ref 150–350)
PLATELET # BLD AUTO: 346 K/UL (ref 150–350)
PLATELET # BLD AUTO: 364 K/UL (ref 150–350)
PLATELET # BLD AUTO: 406 K/UL (ref 150–350)
PLATELET # BLD AUTO: 43 K/UL (ref 150–350)
PLATELET # BLD AUTO: 457 K/UL (ref 150–350)
PLATELET BLD QL SMEAR: ABNORMAL
PMV BLD AUTO: 10.2 FL (ref 9.2–12.9)
PMV BLD AUTO: 8.4 FL (ref 9.2–12.9)
PMV BLD AUTO: 8.6 FL (ref 9.2–12.9)
PMV BLD AUTO: 8.6 FL (ref 9.2–12.9)
PMV BLD AUTO: 8.7 FL (ref 9.2–12.9)
PMV BLD AUTO: 8.9 FL (ref 9.2–12.9)
PMV BLD AUTO: 8.9 FL (ref 9.2–12.9)
PMV BLD AUTO: 9.1 FL (ref 9.2–12.9)
PMV BLD AUTO: 9.2 FL (ref 9.2–12.9)
PMV BLD AUTO: 9.4 FL (ref 9.2–12.9)
PMV BLD AUTO: 9.5 FL (ref 9.2–12.9)
PMV BLD AUTO: 9.6 FL (ref 9.2–12.9)
PMV BLD AUTO: 9.6 FL (ref 9.2–12.9)
PMV BLD AUTO: 9.7 FL (ref 9.2–12.9)
PMV BLD AUTO: ABNORMAL FL (ref 9.2–12.9)
POIKILOCYTOSIS BLD QL SMEAR: SLIGHT
POLYCHROMASIA BLD QL SMEAR: ABNORMAL
POTASSIUM SERPL-SCNC: 3.5 MMOL/L (ref 3.5–5.1)
POTASSIUM SERPL-SCNC: 3.8 MMOL/L (ref 3.5–5.1)
POTASSIUM SERPL-SCNC: 3.9 MMOL/L (ref 3.5–5.1)
POTASSIUM SERPL-SCNC: 4 MMOL/L (ref 3.5–5.1)
POTASSIUM SERPL-SCNC: 4.1 MMOL/L (ref 3.5–5.1)
POTASSIUM SERPL-SCNC: 4.2 MMOL/L (ref 3.5–5.1)
POTASSIUM SERPL-SCNC: 4.3 MMOL/L (ref 3.5–5.1)
POTASSIUM SERPL-SCNC: 4.6 MMOL/L (ref 3.5–5.1)
PROT SERPL-MCNC: 6.4 G/DL (ref 6–8.4)
PROT SERPL-MCNC: 6.5 G/DL (ref 6–8.4)
PROT SERPL-MCNC: 6.6 G/DL (ref 6–8.4)
PROT SERPL-MCNC: 6.7 G/DL (ref 6–8.4)
PROT SERPL-MCNC: 6.8 G/DL (ref 6–8.4)
PROT SERPL-MCNC: 6.9 G/DL (ref 6–8.4)
PROT SERPL-MCNC: 7.1 G/DL (ref 6–8.4)
PROT SERPL-MCNC: 7.1 G/DL (ref 6–8.4)
PROT UR QL STRIP: NEGATIVE
PROT UR QL STRIP: NEGATIVE
RBC # BLD AUTO: 2.75 M/UL (ref 4.6–6.2)
RBC # BLD AUTO: 3.64 M/UL (ref 4.6–6.2)
RBC # BLD AUTO: 4.06 M/UL (ref 4.6–6.2)
RBC # BLD AUTO: 4.15 M/UL (ref 4.6–6.2)
RBC # BLD AUTO: 4.15 M/UL (ref 4.6–6.2)
RBC # BLD AUTO: 4.17 M/UL (ref 4.6–6.2)
RBC # BLD AUTO: 4.53 M/UL (ref 4.6–6.2)
RBC # BLD AUTO: 4.57 M/UL (ref 4.6–6.2)
RBC # BLD AUTO: 4.6 M/UL (ref 4.6–6.2)
RBC # BLD AUTO: 4.69 M/UL (ref 4.6–6.2)
RBC # BLD AUTO: 4.76 M/UL (ref 4.6–6.2)
RBC # BLD AUTO: 4.77 M/UL (ref 4.6–6.2)
RBC # BLD AUTO: 4.78 M/UL (ref 4.6–6.2)
RBC # BLD AUTO: 4.83 M/UL (ref 4.6–6.2)
RBC # BLD AUTO: 4.84 M/UL (ref 4.6–6.2)
SARS-COV-2 RDRP RESP QL NAA+PROBE: NEGATIVE
SARS-COV-2 RNA RESP QL NAA+PROBE: NOT DETECTED
SARS-COV-2 RNA RESP QL NAA+PROBE: NOT DETECTED
SODIUM SERPL-SCNC: 134 MMOL/L (ref 136–145)
SODIUM SERPL-SCNC: 135 MMOL/L (ref 136–145)
SODIUM SERPL-SCNC: 135 MMOL/L (ref 136–145)
SODIUM SERPL-SCNC: 136 MMOL/L (ref 136–145)
SODIUM SERPL-SCNC: 137 MMOL/L (ref 136–145)
SODIUM SERPL-SCNC: 138 MMOL/L (ref 136–145)
SODIUM SERPL-SCNC: 139 MMOL/L (ref 136–145)
SODIUM SERPL-SCNC: 139 MMOL/L (ref 136–145)
SP GR UR STRIP: 1.01 (ref 1–1.03)
SP GR UR STRIP: 1.01 (ref 1–1.03)
SPECIMEN SOURCE: NORMAL
URN SPEC COLLECT METH UR: ABNORMAL
URN SPEC COLLECT METH UR: NORMAL
WBC # BLD AUTO: 4.45 K/UL (ref 3.9–12.7)
WBC # BLD AUTO: 4.62 K/UL (ref 3.9–12.7)
WBC # BLD AUTO: 6.15 K/UL (ref 3.9–12.7)
WBC # BLD AUTO: 6.41 K/UL (ref 3.9–12.7)
WBC # BLD AUTO: 7.23 K/UL (ref 3.9–12.7)
WBC # BLD AUTO: 7.59 K/UL (ref 3.9–12.7)
WBC # BLD AUTO: 7.72 K/UL (ref 3.9–12.7)
WBC # BLD AUTO: 8.44 K/UL (ref 3.9–12.7)
WBC # BLD AUTO: 8.8 K/UL (ref 3.9–12.7)
WBC # BLD AUTO: 9 K/UL (ref 3.9–12.7)
WBC # BLD AUTO: 9.22 K/UL (ref 3.9–12.7)
WBC # BLD AUTO: 9.28 K/UL (ref 3.9–12.7)
WBC # BLD AUTO: 9.36 K/UL (ref 3.9–12.7)
WBC # BLD AUTO: 9.51 K/UL (ref 3.9–12.7)
WBC # BLD AUTO: 9.96 K/UL (ref 3.9–12.7)

## 2020-01-01 PROCEDURE — 73502 X-RAY EXAM HIP UNI 2-3 VIEWS: CPT | Mod: 26,RT,, | Performed by: RADIOLOGY

## 2020-01-01 PROCEDURE — 99285 PR EMERGENCY DEPT VISIT,LEVEL V: ICD-10-PCS | Mod: GV,,, | Performed by: EMERGENCY MEDICINE

## 2020-01-01 PROCEDURE — 25000003 PHARM REV CODE 250: Performed by: STUDENT IN AN ORGANIZED HEALTH CARE EDUCATION/TRAINING PROGRAM

## 2020-01-01 PROCEDURE — 83690 ASSAY OF LIPASE: CPT

## 2020-01-01 PROCEDURE — 99214 OFFICE O/P EST MOD 30 MIN: CPT | Mod: 25,S$GLB,, | Performed by: FAMILY MEDICINE

## 2020-01-01 PROCEDURE — 77300 RADIATION THERAPY DOSE PLAN: CPT | Mod: 26,,, | Performed by: RADIOLOGY

## 2020-01-01 PROCEDURE — 77300 RADIATION THERAPY DOSE PLAN: CPT | Mod: TC | Performed by: RADIOLOGY

## 2020-01-01 PROCEDURE — 96372 THER/PROPH/DIAG INJ SC/IM: CPT

## 2020-01-01 PROCEDURE — 81003 URINALYSIS AUTO W/O SCOPE: CPT

## 2020-01-01 PROCEDURE — 77334 RADIATION TREATMENT AID(S): CPT | Mod: TC | Performed by: RADIOLOGY

## 2020-01-01 PROCEDURE — 99999 PR PBB SHADOW E&M-EST. PATIENT-LVL III: CPT | Mod: PBBFAC,,, | Performed by: NURSE PRACTITIONER

## 2020-01-01 PROCEDURE — 84100 ASSAY OF PHOSPHORUS: CPT

## 2020-01-01 PROCEDURE — 72158 MRI LUMBAR SPINE W/O & W/DYE: CPT | Mod: TC

## 2020-01-01 PROCEDURE — 77295 PR 3D RADIOTHERAPY PLAN: ICD-10-PCS | Mod: 26,,, | Performed by: RADIOLOGY

## 2020-01-01 PROCEDURE — 99999 PR PBB SHADOW E&M-EST. PATIENT-LVL III: ICD-10-PCS | Mod: PBBFAC,,, | Performed by: RADIOLOGY

## 2020-01-01 PROCEDURE — 77290 THER RAD SIMULAJ FIELD CPLX: CPT | Mod: 26,,, | Performed by: RADIOLOGY

## 2020-01-01 PROCEDURE — 77387 GUIDANCE FOR RADJ TX DLVR: CPT | Mod: TC | Performed by: RADIOLOGY

## 2020-01-01 PROCEDURE — 99214 OFFICE O/P EST MOD 30 MIN: CPT | Mod: S$GLB,,, | Performed by: INTERNAL MEDICINE

## 2020-01-01 PROCEDURE — 72100 XR LUMBAR SPINE 2 OR 3 VIEWS: ICD-10-PCS | Mod: 26,,, | Performed by: RADIOLOGY

## 2020-01-01 PROCEDURE — 99999 PR PBB SHADOW E&M-EST. PATIENT-LVL IV: CPT | Mod: PBBFAC,,, | Performed by: RADIOLOGY

## 2020-01-01 PROCEDURE — 63600175 PHARM REV CODE 636 W HCPCS: Mod: JG | Performed by: INTERNAL MEDICINE

## 2020-01-01 PROCEDURE — 36415 COLL VENOUS BLD VENIPUNCTURE: CPT

## 2020-01-01 PROCEDURE — 72197 MRI PELVIS W/O & W/DYE: CPT | Mod: TC

## 2020-01-01 PROCEDURE — 99999 PR PBB SHADOW E&M-EST. PATIENT-LVL IV: CPT | Mod: PBBFAC,,, | Performed by: INTERNAL MEDICINE

## 2020-01-01 PROCEDURE — 99285 PR EMERGENCY DEPT VISIT,LEVEL V: ICD-10-PCS | Mod: ,,, | Performed by: EMERGENCY MEDICINE

## 2020-01-01 PROCEDURE — 83735 ASSAY OF MAGNESIUM: CPT

## 2020-01-01 PROCEDURE — 99999 PR PBB SHADOW E&M-EST. PATIENT-LVL IV: ICD-10-PCS | Mod: PBBFAC,,, | Performed by: NURSE PRACTITIONER

## 2020-01-01 PROCEDURE — 85027 COMPLETE CBC AUTOMATED: CPT

## 2020-01-01 PROCEDURE — 3008F BODY MASS INDEX DOCD: CPT | Mod: CPTII,S$GLB,, | Performed by: RADIOLOGY

## 2020-01-01 PROCEDURE — 77412 RADIATION TX DELIVERY LVL 3: CPT | Performed by: RADIOLOGY

## 2020-01-01 PROCEDURE — 99999 PR PBB SHADOW E&M-EST. PATIENT-LVL III: CPT | Mod: PBBFAC,,, | Performed by: INTERNAL MEDICINE

## 2020-01-01 PROCEDURE — 72170 X-RAY EXAM OF PELVIS: CPT | Mod: TC

## 2020-01-01 PROCEDURE — 97802 MEDICAL NUTRITION INDIV IN: CPT | Mod: S$GLB,,, | Performed by: DIETITIAN, REGISTERED

## 2020-01-01 PROCEDURE — 99285 EMERGENCY DEPT VISIT HI MDM: CPT | Mod: GV,,, | Performed by: EMERGENCY MEDICINE

## 2020-01-01 PROCEDURE — 70553 MRI BRAIN STEM W/O & W/DYE: CPT | Mod: 26,,, | Performed by: RADIOLOGY

## 2020-01-01 PROCEDURE — 3008F BODY MASS INDEX DOCD: CPT | Mod: CPTII,S$GLB,, | Performed by: NURSE PRACTITIONER

## 2020-01-01 PROCEDURE — G6002 PR STEREOSCOPIC XRAY GUIDE FOR RADIATION TX DELIV: ICD-10-PCS | Mod: 26,,, | Performed by: RADIOLOGY

## 2020-01-01 PROCEDURE — 77263 THER RADIOLOGY TX PLNG CPLX: CPT | Mod: ,,, | Performed by: RADIOLOGY

## 2020-01-01 PROCEDURE — 77295 3-D RADIOTHERAPY PLAN: CPT | Mod: TC | Performed by: RADIOLOGY

## 2020-01-01 PROCEDURE — 99214 PR OFFICE/OUTPT VISIT, EST, LEVL IV, 30-39 MIN: ICD-10-PCS | Mod: 95,,, | Performed by: NURSE PRACTITIONER

## 2020-01-01 PROCEDURE — 77290 THER RAD SIMULAJ FIELD CPLX: CPT | Mod: TC | Performed by: RADIOLOGY

## 2020-01-01 PROCEDURE — 77417 THER RADIOLOGY PORT IMAGE(S): CPT | Performed by: RADIOLOGY

## 2020-01-01 PROCEDURE — 72158 MRI LUMBAR SPINE W WO CONTRAST: ICD-10-PCS | Mod: 26,,, | Performed by: RADIOLOGY

## 2020-01-01 PROCEDURE — 3008F PR BODY MASS INDEX (BMI) DOCUMENTED: ICD-10-PCS | Mod: CPTII,S$GLB,, | Performed by: INTERNAL MEDICINE

## 2020-01-01 PROCEDURE — 87502 INFLUENZA DNA AMP PROBE: CPT

## 2020-01-01 PROCEDURE — 99350 PR HOME VISIT,ESTAB PATIENT,LEVEL IV: ICD-10-PCS | Mod: S$GLB,,, | Performed by: NURSE PRACTITIONER

## 2020-01-01 PROCEDURE — 3008F PR BODY MASS INDEX (BMI) DOCUMENTED: ICD-10-PCS | Mod: CPTII,S$GLB,, | Performed by: RADIOLOGY

## 2020-01-01 PROCEDURE — 70553 MRI BRAIN STEM W/O & W/DYE: CPT | Mod: TC

## 2020-01-01 PROCEDURE — 87804 POCT INFLUENZA A/B: ICD-10-PCS | Mod: 59,QW,S$GLB, | Performed by: FAMILY MEDICINE

## 2020-01-01 PROCEDURE — 84153 ASSAY OF PSA TOTAL: CPT

## 2020-01-01 PROCEDURE — 77334 PR  RADN TREATMENT AID(S) COMPLX: ICD-10-PCS | Mod: 26,,, | Performed by: RADIOLOGY

## 2020-01-01 PROCEDURE — 63600175 PHARM REV CODE 636 W HCPCS: Performed by: STUDENT IN AN ORGANIZED HEALTH CARE EDUCATION/TRAINING PROGRAM

## 2020-01-01 PROCEDURE — U0002 COVID-19 LAB TEST NON-CDC: HCPCS

## 2020-01-01 PROCEDURE — A9585 GADOBUTROL INJECTION: HCPCS | Performed by: NURSE PRACTITIONER

## 2020-01-01 PROCEDURE — 3008F PR BODY MASS INDEX (BMI) DOCUMENTED: ICD-10-PCS | Mod: CPTII,S$GLB,, | Performed by: NURSE PRACTITIONER

## 2020-01-01 PROCEDURE — G6002 STEREOSCOPIC X-RAY GUIDANCE: HCPCS | Mod: 26,,, | Performed by: RADIOLOGY

## 2020-01-01 PROCEDURE — 97165 OT EVAL LOW COMPLEX 30 MIN: CPT

## 2020-01-01 PROCEDURE — 80053 COMPREHEN METABOLIC PANEL: CPT

## 2020-01-01 PROCEDURE — 25500020 PHARM REV CODE 255: Performed by: EMERGENCY MEDICINE

## 2020-01-01 PROCEDURE — 99214 OFFICE O/P EST MOD 30 MIN: CPT | Mod: 95,,, | Performed by: NURSE PRACTITIONER

## 2020-01-01 PROCEDURE — 77263 PR  RADIATION THERAPY PLAN COMPLEX: ICD-10-PCS | Mod: ,,, | Performed by: RADIOLOGY

## 2020-01-01 PROCEDURE — 99213 PR OFFICE/OUTPT VISIT, EST, LEVL III, 20-29 MIN: ICD-10-PCS | Mod: 95,,, | Performed by: INTERNAL MEDICINE

## 2020-01-01 PROCEDURE — 96374 THER/PROPH/DIAG INJ IV PUSH: CPT | Mod: 59

## 2020-01-01 PROCEDURE — 77334 RADIATION TREATMENT AID(S): CPT | Mod: 26,,, | Performed by: RADIOLOGY

## 2020-01-01 PROCEDURE — 74177 CT ABDOMEN PELVIS WITH CONTRAST: ICD-10-PCS | Mod: 26,,, | Performed by: RADIOLOGY

## 2020-01-01 PROCEDURE — 99999 PR PBB SHADOW E&M-EST. PATIENT-LVL III: ICD-10-PCS | Mod: PBBFAC,,, | Performed by: NURSE PRACTITIONER

## 2020-01-01 PROCEDURE — 99285 EMERGENCY DEPT VISIT HI MDM: CPT | Mod: ,,, | Performed by: EMERGENCY MEDICINE

## 2020-01-01 PROCEDURE — 79101 NUCLEAR RX IV ADMIN: CPT | Mod: 26,,, | Performed by: RADIOLOGY

## 2020-01-01 PROCEDURE — 79101 NUCLEAR RX IV ADMIN: CPT | Mod: TC

## 2020-01-01 PROCEDURE — G0379 DIRECT REFER HOSPITAL OBSERV: HCPCS

## 2020-01-01 PROCEDURE — 99999 PR PBB SHADOW E&M-EST. PATIENT-LVL II: CPT | Mod: PBBFAC,,, | Performed by: DIETITIAN, REGISTERED

## 2020-01-01 PROCEDURE — 72100 X-RAY EXAM L-S SPINE 2/3 VWS: CPT | Mod: 26,,, | Performed by: RADIOLOGY

## 2020-01-01 PROCEDURE — 85025 COMPLETE CBC W/AUTO DIFF WBC: CPT

## 2020-01-01 PROCEDURE — 99285 EMERGENCY DEPT VISIT HI MDM: CPT | Mod: 25

## 2020-01-01 PROCEDURE — 77014 HC CT GUIDANCE RADIATION THERAPY FLDS PLACEMENT: CPT | Mod: TC | Performed by: RADIOLOGY

## 2020-01-01 PROCEDURE — 99214 PR OFFICE/OUTPT VISIT, EST, LEVL IV, 30-39 MIN: ICD-10-PCS | Mod: S$GLB,,, | Performed by: INTERNAL MEDICINE

## 2020-01-01 PROCEDURE — 72197 MRI PELVIS W/O & W/DYE: CPT | Mod: 26,,, | Performed by: RADIOLOGY

## 2020-01-01 PROCEDURE — 77295 3-D RADIOTHERAPY PLAN: CPT | Mod: 26,,, | Performed by: RADIOLOGY

## 2020-01-01 PROCEDURE — 20600001 HC STEP DOWN PRIVATE ROOM

## 2020-01-01 PROCEDURE — 99999 PR PBB SHADOW E&M-EST. PATIENT-LVL III: CPT | Mod: PBBFAC,,, | Performed by: RADIOLOGY

## 2020-01-01 PROCEDURE — 77290 PR  SET RADN THERAPY FIELD COMPLEX: ICD-10-PCS | Mod: 26,,, | Performed by: RADIOLOGY

## 2020-01-01 PROCEDURE — 99999 PR PBB SHADOW E&M-EST. PATIENT-LVL IV: ICD-10-PCS | Mod: PBBFAC,,, | Performed by: RADIOLOGY

## 2020-01-01 PROCEDURE — 25500020 PHARM REV CODE 255: Performed by: INTERNAL MEDICINE

## 2020-01-01 PROCEDURE — 74177 CT ABD & PELVIS W/CONTRAST: CPT | Mod: 26,,, | Performed by: RADIOLOGY

## 2020-01-01 PROCEDURE — 99214 PR OFFICE/OUTPT VISIT, EST, LEVL IV, 30-39 MIN: ICD-10-PCS | Mod: GT,,, | Performed by: INTERNAL MEDICINE

## 2020-01-01 PROCEDURE — 99214 PR OFFICE/OUTPT VISIT, EST, LEVL IV, 30-39 MIN: ICD-10-PCS | Mod: 25,S$GLB,, | Performed by: RADIOLOGY

## 2020-01-01 PROCEDURE — 99214 PR OFFICE/OUTPT VISIT, EST, LEVL IV, 30-39 MIN: ICD-10-PCS | Mod: 25,S$GLB,, | Performed by: FAMILY MEDICINE

## 2020-01-01 PROCEDURE — G0378 HOSPITAL OBSERVATION PER HR: HCPCS

## 2020-01-01 PROCEDURE — A9585 GADOBUTROL INJECTION: HCPCS | Performed by: INTERNAL MEDICINE

## 2020-01-01 PROCEDURE — 72100 X-RAY EXAM L-S SPINE 2/3 VWS: CPT | Mod: TC

## 2020-01-01 PROCEDURE — 77300 PR RADIATION THERAPY,DOSIMETRY PLAN: ICD-10-PCS | Mod: 26,,, | Performed by: RADIOLOGY

## 2020-01-01 PROCEDURE — 74177 CT ABD & PELVIS W/CONTRAST: CPT | Mod: TC

## 2020-01-01 PROCEDURE — 85007 BL SMEAR W/DIFF WBC COUNT: CPT

## 2020-01-01 PROCEDURE — 73502 XR HIP 2 VIEW RIGHT: ICD-10-PCS | Mod: 26,RT,, | Performed by: RADIOLOGY

## 2020-01-01 PROCEDURE — 99205 OFFICE O/P NEW HI 60 MIN: CPT | Mod: 95,,, | Performed by: EMERGENCY MEDICINE

## 2020-01-01 PROCEDURE — 74183 MRI ABD W/O CNTR FLWD CNTR: CPT | Mod: 26,,, | Performed by: RADIOLOGY

## 2020-01-01 PROCEDURE — 99213 OFFICE O/P EST LOW 20 MIN: CPT | Mod: 95,,, | Performed by: INTERNAL MEDICINE

## 2020-01-01 PROCEDURE — 87804 INFLUENZA ASSAY W/OPTIC: CPT | Mod: QW,S$GLB,, | Performed by: FAMILY MEDICINE

## 2020-01-01 PROCEDURE — 97530 THERAPEUTIC ACTIVITIES: CPT

## 2020-01-01 PROCEDURE — 99999 PR PBB SHADOW E&M-EST. PATIENT-LVL II: ICD-10-PCS | Mod: PBBFAC,,, | Performed by: DIETITIAN, REGISTERED

## 2020-01-01 PROCEDURE — 99238 HOSP IP/OBS DSCHRG MGMT 30/<: CPT | Mod: ,,, | Performed by: INTERNAL MEDICINE

## 2020-01-01 PROCEDURE — 99223 1ST HOSP IP/OBS HIGH 75: CPT | Mod: ,,, | Performed by: INTERNAL MEDICINE

## 2020-01-01 PROCEDURE — 77336 RADIATION PHYSICS CONSULT: CPT | Performed by: RADIOLOGY

## 2020-01-01 PROCEDURE — 72158 MRI LUMBAR SPINE W/O & W/DYE: CPT | Mod: 26,,, | Performed by: RADIOLOGY

## 2020-01-01 PROCEDURE — 99215 PR OFFICE/OUTPT VISIT, EST, LEVL V, 40-54 MIN: ICD-10-PCS | Mod: S$GLB,,, | Performed by: NURSE PRACTITIONER

## 2020-01-01 PROCEDURE — 83605 ASSAY OF LACTIC ACID: CPT

## 2020-01-01 PROCEDURE — 99999 PR PBB SHADOW E&M-EST. PATIENT-LVL III: ICD-10-PCS | Mod: PBBFAC,,, | Performed by: INTERNAL MEDICINE

## 2020-01-01 PROCEDURE — 99999 PR PBB SHADOW E&M-EST. PATIENT-LVL IV: ICD-10-PCS | Mod: PBBFAC,,, | Performed by: INTERNAL MEDICINE

## 2020-01-01 PROCEDURE — 99223 PR INITIAL HOSPITAL CARE,LEVL III: ICD-10-PCS | Mod: ,,, | Performed by: INTERNAL MEDICINE

## 2020-01-01 PROCEDURE — 99213 PR OFFICE/OUTPT VISIT, EST, LEVL III, 20-29 MIN: ICD-10-PCS | Mod: 95,,, | Performed by: NURSE PRACTITIONER

## 2020-01-01 PROCEDURE — 99215 OFFICE O/P EST HI 40 MIN: CPT | Mod: S$GLB,,, | Performed by: NURSE PRACTITIONER

## 2020-01-01 PROCEDURE — 94761 N-INVAS EAR/PLS OXIMETRY MLT: CPT

## 2020-01-01 PROCEDURE — 99214 OFFICE O/P EST MOD 30 MIN: CPT | Mod: GT,,, | Performed by: INTERNAL MEDICINE

## 2020-01-01 PROCEDURE — 3008F BODY MASS INDEX DOCD: CPT | Mod: CPTII,S$GLB,, | Performed by: INTERNAL MEDICINE

## 2020-01-01 PROCEDURE — 74183 MRI ABDOMEN-PELVIS W W/O CONTRAST (XPD): ICD-10-PCS | Mod: 26,,, | Performed by: RADIOLOGY

## 2020-01-01 PROCEDURE — 72170 X-RAY EXAM OF PELVIS: CPT | Mod: 26,,, | Performed by: RADIOLOGY

## 2020-01-01 PROCEDURE — A9606 RADIUM RA223 DICHLORIDE THER: HCPCS | Mod: JG

## 2020-01-01 PROCEDURE — 99497 PR ADVNCD CARE PLAN 30 MIN: ICD-10-PCS | Mod: 95,,, | Performed by: EMERGENCY MEDICINE

## 2020-01-01 PROCEDURE — 99213 OFFICE O/P EST LOW 20 MIN: CPT | Mod: 95,,, | Performed by: NURSE PRACTITIONER

## 2020-01-01 PROCEDURE — 97802 PR MED NUTR THER, 1ST, INDIV, EA 15 MIN: ICD-10-PCS | Mod: S$GLB,,, | Performed by: DIETITIAN, REGISTERED

## 2020-01-01 PROCEDURE — 99497 ADVNCD CARE PLAN 30 MIN: CPT | Mod: 95,,, | Performed by: EMERGENCY MEDICINE

## 2020-01-01 PROCEDURE — 99214 OFFICE O/P EST MOD 30 MIN: CPT | Mod: 25,S$GLB,, | Performed by: RADIOLOGY

## 2020-01-01 PROCEDURE — 99205 PR OFFICE/OUTPT VISIT, NEW, LEVL V, 60-74 MIN: ICD-10-PCS | Mod: 95,,, | Performed by: EMERGENCY MEDICINE

## 2020-01-01 PROCEDURE — 72197 MRI ABDOMEN-PELVIS W W/O CONTRAST (XPD): ICD-10-PCS | Mod: 26,,, | Performed by: RADIOLOGY

## 2020-01-01 PROCEDURE — 25500020 PHARM REV CODE 255: Performed by: NURSE PRACTITIONER

## 2020-01-01 PROCEDURE — 99204 PR OFFICE/OUTPT VISIT, NEW, LEVL IV, 45-59 MIN: ICD-10-PCS | Mod: 25,S$GLB,, | Performed by: RADIOLOGY

## 2020-01-01 PROCEDURE — 99497 ADVNCD CARE PLAN 30 MIN: CPT | Mod: S$GLB,,, | Performed by: NURSE PRACTITIONER

## 2020-01-01 PROCEDURE — 72170 XR PELVIS ROUTINE AP: ICD-10-PCS | Mod: 26,,, | Performed by: RADIOLOGY

## 2020-01-01 PROCEDURE — 99350 HOME/RES VST EST HIGH MDM 60: CPT | Mod: S$GLB,,, | Performed by: NURSE PRACTITIONER

## 2020-01-01 PROCEDURE — 63600175 PHARM REV CODE 636 W HCPCS: Performed by: EMERGENCY MEDICINE

## 2020-01-01 PROCEDURE — 97161 PT EVAL LOW COMPLEX 20 MIN: CPT

## 2020-01-01 PROCEDURE — 99214 OFFICE O/P EST MOD 30 MIN: CPT | Mod: 95,,, | Performed by: INTERNAL MEDICINE

## 2020-01-01 PROCEDURE — 99204 OFFICE O/P NEW MOD 45 MIN: CPT | Mod: 25,S$GLB,, | Performed by: RADIOLOGY

## 2020-01-01 PROCEDURE — 99999 PR PBB SHADOW E&M-EST. PATIENT-LVL IV: CPT | Mod: PBBFAC,,, | Performed by: NURSE PRACTITIONER

## 2020-01-01 PROCEDURE — 99283 EMERGENCY DEPT VISIT LOW MDM: CPT

## 2020-01-01 PROCEDURE — 99497 PR ADVNCD CARE PLAN 30 MIN: ICD-10-PCS | Mod: S$GLB,,, | Performed by: NURSE PRACTITIONER

## 2020-01-01 PROCEDURE — 80048 BASIC METABOLIC PNL TOTAL CA: CPT

## 2020-01-01 PROCEDURE — 99238 PR HOSPITAL DISCHARGE DAY,<30 MIN: ICD-10-PCS | Mod: ,,, | Performed by: INTERNAL MEDICINE

## 2020-01-01 PROCEDURE — 79101 NM THERAPY BY IV ADMINISTRATION XOFIGO: ICD-10-PCS | Mod: 26,,, | Performed by: RADIOLOGY

## 2020-01-01 PROCEDURE — 99214 PR OFFICE/OUTPT VISIT, EST, LEVL IV, 30-39 MIN: ICD-10-PCS | Mod: 95,,, | Performed by: INTERNAL MEDICINE

## 2020-01-01 PROCEDURE — 70553 MRI BRAIN W WO CONTRAST: ICD-10-PCS | Mod: 26,,, | Performed by: RADIOLOGY

## 2020-01-01 PROCEDURE — 73502 X-RAY EXAM HIP UNI 2-3 VIEWS: CPT | Mod: TC,RT

## 2020-01-01 RX ORDER — OXYCODONE HYDROCHLORIDE 5 MG/1
5 TABLET ORAL EVERY 4 HOURS PRN
Status: DISCONTINUED | OUTPATIENT
Start: 2020-01-01 | End: 2020-01-01 | Stop reason: HOSPADM

## 2020-01-01 RX ORDER — MORPHINE SULFATE 15 MG/1
15 TABLET, FILM COATED, EXTENDED RELEASE ORAL EVERY 8 HOURS PRN
Qty: 60 TABLET | Refills: 0 | Status: SHIPPED | OUTPATIENT
Start: 2020-01-01 | End: 2020-01-01 | Stop reason: SDUPTHER

## 2020-01-01 RX ORDER — ONDANSETRON 8 MG/1
8 TABLET, ORALLY DISINTEGRATING ORAL EVERY 8 HOURS PRN
Qty: 30 TABLET | Refills: 2 | Status: SHIPPED | OUTPATIENT
Start: 2020-01-01 | End: 2021-08-28

## 2020-01-01 RX ORDER — FLUTICASONE PROPIONATE 50 MCG
1 SPRAY, SUSPENSION (ML) NASAL DAILY
Qty: 9.9 ML | Refills: 0 | Status: ON HOLD | OUTPATIENT
Start: 2020-01-01 | End: 2020-01-01

## 2020-01-01 RX ORDER — BENZONATATE 100 MG/1
100 CAPSULE ORAL EVERY 6 HOURS PRN
Qty: 30 CAPSULE | Refills: 1 | Status: ON HOLD | OUTPATIENT
Start: 2020-01-01 | End: 2020-01-01

## 2020-01-01 RX ORDER — PRAMIPEXOLE DIHYDROCHLORIDE 0.12 MG/1
0.25 TABLET ORAL NIGHTLY
Status: DISCONTINUED | OUTPATIENT
Start: 2020-01-01 | End: 2020-01-01 | Stop reason: HOSPADM

## 2020-01-01 RX ORDER — GLUCAGON 1 MG
1 KIT INJECTION
Status: DISCONTINUED | OUTPATIENT
Start: 2020-01-01 | End: 2020-01-01 | Stop reason: HOSPADM

## 2020-01-01 RX ORDER — DEXTROSE MONOHYDRATE 100 MG/ML
25 INJECTION, SOLUTION INTRAVENOUS
Status: DISCONTINUED | OUTPATIENT
Start: 2020-01-01 | End: 2020-01-01 | Stop reason: HOSPADM

## 2020-01-01 RX ORDER — IBUPROFEN 200 MG
16 TABLET ORAL
Status: DISCONTINUED | OUTPATIENT
Start: 2020-01-01 | End: 2020-01-01 | Stop reason: HOSPADM

## 2020-01-01 RX ORDER — GADOBUTROL 604.72 MG/ML
10 INJECTION INTRAVENOUS
Status: COMPLETED | OUTPATIENT
Start: 2020-01-01 | End: 2020-01-01

## 2020-01-01 RX ORDER — TRAMADOL HYDROCHLORIDE 50 MG/1
50 TABLET ORAL EVERY 4 HOURS PRN
Status: DISCONTINUED | OUTPATIENT
Start: 2020-01-01 | End: 2020-01-01

## 2020-01-01 RX ORDER — HEPARIN 100 UNIT/ML
500 SYRINGE INTRAVENOUS
Status: CANCELLED | OUTPATIENT
Start: 2020-01-01

## 2020-01-01 RX ORDER — MEGESTROL ACETATE 40 MG/1
40 TABLET ORAL 2 TIMES DAILY
Status: DISCONTINUED | OUTPATIENT
Start: 2020-01-01 | End: 2020-01-01 | Stop reason: HOSPADM

## 2020-01-01 RX ORDER — DRONABINOL 5 MG/1
5 CAPSULE ORAL
Qty: 60 CAPSULE | Refills: 1 | Status: ON HOLD | OUTPATIENT
Start: 2020-01-01 | End: 2020-01-01

## 2020-01-01 RX ORDER — NITROFURANTOIN 25; 75 MG/1; MG/1
100 CAPSULE ORAL 2 TIMES DAILY
Qty: 14 CAPSULE | Refills: 0 | Status: SHIPPED | OUTPATIENT
Start: 2020-01-01 | End: 2020-01-01

## 2020-01-01 RX ORDER — DEXTROSE MONOHYDRATE 100 MG/ML
12.5 INJECTION, SOLUTION INTRAVENOUS
Status: DISCONTINUED | OUTPATIENT
Start: 2020-01-01 | End: 2020-01-01 | Stop reason: HOSPADM

## 2020-01-01 RX ORDER — HYDROCODONE BITARTRATE AND ACETAMINOPHEN 5; 325 MG/1; MG/1
1 TABLET ORAL EVERY 4 HOURS PRN
Qty: 120 TABLET | Refills: 0 | Status: SHIPPED | OUTPATIENT
Start: 2020-01-01

## 2020-01-01 RX ORDER — HYDROCODONE BITARTRATE AND ACETAMINOPHEN 5; 325 MG/1; MG/1
1 TABLET ORAL EVERY 4 HOURS PRN
Qty: 120 TABLET | Refills: 0 | Status: SHIPPED | OUTPATIENT
Start: 2020-01-01 | End: 2020-01-01 | Stop reason: SDUPTHER

## 2020-01-01 RX ORDER — IBUPROFEN 200 MG
24 TABLET ORAL
Status: DISCONTINUED | OUTPATIENT
Start: 2020-01-01 | End: 2020-01-01 | Stop reason: HOSPADM

## 2020-01-01 RX ORDER — FERROUS SULFATE, DRIED 160(50) MG
1 TABLET, EXTENDED RELEASE ORAL 2 TIMES DAILY
Status: DISCONTINUED | OUTPATIENT
Start: 2020-01-01 | End: 2020-01-01 | Stop reason: HOSPADM

## 2020-01-01 RX ORDER — OXYCODONE HYDROCHLORIDE 5 MG/1
5 TABLET ORAL EVERY 4 HOURS PRN
Qty: 180 TABLET | Refills: 0 | Status: SHIPPED | OUTPATIENT
Start: 2020-01-01 | End: 2020-01-01 | Stop reason: SDUPTHER

## 2020-01-01 RX ORDER — PROCHLORPERAZINE EDISYLATE 5 MG/ML
5 INJECTION INTRAMUSCULAR; INTRAVENOUS EVERY 6 HOURS PRN
Status: DISCONTINUED | OUTPATIENT
Start: 2020-01-01 | End: 2020-01-01 | Stop reason: HOSPADM

## 2020-01-01 RX ORDER — ENOXAPARIN SODIUM 100 MG/ML
40 INJECTION SUBCUTANEOUS EVERY 24 HOURS
Status: DISCONTINUED | OUTPATIENT
Start: 2020-01-01 | End: 2020-01-01 | Stop reason: HOSPADM

## 2020-01-01 RX ORDER — MEGESTROL ACETATE 40 MG/ML
400 SUSPENSION ORAL DAILY
Qty: 240 ML | Refills: 2 | Status: SHIPPED | OUTPATIENT
Start: 2020-01-01 | End: 2021-07-31

## 2020-01-01 RX ORDER — DEXAMETHASONE 2 MG/1
2 TABLET ORAL 2 TIMES DAILY WITH MEALS
Qty: 60 TABLET | Refills: 0 | Status: SHIPPED | OUTPATIENT
Start: 2020-01-01 | End: 2020-01-01

## 2020-01-01 RX ORDER — LACTULOSE 10 G/15ML
10 SOLUTION ORAL 3 TIMES DAILY PRN
Qty: 450 ML | Refills: 0 | Status: SHIPPED | OUTPATIENT
Start: 2020-01-01 | End: 2020-01-01

## 2020-01-01 RX ORDER — TRAMADOL HYDROCHLORIDE 50 MG/1
50 TABLET ORAL EVERY 4 HOURS PRN
Qty: 60 TABLET | Refills: 0 | Status: SHIPPED | OUTPATIENT
Start: 2020-01-01

## 2020-01-01 RX ORDER — PRAMIPEXOLE DIHYDROCHLORIDE 0.12 MG/1
0.12 TABLET ORAL NIGHTLY
Status: DISCONTINUED | OUTPATIENT
Start: 2020-01-01 | End: 2020-01-01

## 2020-01-01 RX ORDER — FERROUS SULFATE, DRIED 160(50) MG
1 TABLET, EXTENDED RELEASE ORAL 2 TIMES DAILY
Qty: 180 TABLET | Refills: 3 | Status: SHIPPED | OUTPATIENT
Start: 2020-01-01 | End: 2020-01-01 | Stop reason: SDUPTHER

## 2020-01-01 RX ORDER — TALC
6 POWDER (GRAM) TOPICAL NIGHTLY PRN
Status: DISCONTINUED | OUTPATIENT
Start: 2020-01-01 | End: 2020-01-01 | Stop reason: HOSPADM

## 2020-01-01 RX ORDER — SODIUM CHLORIDE 0.9 % (FLUSH) 0.9 %
10 SYRINGE (ML) INJECTION
Status: DISCONTINUED | OUTPATIENT
Start: 2020-01-01 | End: 2020-01-01 | Stop reason: HOSPADM

## 2020-01-01 RX ORDER — TAMSULOSIN HYDROCHLORIDE 0.4 MG/1
CAPSULE ORAL
COMMUNITY
Start: 2020-01-01

## 2020-01-01 RX ORDER — METRONIDAZOLE 250 MG/1
500 TABLET ORAL EVERY 8 HOURS
Qty: 21 TABLET | Refills: 0 | Status: ON HOLD | OUTPATIENT
Start: 2020-01-01 | End: 2020-01-01

## 2020-01-01 RX ORDER — POLYETHYLENE GLYCOL 3350 17 G/17G
17 POWDER, FOR SOLUTION ORAL DAILY
Status: DISCONTINUED | OUTPATIENT
Start: 2020-01-01 | End: 2020-01-01 | Stop reason: HOSPADM

## 2020-01-01 RX ORDER — MORPHINE SULFATE 4 MG/ML
4 INJECTION, SOLUTION INTRAMUSCULAR; INTRAVENOUS
Status: COMPLETED | OUTPATIENT
Start: 2020-01-01 | End: 2020-01-01

## 2020-01-01 RX ORDER — HYDROMORPHONE HYDROCHLORIDE 1 MG/ML
1 INJECTION, SOLUTION INTRAMUSCULAR; INTRAVENOUS; SUBCUTANEOUS EVERY 6 HOURS PRN
Status: DISCONTINUED | OUTPATIENT
Start: 2020-01-01 | End: 2020-01-01

## 2020-01-01 RX ORDER — GADOBUTROL 604.72 MG/ML
7 INJECTION INTRAVENOUS
Status: COMPLETED | OUTPATIENT
Start: 2020-01-01 | End: 2020-01-01

## 2020-01-01 RX ORDER — MORPHINE SULFATE 15 MG/1
15 TABLET, FILM COATED, EXTENDED RELEASE ORAL EVERY 12 HOURS
Qty: 60 TABLET | Refills: 0 | Status: SHIPPED | OUTPATIENT
Start: 2020-01-01 | End: 2020-01-01

## 2020-01-01 RX ORDER — FERROUS SULFATE, DRIED 160(50) MG
1 TABLET, EXTENDED RELEASE ORAL 2 TIMES DAILY
Qty: 180 TABLET | Refills: 3 | Status: SHIPPED | OUTPATIENT
Start: 2020-01-01 | End: 2021-04-09

## 2020-01-01 RX ORDER — SODIUM CHLORIDE 0.9 % (FLUSH) 0.9 %
10 SYRINGE (ML) INJECTION
Status: CANCELLED | OUTPATIENT
Start: 2020-01-01

## 2020-01-01 RX ORDER — OXYCODONE HYDROCHLORIDE 5 MG/1
5 TABLET ORAL EVERY 4 HOURS PRN
Qty: 45 TABLET | Refills: 0 | Status: SHIPPED | OUTPATIENT
Start: 2020-01-01 | End: 2020-01-01 | Stop reason: SDUPTHER

## 2020-01-01 RX ORDER — MORPHINE SULFATE 15 MG/1
15 TABLET, FILM COATED, EXTENDED RELEASE ORAL EVERY 8 HOURS PRN
Qty: 90 TABLET | Refills: 0 | Status: SHIPPED | OUTPATIENT
Start: 2020-01-01

## 2020-01-01 RX ORDER — PRAMIPEXOLE DIHYDROCHLORIDE 0.12 MG/1
0.12 TABLET ORAL NIGHTLY
Qty: 30 TABLET | Refills: 2 | Status: SHIPPED | OUTPATIENT
Start: 2020-01-01 | End: 2021-05-11

## 2020-01-01 RX ORDER — GABAPENTIN 100 MG/1
100 CAPSULE ORAL 3 TIMES DAILY
Status: DISCONTINUED | OUTPATIENT
Start: 2020-01-01 | End: 2020-01-01 | Stop reason: HOSPADM

## 2020-01-01 RX ORDER — DRONABINOL 5 MG/1
5 CAPSULE ORAL
Qty: 60 CAPSULE | Refills: 1 | Status: SHIPPED | OUTPATIENT
Start: 2020-01-01 | End: 2020-01-01 | Stop reason: SDUPTHER

## 2020-01-01 RX ORDER — ONDANSETRON 8 MG/1
8 TABLET, ORALLY DISINTEGRATING ORAL EVERY 8 HOURS PRN
Status: DISCONTINUED | OUTPATIENT
Start: 2020-01-01 | End: 2020-01-01 | Stop reason: HOSPADM

## 2020-01-01 RX ORDER — TRAMADOL HYDROCHLORIDE 50 MG/1
50 TABLET ORAL EVERY 4 HOURS PRN
Qty: 60 TABLET | Refills: 0 | Status: SHIPPED | OUTPATIENT
Start: 2020-01-01 | End: 2020-01-01 | Stop reason: SDUPTHER

## 2020-01-01 RX ORDER — ACETAMINOPHEN 325 MG/1
650 TABLET ORAL EVERY 8 HOURS PRN
Status: DISCONTINUED | OUTPATIENT
Start: 2020-01-01 | End: 2020-01-01 | Stop reason: HOSPADM

## 2020-01-01 RX ORDER — FAMOTIDINE 10 MG/ML
20 INJECTION INTRAVENOUS
Status: CANCELLED | OUTPATIENT
Start: 2020-01-01

## 2020-01-01 RX ORDER — MEGESTROL ACETATE 40 MG/1
40 TABLET ORAL 2 TIMES DAILY
Qty: 60 TABLET | Refills: 3 | Status: SHIPPED | OUTPATIENT
Start: 2020-01-01 | End: 2020-01-01

## 2020-01-01 RX ORDER — OXYCODONE HYDROCHLORIDE 5 MG/1
5 TABLET ORAL EVERY 4 HOURS PRN
Qty: 180 TABLET | Refills: 0 | Status: SHIPPED | OUTPATIENT
Start: 2020-01-01

## 2020-01-01 RX ORDER — VALACYCLOVIR HYDROCHLORIDE 500 MG/1
500 TABLET, FILM COATED ORAL 2 TIMES DAILY
Qty: 30 TABLET | Refills: 1 | Status: SHIPPED | OUTPATIENT
Start: 2020-01-01 | End: 2021-03-02

## 2020-01-01 RX ORDER — MORPHINE SULFATE 15 MG/1
15 TABLET, FILM COATED, EXTENDED RELEASE ORAL EVERY 8 HOURS PRN
Qty: 90 TABLET | Refills: 0 | Status: SHIPPED | OUTPATIENT
Start: 2020-01-01 | End: 2020-01-01 | Stop reason: SDUPTHER

## 2020-01-01 RX ORDER — DEXAMETHASONE 4 MG/1
4 TABLET ORAL EVERY 12 HOURS
Qty: 14 TABLET | Refills: 0 | Status: SHIPPED | OUTPATIENT
Start: 2020-01-01 | End: 2020-01-01

## 2020-01-01 RX ADMIN — GABAPENTIN 100 MG: 100 CAPSULE ORAL at 08:06

## 2020-01-01 RX ADMIN — SODIUM PHOSPHATE, MONOBASIC, MONOHYDRATE 30 MMOL: 276; 142 INJECTION, SOLUTION INTRAVENOUS at 02:06

## 2020-01-01 RX ADMIN — GADOBUTROL 10 ML: 604.72 INJECTION INTRAVENOUS at 07:03

## 2020-01-01 RX ADMIN — GADOBUTROL 7 ML: 604.72 INJECTION INTRAVENOUS at 11:07

## 2020-01-01 RX ADMIN — IOHEXOL 75 ML: 350 INJECTION, SOLUTION INTRAVENOUS at 09:06

## 2020-01-01 RX ADMIN — OYSTER SHELL CALCIUM WITH VITAMIN D 1 TABLET: 500; 200 TABLET, FILM COATED ORAL at 08:06

## 2020-01-01 RX ADMIN — IOHEXOL 15 ML: 350 INJECTION, SOLUTION INTRAVENOUS at 02:01

## 2020-01-01 RX ADMIN — DENOSUMAB 120 MG: 120 INJECTION SUBCUTANEOUS at 02:03

## 2020-01-01 RX ADMIN — HYDROMORPHONE HYDROCHLORIDE 1 MG: 1 INJECTION, SOLUTION INTRAMUSCULAR; INTRAVENOUS; SUBCUTANEOUS at 10:06

## 2020-01-01 RX ADMIN — HYDROMORPHONE HYDROCHLORIDE 1 MG: 1 INJECTION, SOLUTION INTRAMUSCULAR; INTRAVENOUS; SUBCUTANEOUS at 05:06

## 2020-01-01 RX ADMIN — OXYCODONE HYDROCHLORIDE 5 MG: 5 TABLET ORAL at 06:06

## 2020-01-01 RX ADMIN — DENOSUMAB 120 MG: 120 INJECTION SUBCUTANEOUS at 03:01

## 2020-01-01 RX ADMIN — GABAPENTIN 100 MG: 100 CAPSULE ORAL at 03:06

## 2020-01-01 RX ADMIN — OXYCODONE HYDROCHLORIDE 5 MG: 5 TABLET ORAL at 09:06

## 2020-01-01 RX ADMIN — MEGESTROL ACETATE 40 MG: 40 TABLET ORAL at 08:06

## 2020-01-01 RX ADMIN — MORPHINE SULFATE 4 MG: 4 INJECTION, SOLUTION INTRAMUSCULAR; INTRAVENOUS at 02:02

## 2020-01-01 RX ADMIN — GABAPENTIN 100 MG: 100 CAPSULE ORAL at 09:06

## 2020-01-01 RX ADMIN — DENOSUMAB 120 MG: 120 INJECTION SUBCUTANEOUS at 10:05

## 2020-01-01 RX ADMIN — OYSTER SHELL CALCIUM WITH VITAMIN D 1 TABLET: 500; 200 TABLET, FILM COATED ORAL at 09:06

## 2020-01-01 RX ADMIN — IOHEXOL 75 ML: 350 INJECTION, SOLUTION INTRAVENOUS at 03:02

## 2020-01-01 RX ADMIN — IOHEXOL 100 ML: 350 INJECTION, SOLUTION INTRAVENOUS at 03:01

## 2020-01-01 RX ADMIN — HYDROMORPHONE HYDROCHLORIDE 1 MG: 1 INJECTION, SOLUTION INTRAMUSCULAR; INTRAVENOUS; SUBCUTANEOUS at 01:06

## 2020-01-01 RX ADMIN — ENOXAPARIN SODIUM 40 MG: 100 INJECTION SUBCUTANEOUS at 05:06

## 2020-01-06 NOTE — TELEPHONE ENCOUNTER
Message fwd to MD.       ----- Message from Savage Mueller sent at 1/6/2020  2:55 PM CST -----  Contact: Pt   Pt would like to be called back regarding CT scan and labs.     Pt can be reached at [phone].    Thank You.

## 2020-01-10 NOTE — Clinical Note
- start Xgeva next week- Xofigo orders placed (Izabella- please note)He will need follow up based on Xofigo start date and I can assist

## 2020-01-10 NOTE — PROGRESS NOTES
"Subjective:       Patient ID: Martin Alves is a 54 y.o. male.    Chief Complaint: Follow-up    HPI     Presents to review scans and plan next steps  We discussed bone progression, stability in LNs  We discussed treatment strategies here  There are no clinical trials    He will initiate Xgeva and we reviewed this medication, indication and goals  He is edentulous so will not need dental clearance  He will start Calcium and Vit D     We discussed systemic therapy options versus bone directed strategies  We will initiate Xofigo- reviewed this drug and mechanism of action, goals of therapy  We discussed Jevtana as an option as well  He is aware no way to predict treatment response  PSA cannot be checked while on Xofigo as expected to rise     Oncologic History:  - 2015 having difficulty urinating- urgency and frequency  Saw Dr. Monson  PSA= 4.6 ng/ml and placed on Flomax  Advised to recheck in 1 month and it was 3.9 ng/ml  - early 2016 PSA- 2/16 = 7.8 ng/ml  - 2/16 prostate biopsy- told very aggressive Erika=9  Placed on lupron q 3 months at that time (last 10/18, due again 1/19)  MRI prostate  CT scans- seen in LNs and base of bladder  Bone scan- negative  - told inoperable  - referred to Dr. Kirkland, medical oncology  - port placed 3/21/16  Initiated Taxotere q 3 weeks x 6 (started on 3/25/16)  - PSA dropped significantly   - plan was to rescan and check PSA after 3 cycles- delays with insurance so done instead after 2 chemotherapy treatments in 5/19/2016  Told "cancer 98% dead" Lns back to normal size  - 5/26/18 PSA= 0.01 ng/ml  - 5/27/18 3rd cycle of chemo began  - tolerated chemotherapy well - did receive neupogen x 3 days post  - completed 6 cycles 7/29/16  - was doing scans q 6 months and blood work every 3 months  - Dr. Hoskins left [facility] May 2017- notified by letter  - Dr. Costello-   - 8/17 scans and labs stable  - Dr. Sethi was assigned most recently as medical oncologist  - July 2018 PSA= 1.68 " ng/ml  - 10/1518 CT scans and bone scan  - 10/22/18 saw Dr. Sethi  PSA 1.58 ng/ml  LNs increased slightly on scans  Bone scan negative  Advised him to start Zytiga, started 11/6/18 (with Prednisone)  - concerned about medication and side effects  Feels like he did well on chemotherapy  Concerned about MD turnover  Sent to a chemo class and questions why for this regimen and then got a bill for a co-pay  - came here for a second opinion     - started Zytiga and Prednisone.   -progression noted recently and taxotere re-initiated.         HM:  Colonoscopy neg 1 yr ago        PMFSH: all information reviewed and updated as relevant to today's visit    Plan as above    25 minutes total

## 2020-01-10 NOTE — PLAN OF CARE
DISCONTINUE ON PATHWAY REGIMEN - Prostate    POS37        Docetaxel (Taxotere(R))       Prednisone           Additional Orders: Premedicate with dexamethasone 8 mg PO bid for three   days beginning 1 day prior to therapy    **Always confirm dose/schedule in your pharmacy ordering system**    REASON: Disease Progression  PRIOR TREATMENT: POS37: Docetaxel 75 mg/m2 q21 Days + Prednisone 5 mg BID Until   Progression or Toxicity  TREATMENT RESPONSE: Progressive Disease (PD)    Prostate - No Medical Intervention - Off Treatment.    Patient Characteristics:  Adenocarcinoma, Distant Metastases, Castration Resistant, Asymptomatic/Minimally   Symptomatic, Third Line  Histology: Adenocarcinoma  Therapeutic Status: Distant Metastases  Line of Therapy: Third Line

## 2020-01-20 NOTE — TELEPHONE ENCOUNTER
Returned call to Freida.  Freida unavailable.   Left VM on confidential line for Freida- informing ref #, pt name,  and that PA and clinicals were faxed on 20.   Callback number provided if any questions/concerns.       ----- Message from Carl Auguste sent at 2020 11:55 AM CST -----  Contact: DEEDEE Guan - [facility] prior authorization for radium 223 ask for a call  Please reference # [id number] when calling Need clinicals as soon as possible Pt's treatment is being held until information is received.     Contact info  [phone] ext [phone] Phone   [phone] Fax

## 2020-01-20 NOTE — PROGRESS NOTES
"Subjective:       Patient ID: Martin Alves is a 54 y.o. male.    Vitals:  height is 5' 9" (1.753 m) and weight is 87.1 kg (192 lb). His temperature is 100 °F (37.8 °C). His blood pressure is 121/74 and his pulse is 93. His oxygen saturation is 98%.     Chief Complaint: URI    53 yo male presents with chief complaint of cough, congestion, and generalized body aches since Saturday night. Pt states that he was exposed to influenza 2 weeks ago.     URI    This is a new problem. The current episode started in the past 7 days. The problem has been gradually worsening. The fever has been present for less than 1 day. Associated symptoms include congestion and coughing. Pertinent negatives include no ear pain, nausea, rash, sinus pain, sore throat, vomiting or wheezing. He has tried acetaminophen for the symptoms. The treatment provided mild relief.       Constitution: Positive for chills and fever. Negative for sweating.   HENT: Positive for congestion. Negative for ear pain, sinus pain, sinus pressure, sore throat and voice change.    Neck: Negative for painful lymph nodes.   Eyes: Negative for eye redness.   Respiratory: Positive for cough and sputum production. Negative for chest tightness, bloody sputum, COPD, shortness of breath, stridor, wheezing and asthma.    Gastrointestinal: Negative for nausea and vomiting.   Musculoskeletal: Negative for muscle ache.   Skin: Negative for rash.   Allergic/Immunologic: Positive for immunizations up-to-date and flu shot. Negative for seasonal allergies and asthma.   Hematologic/Lymphatic: Negative for swollen lymph nodes.       Objective:      Physical Exam   Constitutional: He appears well-developed and well-nourished.   HENT:   Head: Normocephalic and atraumatic.   Nose: Mucosal edema and rhinorrhea present. Right sinus exhibits no maxillary sinus tenderness and no frontal sinus tenderness. Left sinus exhibits no maxillary sinus tenderness and no frontal sinus " tenderness.   Eyes: Pupils are equal, round, and reactive to light. EOM are normal.   Neck: Normal range of motion. Neck supple.   Cardiovascular: Normal rate, regular rhythm and normal heart sounds.   Pulmonary/Chest: Effort normal and breath sounds normal.   Abdominal: Soft.   Nursing note and vitals reviewed.    Results for orders placed or performed in visit on 01/20/20   POCT Influenza A/B   Result Value Ref Range    Rapid Influenza A Ag Negative Negative    Rapid Influenza B Ag Negative Negative     Acceptable Yes          Assessment:       1. Upper respiratory tract infection, unspecified type        Plan:         Upper respiratory tract infection, unspecified type  -     POCT Influenza A/B  -     fluticasone propionate (FLONASE) 50 mcg/actuation nasal spray; 1 spray (50 mcg total) by Each Nostril route once daily.  Dispense: 9.9 mL; Refill: 0  -     benzonatate (TESSALON PERLES) 100 MG capsule; Take 1 capsule (100 mg total) by mouth every 6 (six) hours as needed for Cough.  Dispense: 30 capsule; Refill: 1

## 2020-01-20 NOTE — PATIENT INSTRUCTIONS
Viral Upper Respiratory Illness (Adult)  You have a viral upper respiratory illness (URI), which is another term for the common cold. This illness is contagious during the first few days. It is spread through the air by coughing and sneezing. It may also be spread by direct contact (touching the sick person and then touching your own eyes, nose, or mouth). Frequent handwashing will decrease risk of spread. Most viral illnesses go away within 7 to 10 days with rest and simple home remedies. Sometimes the illness may last for several weeks. Antibiotics will not kill a virus, and they are generally not prescribed for this condition.    Home care  · If symptoms are severe, rest at home for the first 2 to 3 days. When you resume activity, don't let yourself get too tired.  · Avoid being exposed to cigarette smoke (yours or others).  · You may use acetaminophen or ibuprofen to control pain and fever, unless another medicine was prescribed. (Note: If you have chronic liver or kidney disease, have ever had a stomach ulcer or gastrointestinal bleeding, or are taking blood-thinning medicines, talk with your healthcare provider before using these medicines.) Aspirin should never be given to anyone under 18 years of age who is ill with a viral infection or fever. It may cause severe liver or brain damage.  · Your appetite may be poor, so a light diet is fine. Avoid dehydration by drinking 6 to 8 glasses of fluids per day (water, soft drinks, juices, tea, or soup). Extra fluids will help loosen secretions in the nose and lungs.  · Over-the-counter cold medicines will not shorten the length of time youre sick, but they may be helpful for the following symptoms: cough, sore throat, and nasal and sinus congestion. (Note: Do not use decongestants if you have high blood pressure.)  Follow-up care  Follow up with your healthcare provider, or as advised.  When to seek medical advice  Call your healthcare provider right away if any  of these occur:  · Cough with lots of colored sputum (mucus)  · Severe headache; face, neck, or ear pain  · Difficulty swallowing due to throat pain  · Fever of 100.4°F (38°C)  Call 911, or get immediate medical care  Call emergency services right away if any of these occur:  · Chest pain, shortness of breath, wheezing, or difficulty breathing  · Coughing up blood  · Inability to swallow due to throat pain  Date Last Reviewed: 9/13/2015  © 7935-3351 [employer]. [address] [place] [address]. All rights reserved. This information is not intended as a substitute for professional medical care. Always follow your healthcare professional's instructions.

## 2020-01-21 NOTE — NURSING
Patient received Xgeva injection SQ to ABD.  Tolerated well.  Educated patient on medication using [employer] printout and [employer] flyer.  Patient reports taking calcium and vitamin D.  All questions answered and patient verbalized understanding.

## 2020-01-23 NOTE — TELEPHONE ENCOUNTER
Xofigo PA approval through [facility] #[id number] 1/13/20-1/13/21.       ----- Message from Izabella Mares sent at 1/10/2020  1:51 PM CST -----  Xofigo orders placed

## 2020-01-28 NOTE — TELEPHONE ENCOUNTER
Returned call to NM dept and scheduled CBC as requested.   Pt notified via [facility] of appt.        ----- Message from Jose Vargas sent at 1/28/2020  1:52 PM CST -----  Contact: /Nuclear Medicine   called in and wanted to speak with someone at the office. She mentiont that the patient needs bloodwork completed before 02/03/20. She can be reached at    Ext:[phone]

## 2020-02-03 NOTE — Clinical Note
Cbc, cmp weekly.RTC in 4 weeks to see Dr. Escobar or myself with a cbc, cmp, and for XGEVA and Xofigo #2.

## 2020-02-03 NOTE — PROGRESS NOTES
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 Alves is a 54 y.o. who returns in follow up for Metastatic Prostate Cancer.  Recent progression, here to start Xofigo.   Xgeva received 1/21/2020.       Today,   Feels ok   Left hip pain- seems to be slightly worse as feels he did too much at home last week.   Slightly improved today. He is requesting pain med for at night. He took his wife's tramadol with good relief last night. Tylenol relieves pain during the day.   No other complaints.       He is accompanied by his self.    Oncologic History:  - 2015 having difficulty urinating- urgency and frequency  Saw Dr. Monosn  PSA= 4.6 ng/ml and placed on Flomax  Advised to recheck in 1 month and it was 3.9 ng/ml  - early 2016 PSA- 2/16 = 7.8 ng/ml  - 2/16 prostate biopsy- told very aggressive Erika=9  Placed on lupron q 3 months at that time (last 10/18, due again 1/19)  MRI prostate  CT scans- seen in LNs and base of bladder  Bone scan- negative  - told inoperable  - referred to Dr. Kirkland, medical oncology  - port placed 3/21/16  Initiated Taxotere q 3 weeks x 6 (started on 3/25/16)  - PSA dropped significantly   - plan was to rescan and check PSA after 3 cycles- delays with insurance so done instead after 2 chemotherapy treatments in 5/19/2016  Told "cancer 98% dead" Lns back to normal size  - 5/26/18 PSA= 0.01 ng/ml  - 5/27/18 3rd cycle of chemo began  - tolerated chemotherapy well - did receive neupogen x 3 days post  - completed 6 cycles 7/29/16  - was doing scans q 6 months and blood work every 3 months  - Dr. Hoskins left [facility] May 2017- notified by letter  - Dr. Costello-   - 8/17 scans and labs stable  - Dr. Sethi was assigned most recently as medical oncologist  - July 2018 PSA= 1.68 ng/ml  - 10/1518 CT scans and bone scan  - 10/22/18 " saw Dr. Sethi  PSA 1.58 ng/ml  LNs increased slightly on scans  Bone scan negative  Advised him to start Zytiga, started 11/6/18 (with Prednisone)  - concerned about medication and side effects  Feels like he did well on chemotherapy  Concerned about MD turnover  Sent to a chemo class and questions why for this regimen and then got a bill for a co-pay  - came here for a second opinion     -started Zytiga and Prednisone.   -progression noted recently and taxotere re-initiated.   -progression noted, plan to start Xofigo      HM:  Colonoscopy neg 1 yr ago       PMFSH: all information reviewed and updated as relevant to today's visit    Review of Systems:   Review of Systems   Constitutional: Positive for fatigue (mild). Negative for activity change, appetite change and fever.   HENT: Negative for mouth sores, nosebleeds and sore throat.    Eyes: Negative for visual disturbance.   Respiratory: Negative for cough and shortness of breath.    Cardiovascular: Negative for chest pain, palpitations and leg swelling.   Gastrointestinal: Negative for abdominal pain, constipation, diarrhea, nausea and vomiting.   Genitourinary: Negative for difficulty urinating and frequency.   Musculoskeletal: Positive for arthralgias (left hip). Negative for back pain and joint swelling.   Skin: Negative for rash.   Neurological: Negative for dizziness, numbness and headaches.   Hematological: Negative for adenopathy. Does not bruise/bleed easily.   Psychiatric/Behavioral: Negative for confusion and sleep disturbance. The patient is not nervous/anxious.    All other systems reviewed and are negative.        Objective:      Vitals:   Vitals:    02/03/20 1442   BP: 128/78   Pulse: 80   Resp: 16   Temp: 98.7 °F (37.1 °C)   TempSrc: Oral   SpO2: 98%   Weight: 83.7 kg (184 lb 8.4 oz)     BMI: Body mass index is 27.25 kg/m².    Physical Exam:   Physical Exam   Constitutional: He is oriented to person, place, and time. He appears well-developed and  well-nourished. No distress.   HENT:   Head: Normocephalic and atraumatic.   Nose: Nose normal.   Mouth/Throat: Oropharynx is clear and moist.   Eyes: Pupils are equal, round, and reactive to light. Conjunctivae and lids are normal. No scleral icterus.   Neck: Trachea normal and normal range of motion. Neck supple. No JVD present. No thyromegaly present.   Cardiovascular: Normal rate, regular rhythm, normal heart sounds, intact distal pulses and normal pulses.   No murmur heard.  Pulmonary/Chest: Effort normal and breath sounds normal. He has no wheezes.   Abdominal: Soft. Normal appearance and bowel sounds are normal. He exhibits no distension and no mass. There is no hepatosplenomegaly or splenomegaly. There is no tenderness.   No organomegaly.    Musculoskeletal: Normal range of motion. He exhibits no edema.   No spinal or paraspinal tenderness.    Lymphadenopathy:        Head (right side): No submental and no submandibular adenopathy present.        Head (left side): No submental and no submandibular adenopathy present.     He has no cervical adenopathy.     He has no axillary adenopathy.        Right: No supraclavicular adenopathy present.        Left: No supraclavicular adenopathy present.   Neurological: He is alert and oriented to person, place, and time. He has normal reflexes. He displays normal reflexes. No sensory deficit. Coordination normal.   Skin: Skin is warm, dry and intact. Capillary refill takes less than 2 seconds. No bruising and no rash noted. Nails show no clubbing.   Psychiatric: He has a normal mood and affect. His speech is normal and behavior is normal. Cognition and memory are normal.   Nursing note and vitals reviewed.        ECOG Performance Status:   ECOG SCORE    1 - Restricted in strenuous activity-ambulatory and able to carry out work of a light nature         Laboratory Data:  Results for orders placed or performed in visit on 02/03/20   CBC Oncology   Result Value Ref Range     WBC 10.24 3.90 - 12.70 K/uL    RBC 4.55 (L) 4.60 - 6.20 M/uL    Hemoglobin 12.3 (L) 14.0 - 18.0 g/dL    Hematocrit 37.7 (L) 40.0 - 54.0 %    Mean Corpuscular Volume 83 82 - 98 fL    Mean Corpuscular Hemoglobin 27.0 27.0 - 31.0 pg    Mean Corpuscular Hemoglobin Conc 32.6 32.0 - 36.0 g/dL    RDW 15.3 (H) 11.5 - 14.5 %    Platelets 393 (H) 150 - 350 K/uL    MPV 9.0 (L) 9.2 - 12.9 fL    Gran # (ANC) 7.4 1.8 - 7.7 K/uL    Immature Grans (Abs) 0.05 (H) 0.00 - 0.04 K/uL   Comprehensive metabolic panel   Result Value Ref Range    Sodium 138 136 - 145 mmol/L    Potassium 4.0 3.5 - 5.1 mmol/L    Chloride 104 95 - 110 mmol/L    CO2 27 23 - 29 mmol/L    Glucose 114 (H) 70 - 110 mg/dL    BUN, Bld 10 6 - 20 mg/dL    Creatinine 0.8 0.5 - 1.4 mg/dL    Calcium 8.8 8.7 - 10.5 mg/dL    Total Protein 6.7 6.0 - 8.4 g/dL    Albumin 3.6 3.5 - 5.2 g/dL    Total Bilirubin 0.3 0.1 - 1.0 mg/dL    Alkaline Phosphatase 249 (H) 55 - 135 U/L    AST 19 10 - 40 U/L    ALT 16 10 - 44 U/L    Anion Gap 7 (L) 8 - 16 mmol/L    eGFR if African American >60.0 >60 mL/min/1.73 m^2    eGFR if non African American >60.0 >60 mL/min/1.73 m^2        Imaging:  NM Therapy By IV Administration XOFIGO  EXAMINATION:  NM THERAPY BY IV ADMINISTRATION XOFIGO    CLINICAL HISTORY:  Malignant neoplasm of prostate    FINDINGS:  Patient was administered 168 uCi of radium 223 Xofigo intravenously for treatment of bone metastases.    Electronically signed by: Daryl Cho MD  Date:    02/04/2020  Time:    08:16     BMD 12/18/18:   NORMAL BMD OF THE HIP AND LUMBAR SPINE.  THE TBS T-SCORE L1-L4 IS -0.4.  RECOMMENDATIONS of [facility]:  1.  Calcium 1000 mg daily and vitamin D 600 units daily, adequate exercise.  2.  Keep prednisone dose to a minimum.  3.  Repeat BMD in 1-2 years depending on prednisone dose.          Assessment/Plan:     1. 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 Bone metastases    3. Neoplasm related  pain    4. Antineoplastic chemotherapy induced anemia        Plan:    Start Xofigo- every 4 weeks x 6 cycles.   He understands his PSA cannot be checked on Xofigo as expected to rise.   Cbc, cmp weekly.  RTC in 4 weeks to see Dr. Escobar or myself with a cbc, cmp, and for Xofigo #2.   Next Xgeva in 4 weeks  Continue Calcium and Vit D.   Discussed Megace-will call if he decides he wants to try.  Tramadol rx for prn pain. We discussed XRT but opts out at this time.       Xofigo adverse effects discussed:    Med and Orders:  Orders Placed This Encounter    CBC Oncology    Comprehensive metabolic panel    traMADol (ULTRAM) 50 mg tablet       Follow Up:  Follow up in about 4 weeks (around 3/2/2020).      Patient is in agreement with the proposed treatment plan. All questions were answered to the patient's satisfaction. Pt knows to call clinic for any new or worsening symptoms and if anything is needed before the next clinic visit.      JOSELYN OlivaresP-C  Hematology & Oncology  [address] [place] [address]  ph. [phone]  Fax. [phone]     I spent 40 minutes (face to face) with the patient, more than 50% was in counseling and coordination of care as detailed above.

## 2020-02-20 NOTE — ED TRIAGE NOTES
Coming in c/o abdominal cramping since yesterday. Though was constipation and took milk of magnesia then had diarrhea during the night. Still c/o abdominal cramping. Wife recently diagnosed with flu.

## 2020-02-20 NOTE — ED PROVIDER NOTES
Encounter Date: 2/20/2020       History     Chief Complaint   Patient presents with    Abdominal Pain     since yesterday; on chemo shots      54-year-old male with a history of metastatic prostate cancer presents with lower abdominal pain since last night.  Patient thought he was constipated so took some milk of magnesia and had a bowel movement.  Patient has had associated nausea and vomiting. He is currently undergoing chemotherapy for his cancer.  His wife was recently diagnosed with influenza B.  Patient had associated sore throat. He denies diarrhea, fever, cough, shortness of breath, chest pain, or dysuria.  Patient has 2 known bilateral inguinal hernias that have not been operated on because of his cancer.  A ten point review of systems was completed and is negative except as documented above.  Patient denies any other acute medical complaint.  The patients available PMH, PSH, Social History, medications, allergies, and triage vital signs were reviewed just prior to their medical evaluation.        Review of patient's allergies indicates:  No Known Allergies  Past Medical History:   Diagnosis Date    Prostate cancer     Chemo in 2016     Past Surgical History:   Procedure Laterality Date    PORTACATH PLACEMENT       Family History   Problem Relation Age of Onset    Emphysema Mother     Heart attack Father     No Known Problems Brother     Cerebral aneurysm Maternal Aunt     No Known Problems Maternal Uncle     No Known Problems Paternal Aunt     No Known Problems Paternal Uncle     No Known Problems Maternal Grandmother     No Known Problems Maternal Grandfather     Colon cancer Paternal Grandmother     No Known Problems Paternal Grandfather     No Known Problems Brother     No Known Problems Son     No Known Problems Son      Social History     Tobacco Use    Smoking status: Former Smoker     Packs/day: 0.50     Years: 40.00     Pack years: 20.00     Types: Cigarettes     Start date:  1978     Last attempt to quit: 2018     Years since quittin.2    Smokeless tobacco: Former User     Types: Chew     Quit date: 2017   Substance Use Topics    Alcohol use: Yes     Alcohol/week: 1.0 standard drinks     Types: 1 Cans of beer per week     Comment: Occasional    Drug use: No     Review of Systems   Constitutional: Negative for fever.   HENT: Negative for sore throat.    Eyes: Negative for visual disturbance.   Respiratory: Negative for cough and shortness of breath.    Cardiovascular: Negative for chest pain.   Gastrointestinal: Positive for abdominal pain, nausea and vomiting. Negative for diarrhea.   Genitourinary: Negative for dysuria, scrotal swelling and testicular pain.   Musculoskeletal: Negative for neck pain.   Skin: Negative for rash and wound.   Allergic/Immunologic: Positive for immunocompromised state.   Neurological: Negative for syncope.   Psychiatric/Behavioral: Negative for confusion.       Physical Exam     Initial Vitals [20 1244]   BP Pulse Resp Temp SpO2   128/82 66 16 98.1 °F (36.7 °C) 98 %      MAP       --         Physical Exam    Nursing note and vitals reviewed.  Constitutional: He appears well-developed and well-nourished. He is not diaphoretic. No distress.   HENT:   Head: Normocephalic and atraumatic.   Nose: Nose normal.   Eyes: Conjunctivae are normal. Right eye exhibits no discharge. Left eye exhibits no discharge.   Neck: Normal range of motion. Neck supple.   Cardiovascular: Normal rate, regular rhythm and normal heart sounds. Exam reveals no gallop and no friction rub.    No murmur heard.  Pulmonary/Chest: Breath sounds normal. No respiratory distress. He has no wheezes. He has no rhonchi. He has no rales.   Abdominal: Soft. He exhibits no distension and no mass. There is tenderness. There is no rebound and no guarding.   RLQ and superpubic ttp, mild   Genitourinary: Penis normal.   Genitourinary Comments: Bilateral inguinal hernias, L>R,  no strangulation or incarceration, testes normal, wife chaperoned exam   Musculoskeletal: Normal range of motion. He exhibits no edema or tenderness.   Neurological: He is alert and oriented to person, place, and time. He has normal strength. GCS score is 15. GCS eye subscore is 4. GCS verbal subscore is 5. GCS motor subscore is 6.   Skin: Skin is warm and dry. No rash noted. No erythema.   Psychiatric: He has a normal mood and affect. His behavior is normal. Judgment and thought content normal.         ED Course   Procedures  Labs Reviewed   CBC W/ AUTO DIFFERENTIAL - Abnormal; Notable for the following components:       Result Value    Hemoglobin 12.6 (*)     Hematocrit 39.8 (*)     Mean Corpuscular Hemoglobin 26.4 (*)     Mean Corpuscular Hemoglobin Conc 31.7 (*)     RDW 15.4 (*)     Immature Granulocytes 0.6 (*)     All other components within normal limits   COMPREHENSIVE METABOLIC PANEL - Abnormal; Notable for the following components:    Sodium 135 (*)     Alkaline Phosphatase 488 (*)     Anion Gap 6 (*)     All other components within normal limits   URINALYSIS, REFLEX TO URINE CULTURE - Abnormal; Notable for the following components:    Ketones, UA Trace (*)     All other components within normal limits    Narrative:     Preferred Collection Type->Urine, Clean Catch   INFLUENZA A & B BY MOLECULAR   LIPASE   LACTIC ACID, PLASMA          Imaging Results          CT Abdomen Pelvis With Contrast (Edited Result - FINAL)  Result time 02/20/20 16:52:59    Addendum 1 of 1 by Carlos Roman MD (02/20/20 16:52:59)      Please note, the configuration of the pancreas is stable in appearance from examination 01/09/2020 as well as 08/07/2019.  Correlation with any history of pancreatic surgery as warranted.      Electronically signed by: Carlos Roman MD  Date:    02/20/2020  Time:    16:52               Final result by Carlos Roman MD (02/20/20 16:17:14)                 Impression:      This report was  flagged in Epic as abnormal.    1. Since the previous examination of 01/09/2020, there has been interval development/worsening lymphadenopathy particularly involving the periaortic and paracaval regions, and along the bilateral iliac chains.  Correlation is advised in this patient with history of metastatic prostate malignancy..  2. Stable appearance of known metastatic lesions within the vertebral body of L3 and within the left 7th rib.  3. Stable multifocal low attenuating foci within the hepatic parenchyma, along the left hepatic lobe laterally, lesion correlates with cyst.  4. Scattered fluid-filled small bowel loops, nonspecific and nondilated.  Early enteritis is a consideration.  5. Strand-like fluid along the anterior aspect of the left psoas, could reflect reactive change related to developing enteritis although nonspecific.  Correlation is advised.  6. Please see above for several additional findings.      Electronically signed by: Carlos Roman MD  Date:    02/20/2020  Time:    16:17             Narrative:    EXAMINATION:  CT ABDOMEN PELVIS WITH CONTRAST    CLINICAL HISTORY:  RLQ pain, appendicitis suspected;    TECHNIQUE:  Low dose axial images, sagittal and coronal reformations were obtained from the lung bases to the pubic symphysis following the IV administration of 75 mL of Omnipaque 350 .  Oral contrast was not given.    COMPARISON:  01/09/2020    FINDINGS:  Images of the lower thorax are remarkable for bilateral dependent atelectasis, mild.    The liver is hypoattenuating, suggesting steatosis, correlation with LFTs recommended.  There are several scattered low attenuating lesions throughout the hepatic parenchyma, the majority of which are too small to characterize, largest single focus is noted within the lateral aspect of the left hepatic lobe and measures 1.1 cm, attenuation of which suggests cyst and is stable.  The spleen has a lobular contour, similar to the previous examination.  There is  a right adrenal nodule measuring 3 cm, and a left adrenal nodule measuring 1.7 cm, both of which nonspecific.  There may have been interval enlargement of the right adrenal lesion versus slice selection, slice selection favored.  There is surgical change of distal pancreatectomy, the residual pancreas enhances homogeneously without ductal dilation.  The portal vein, splenic vein, proximal SMV, celiac axis and SMA all are patent.  The stomach is decompressed.  Splenule is are noted.  The gallbladder is unremarkable.  There is no biliary dilation or ascites.    There are several enlarged periaortic and paracaval lymph nodes.  Since the previous examination, there has been enlargement of an anterior periaortic lymph node, now measuring 2.3 cm, previously subcentimeter.  Additional prominent right periaortic lymph nodes are noted, all of which in large to since the previous examination.  There are several enlarged lymph nodes along the bilateral iliac chains, right greater than left, also increased in size as compared to the previous examination, largest conglomeration along the right iliac chain measures up to 3 cm.    The kidneys enhance symmetrically without hydronephrosis or nephrolithiasis.  The bilateral ureters are unremarkable without calculi seen.  The urinary bladder is distended without wall thickening.  The prostate is not enlarged.    There are a few scattered colonic diverticula without surrounding inflammation.  Please note, the majority of the large bowel is located on the right side of the abdomen and pelvis.  The terminal ileum is unremarkable.  The appendix is unremarkable.  The small bowel is grossly unremarkable.  There is strand-like fluid adjacent to the anterior aspect of the left psoas, new since the previous examination.  This abuts scattered fluid-filled small bowel loops, without small bowel dilation.  There is atherosclerotic calcification of the aorta and its branches.  No focal organized  pelvic fluid collection.  There are bilateral fat containing inguinal hernias.    Degenerative changes are noted of the spine.  There is a sclerotic lesion within the posterior aspect of L3, correlating with region of increased uptake on recent whole body bone scan.  Additional sclerotic focus is noted within the left 7th rib, also identified on previous bone scan.  There is probable metastatic involvement of the posterior aspect of the L4 vertebral body.  There are additional scattered regions of sclerosis within the vertebral bodies, suggesting additional regions of metastatic involvement.  No significant inguinal lymphadenopathy.                                 Medical Decision Making:   History:   I obtained history from: someone other than patient.       <> Summary of History: Wife and son assist HPI  Old Medical Records: I decided to obtain old medical records.  Old Records Summarized: records from previous admission(s).       <> Summary of Records: My previous note  Clinical Tests:   Lab Tests: Ordered and Reviewed  Radiological Study: Ordered and Reviewed  ED Management:  54-year-old male with metastatic prostate cancer presents with lower abdominal pain. Vitals with slight hypertension.  Physical exam as above.  Labs unremarkable.  CT with lymphadenopathy.  Likely from worsening cancer.  Discussed with Oncology.  Will discharge home with outpatient follow-up tomorrow.  Patient will take his home tramadol for pain.  No acute surgical emergency.  Patient will return to ED for worsening symptoms, inability to eat/drink, fever greater than 100.4, or any other concerns.  Did bedside teaching with return precautions.  All questions answered.  The patient acknowledges understanding.  Gave verbal discharge instructions.  Other:   I have discussed this case with another health care provider.       <> Summary of the Discussion: Onc, f/up                                 Clinical Impression:   Final  diagnoses:  [R10.31] Right lower quadrant abdominal pain (Primary)  [R59.1] Lymphadenopathy  [Z85.46] History of prostate cancer      Disposition:   Disposition: Discharged  Condition: Stable      Level of Complexity:  High, level 5.               Fabrizio Byers MD  02/20/20 2035

## 2020-03-02 NOTE — PROGRESS NOTES
"Subjective:       Patient ID: Martin Alves is a 54 y.o. male.    Chief Complaint: Prostate cancer metastatic to intrapelvic lymph node    HPI     Presents not feeling well  In the interval he was diagnosed with enteritis which has resolved  Ct scans with findings and worsening LAD- unclear if related to inflammation or worsening disease  He presents for cycle # 2 of Xofigo but clinically we are concerned about proceeding  Reports now pain in back and left hip 8/10  + weight loss  + nausea    Chief complaint:      Chief Complaint   Patient presents with    Prostate cancer metastatic to intrapelvic lymph node      Xgeva received 1/21/2020.   Post 1 cycle Xofigo     Oncologic History:  - 2015 having difficulty urinating- urgency and frequency  Saw Dr. Monson  PSA= 4.6 ng/ml and placed on Flomax  Advised to recheck in 1 month and it was 3.9 ng/ml  - early 2016 PSA- 2/16 = 7.8 ng/ml  - 2/16 prostate biopsy- told very aggressive Erika=9  Placed on lupron q 3 months at that time (last 10/18, due again 1/19)  MRI prostate  CT scans- seen in LNs and base of bladder  Bone scan- negative  - told inoperable  - referred to Dr. Kirkland, medical oncology  - port placed 3/21/16  Initiated Taxotere q 3 weeks x 6 (started on 3/25/16)  - PSA dropped significantly   - plan was to rescan and check PSA after 3 cycles- delays with insurance so done instead after 2 chemotherapy treatments in 5/19/2016  Told "cancer 98% dead" Lns back to normal size  - 5/26/18 PSA= 0.01 ng/ml  - 5/27/18 3rd cycle of chemo began  - tolerated chemotherapy well - did receive neupogen x 3 days post  - completed 6 cycles 7/29/16  - was doing scans q 6 months and blood work every 3 months  - Dr. Hoskins left [facility] May 2017- notified by letter  - Dr. Costello-   - 8/17 scans and labs stable  - Dr. Sethi was assigned most recently as medical oncologist  - July 2018 PSA= 1.68 ng/ml  - 10/1518 CT scans and bone scan  - 10/22/18 saw Dr. Sethi  PSA 1.58 " ng/ml  LNs increased slightly on scans  Bone scan negative  Advised him to start Zytiga, started 11/6/18 (with Prednisone)  - concerned about medication and side effects  Feels like he did well on chemotherapy  Concerned about MD turnover  Sent to a chemo class and questions why for this regimen and then got a bill for a co-pay  - came here for a second opinion     -started Zytiga and Prednisone.   -progression noted recently and taxotere re-initiated.   -progression noted, plan to start Xofigo        HM:  Colonoscopy neg 1 yr ago        PMFSH: all information reviewed and updated as relevant to today's visit      Review of Systems   Constitutional: Positive for activity change, appetite change, fatigue (mild) and unexpected weight change. Negative for fever.   HENT: Negative for mouth sores, nosebleeds and sore throat.         Fever blisters   Eyes: Negative for visual disturbance.   Respiratory: Negative for cough and shortness of breath.    Cardiovascular: Negative for chest pain, palpitations and leg swelling.   Gastrointestinal: Negative for abdominal pain, constipation, diarrhea, nausea and vomiting.   Genitourinary: Negative for difficulty urinating and frequency.   Musculoskeletal: Positive for arthralgias (left hip and low back). Negative for back pain and joint swelling.   Skin: Negative for rash.   Neurological: Negative for dizziness, numbness and headaches.   Hematological: Negative for adenopathy. Does not bruise/bleed easily.   Psychiatric/Behavioral: Negative for confusion and sleep disturbance. The patient is not nervous/anxious.        Objective:      Physical Exam   Constitutional: He is oriented to person, place, and time. He appears well-developed. He appears distressed.   Presents with wife and son  In a wheelchair  ECOG= 1   HENT:   Head: Normocephalic and atraumatic.   Nose: Nose normal.   Mouth/Throat: Oropharynx is clear and moist.   Herpetic lesions lips   Eyes: Pupils are equal, round,  and reactive to light. Conjunctivae and lids are normal. No scleral icterus.   Neck: Trachea normal and normal range of motion. Neck supple. No JVD present. No thyromegaly present.   Cardiovascular: Normal rate, regular rhythm, normal heart sounds, intact distal pulses and normal pulses.   No murmur heard.  Pulmonary/Chest: Effort normal and breath sounds normal. No respiratory distress. He has no wheezes. He exhibits no tenderness.   Abdominal: Soft. Normal appearance and bowel sounds are normal. He exhibits no distension and no mass. There is no hepatosplenomegaly or splenomegaly. There is no tenderness.   No organomegaly.    Musculoskeletal: Normal range of motion. He exhibits tenderness. He exhibits no edema or deformity.   No spinal or paraspinal tenderness.    Lymphadenopathy:        Head (right side): No submental and no submandibular adenopathy present.        Head (left side): No submental and no submandibular adenopathy present.     He has no cervical adenopathy.     He has no axillary adenopathy.        Right: No supraclavicular adenopathy present.        Left: No supraclavicular adenopathy present.   Neurological: He is alert and oriented to person, place, and time. He has normal reflexes. He displays normal reflexes. No sensory deficit. Coordination normal.   Skin: Skin is warm, dry and intact. Capillary refill takes less than 2 seconds. No bruising and no rash noted. He is not diaphoretic. Nails show no clubbing.   Psychiatric: He has a normal mood and affect. His speech is normal and behavior is normal. Cognition and memory are normal.   Nursing note and vitals reviewed.    Labs- reviewed  Assessment:       1. Herpes infection    2. Prostate cancer metastatic to bone    3. Prostate cancer metastatic to intrapelvic lymph node    4. Neoplasm related pain        Plan:     1. Valtrex  2-4. Xrays today to rule out fracture  MRIs this week  Concern for disease progression  Hold Xofigo

## 2020-03-02 NOTE — TELEPHONE ENCOUNTER
----- Message from Divya Covarrubias sent at 3/2/2020  2:48 PM CST -----  Contact: Krystin (wife) @ [phone]  Calling to speak with someone in Dr. Escobar's office regarding the RX for medication: dronabinol (MARINOL) 5 MG capsule, shows print but not sent to pharmacy, patient is asking to have sent to a different location.     New pharmacy location:     [id number]  [address] [facility] [address]  [address] [facility] [address]  [facility] [address] [place] [address]  Phone: [phone] Fax: [phone]

## 2020-03-02 NOTE — TELEPHONE ENCOUNTER
Communicated with pt via [facility].        ----- Message from Herlinda Escobar MD sent at 3/2/2020  3:19 PM CST -----  Please let him know no findings on xray- no fracture

## 2020-03-03 NOTE — TELEPHONE ENCOUNTER
Martin Alves   [place]: [id number]   PA Case ID: [id number] - Rx #: [id number]     Outcome: Approved Today  Case Id:[id number]; Status: Approved; Review Type: Prior Auth; Coverage Start Date:02/02/2020;Coverage End Date:03/03/2021;      Drug  Dronabinol 5MG capsules  Form  [employer] Electronic PA Form

## 2020-03-03 NOTE — TELEPHONE ENCOUNTER
Returned call to pt.   Pt stated that was informed Marinol rx was denied by insurance.   Informed pt that PA was submitted online this AM and Marinol now approved.   Provided pt with auth # and coverage dates.   Pt stated will callback if pharmacy gives him any trouble filling.   Nurse and pt verbalized understanding.        ----- Message from Galilea Barros sent at 3/3/2020  2:39 PM CST -----  Contact: Caller:  Wife / Krystin   tel:   [phone]  Caller says she has been waiting for a return call from you since yesterday.    Says she wants some answers about the CBD oil.  Says Cb told her it was denied.   Pls call ref. This and other problems today. As per wife.

## 2020-03-13 NOTE — TELEPHONE ENCOUNTER
Message fwd to .     ----- Message from Herlinda Escobar MD sent at 3/13/2020  2:00 PM CDT -----  Please see if he wants to do a virtual visit Monday with me

## 2020-03-16 NOTE — PROGRESS NOTES
Subjective:       Patient ID: Martin Alves is a 54 y.o. male.    Chief Complaint: No chief complaint on file.    HPI     The patient location is: home with wife  The chief complaint leading to consultation is: prostate cancer  Visit type: Virtual visit with synchronous audio and video  Total time spent with patient: 30 minutes  Each patient to whom he or she provides medical services by telemedicine is:  (1) informed of the relationship between the physician and patient and the respective role of any other health care provider with respect to management of the patient; and (2) notified that he or she may decline to receive medical services by telemedicine and may withdraw from such care at any time.    Notes: see below  Left hip pain over the weekend- crease of groin, got better over the weekend  Using cane to walk  Constant  Pain on left   Only area of pain  Abdominal pain resolved  Still notes some nausea  Appetite terrible- Marinol not helping      MRI [id number]    COMPARISON:  CT abdomen pelvis 02/20/2020, 01/09/2020, 11/11/2019, 08/07/2019.    FINDINGS:  Pulmonary Bases: demonstrate no gross abnormality.    Liver: Normal in size with multiple subcentimeter hepatic cyst, too small to characterize.  Additional 1.8 cm left hepatic lobe cyst.    Bile Ducts: No intra or extrahepatic biliary ductal dilatation.    Gallbladder: normal    Pancreas: The pancreas is located within the right hemiabdomen and appears stable when compared to prior imaging.  No peripancreatic inflammatory stranding.    Spleen: Lobular contour with few small splenules noted.    Proximal Gastrointestinal tract: normal.    Mesentery: normal    Adrenal Glands: Stable 2.0 cm right adrenal nodule which demonstrates loss of signal intensity on out of phase images compared with in phase images consistent with adrenal adenoma.  Stable thickening of the left adrenal gland.    Kidneys: Stable in size and location.  No nephrolithiasis or  hydroureteronephrosis.  The urinary bladder is unremarkable.    Aorta and Abdominal Vasculature: normal.    Lymph nodes: Multiple enhancing periaortic and bilateral iliac chain lymph nodes with associated restricted diffusion, similar to prior CT examination allowing for differences in modality, including index left periaortic node measuring approximately 0.9 mm (axial series 27, image 2) and right iliac chain lymph node measuring approximately 1.2 cm (axial series 21, image 102).    Body wall: normal    Other: Multiple enhancing lesions throughout the visualized thoracic and lumbar spine, sacrum, bilateral iliac bones, right femoral head/femoral neck.  There is abnormal edema within the left femoral head and left femoral neck, nonspecific.  Bone lesion of the right ischium.  There is post-contrast enhancement at the bilateral hip joints left more than right, consistent with nonspecific synovitis.  There is a small left hip joint effusion      Impression       1. Stable lymphadenopathy as above in this patient with history of metastatic prostate cancer.  2. Multiple enhancing lesions throughout the visualized spine, sacrum, iliac bones, right proximal femur better characterized on concomitant MR lumbar spine consistent with osseous metastatic disease.  3. Stable right adrenal adenoma.  4. Post-contrast synovial enhancement at the bilateral hip joints left more than right consistent with nonspecific inflammatory change.     COMPARISON:  CT abdomen pelvis 02/20/2020, 08/07/2019.    FINDINGS:  Alignment: Grade 1 retrolisthesis of L1 on L2, L2 on L3, and L5 on S1.    Vertebrae: There are multiple lesions throughout the lumbar spine which demonstrate decreased signal intensity on T1 weighted images with postcontrast heterogeneous enhancement.  There are wedge deformities involving the T12, L1 and L2 vertebral bodies, similar when compared to CT examination dated 08/07/2019.    No acute fracture identified.  No extension  of the disease toward the spinal canal or its contents.    Discs: Mild disc space narrowing at all levels.    Cord: Normal.  Conus terminates at L1.    Limited evaluation of the abdomen demonstrates multiple periaortic and iliac chain region  enlarged lymph nodes better characterized on prior CT examination dated 02/20/2020.  Stable nonspecific right adrenal nodule.    Degenerative findings:    T12-L1: No significant spinal canal stenosis or neural foraminal narrowing.    L1-L2: Circumferential disc bulge and bilateral facet hypertrophy without significant spinal canal stenosis or neural foraminal narrowing.    L2-L3: Circumferential disc bulge and bilateral facet hypertrophy without significant spinal canal stenosis or neural foraminal narrowing.    L3-L4: Circumferential disc bulge and bilateral facet hypertrophy with mild left-sided neural foraminal narrowing.  No significant spinal canal stenosis.    L4-L5: Circumferential disc bulge and bilateral facet hypertrophy with moderate left and mild right-sided neural foraminal narrowing.    L5-S1: There is a posterior right paracentral disc extrusion extruding cranially without significant spinal canal stenosis.  Bilateral facet hypertrophy contributes to mild bilateral neural foraminal narrowing.    Incidentally noted signal abnormality involving the posterior elements of C2, C4, C5 and the T1 vertebral body with extension into the posterior elements.      Impression       1. Multiple enhancing lesions throughout the lumbar spine consistent with osseous metastatic disease.  No extension into the spinal canal.  2. Chronic wedge deformities of the T12, L1, and L2 vertebral bodies.  No acute fracture.  3. Multilevel neural foraminal narrowing throughout the lumbar spine.  Individual disc levels detailed above.  4. Incidentally noted signal abnormality involving the posterior elements of the C2, C4, and C5 vertebral bodies.  Additional signal abnormality within the T1  "vertebral body with extension into the posterior elements.  5. Right paracentral disc protrusion L5-S1 causing no central canal stenosis.        Presents not feeling well  In the interval he was diagnosed with enteritis which has resolved  Ct scans with findings and worsening LAD- unclear if related to inflammation or worsening disease  He presents for cycle # 2 of Xofigo but clinically we are concerned about proceeding  Reports now pain in back and left hip 8/10  + weight loss  + nausea     Chief complaint:        Chief Complaint   Patient presents with    Prostate cancer metastatic to intrapelvic lymph node      Xgeva received 1/21/2020.   Post 1 cycle Xofigo     Oncologic History:  - 2015 having difficulty urinating- urgency and frequency  Saw Dr. Monson  PSA= 4.6 ng/ml and placed on Flomax  Advised to recheck in 1 month and it was 3.9 ng/ml  - early 2016 PSA- 2/16 = 7.8 ng/ml  - 2/16 prostate biopsy- told very aggressive Mar Lin=9  Placed on lupron q 3 months at that time (last 10/18, due again 1/19)  MRI prostate  CT scans- seen in LNs and base of bladder  Bone scan- negative  - told inoperable  - referred to Dr. Kirkland, medical oncology  - port placed 3/21/16  Initiated Taxotere q 3 weeks x 6 (started on 3/25/16)  - PSA dropped significantly   - plan was to rescan and check PSA after 3 cycles- delays with insurance so done instead after 2 chemotherapy treatments in 5/19/2016  Told "cancer 98% dead" Lns back to normal size  - 5/26/18 PSA= 0.01 ng/ml  - 5/27/18 3rd cycle of chemo began  - tolerated chemotherapy well - did receive neupogen x 3 days post  - completed 6 cycles 7/29/16  - was doing scans q 6 months and blood work every 3 months  - Dr. Hoskins left [facility] May 2017- notified by letter  - Dr. Costello-   - 8/17 scans and labs stable  - Dr. Sethi was assigned most recently as medical oncologist  - July 2018 PSA= 1.68 ng/ml  - 10/1518 CT scans and bone scan  - 10/22/18 saw Dr. Sethi  PSA 1.58 ng/ml  LNs " increased slightly on scans  Bone scan negative  Advised him to start Zytiga, started 11/6/18 (with Prednisone)  - concerned about medication and side effects  Feels like he did well on chemotherapy  Concerned about MD turnover  Sent to a chemo class and questions why for this regimen and then got a bill for a co-pay  - came here for a second opinion     -started Zytiga and Prednisone.   -progression noted recently and taxotere re-initiated.   -progression noted, plan to start Xofigo        HM:  Colonoscopy neg 1 yr ago        PMFSH: all information reviewed and updated as relevant to today's visit      Review of Systems   Constitutional: Positive for appetite change and unexpected weight change.   Eyes: Negative for visual disturbance.   Respiratory: Negative for cough and shortness of breath.    Cardiovascular: Negative for chest pain.   Gastrointestinal: Negative for abdominal pain and diarrhea.   Genitourinary: Negative for frequency.   Musculoskeletal: Positive for back pain.   Skin: Negative for rash.   Neurological: Negative for headaches.   Hematological: Negative for adenopathy.   Psychiatric/Behavioral: The patient is nervous/anxious.        Objective:      Physical Exam   Constitutional:   Resting on couch       Assessment:       1. 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 Bone metastases    3. Neoplasm related pain        Plan:       1. Discussed scans and plan  Rad Onc consult  Ellyn Vilchis  Dietician virtual consult    20 minutes total  No work now

## 2020-03-18 NOTE — TELEPHONE ENCOUNTER
Informed Patient  that [facility] received prescription for Xtandi and prior authorization is required.  [facility] will be back in touch once insurance determination is received.

## 2020-03-18 NOTE — TELEPHONE ENCOUNTER
Called patient to schedule xgeva visit for next week. Scheduled for Monday.      ----- Message from Herlinda Escobar MD sent at 3/18/2020  9:49 AM CDT -----  Rad Onc consult asap pain, risk of fracture  psa and cbc, cmp in 6 weeks  Xgeva to start next week

## 2020-03-20 NOTE — LETTER
March 20, 2020      Herlinda Escobar MD  [address] [facility] [address]           [facility]  [address]  [facility] [address]  Phone: [phone]          Patient: Martin Alves   MR Number: [id number]   YOB: 1965   Date of Visit: 3/20/2020       Dear Dr. Herlinda Escobar:    Thank you for referring Martin Alves to me for evaluation. Attached you will find relevant portions of my assessment and plan of care.    If you have questions, please do not hesitate to call me. I look forward to following Martin Alves along with you.    Sincerely,    Espinoza Young MD    Enclosure  CC:  No Recipients    If you would like to receive this communication electronically, please contact [email or web address] or [phone] to request more information on [facility] Link access.    For providers and/or their staff who would like to refer a patient to [facility], please contact us through our one-stop-shop provider referral line, [facility] , at [phone].    If you feel you have received this communication in error or would no longer like to receive these types of communications, please e-mail [email or web address]

## 2020-03-20 NOTE — PROGRESS NOTES
03/20/2020    Radiation Oncology Consultation    Assessment   This is a 54 y.o. 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 He is currently getting treated with Xofigo. He is referred for consideration of palliative RT.     I recommend treating the Left hip region 8 Gy x1. This has been demonstrated in randomized trials to provide equivalent pain relief to more extended palliative regimens for bone metastases. Potential short- and long-term side effects of radiation therapy were reviewed. At the end of our discussion, he was in agreement with proceeding with the recommended treatment. .        Plan   1) CT Simulation today for radiation treatment planning.        Chief Complaint   Patient presents with    Prostate Cancer     radiation cons       HPI: Mr. Alves is a 55 y/o male with metastatic castrate-resistant prostate cancer initially diagnosed in 2016. Most recently he has received 1-2 cycles of Xofigo. Bone Scan 11/2019 noted uptake in several regions but particularly in Left femur lesser trochanter and L3 vertebral body. MRI L-spine and Pelvis 3/10/20 demonstrated multiple bone mets throughout lumbar spine, sacrum, pelvis, and Left femur without evidence of acute fracture. He is referred for consideration of palliative RT.     In clinic today, the patient is accompanied by his wife. He reports Left hip pain that has been progressive over the past few months. It worsened recently, but that is somewhat improved by increasing pain medication, now 3-4/10. Denies focal weakness, numbness, tingling, in the lower extremities. Still able to ambulate.       Prior Radiation History: None    Past Medical History:   Diagnosis Date    Prostate cancer     Chemo in 2016       Past Surgical History:   Procedure Laterality Date    PORTACATH PLACEMENT         Social History     Tobacco Use    Smoking status: Former Smoker     Packs/day: 0.50     Years: 40.00     Pack  years: 20.00     Types: Cigarettes     Start date: 1978     Last attempt to quit: 2018     Years since quittin.3    Smokeless tobacco: Former User     Types: Chew     Quit date: 2017   Substance Use Topics    Alcohol use: Yes     Alcohol/week: 1.0 standard drinks     Types: 1 Cans of beer per week     Comment: Occasional    Drug use: No       Cancer-related family history is not on file.    Current Outpatient Medications on File Prior to Visit   Medication Sig Dispense Refill    calcium-vitamin D3 (OYSTER SHELL CALCIUM-VIT D3) 500 mg(1,250mg) -200 unit per tablet Take 1 tablet by mouth 2 (two) times daily. 180 tablet 3    HYDROcodone-acetaminophen (NORCO) 5-325 mg per tablet Take 1 tablet by mouth every 4 (four) hours as needed for Pain. 120 tablet 0    leuprolide (LUPRON) 1 mg/0.2 mL injection Inject into the skin every 3 (three) months. UNKNOWN DOSAGE      tamsulosin (FLOMAX) 0.4 mg Cap TK 1 C PO QD FOR BLADDER  11    benzonatate (TESSALON PERLES) 100 MG capsule Take 1 capsule (100 mg total) by mouth every 6 (six) hours as needed for Cough. (Patient not taking: Reported on 2/3/2020) 30 capsule 1    dronabinol (MARINOL) 5 MG capsule Take 1 capsule (5 mg total) by mouth 2 (two) times daily before meals. 60 capsule 1    fluticasone propionate (FLONASE) 50 mcg/actuation nasal spray 1 spray (50 mcg total) by Each Nostril route once daily. (Patient not taking: Reported on 2/3/2020) 9.9 mL 0    methocarbamol (ROBAXIN) 500 MG Tab TK 1 TO 2 TS PO HS PRN FOR PAINFUL MUSCLE STIFFNESS SPAM  0    metroNIDAZOLE (FLAGYL) 250 MG tablet Take 2 tablets (500 mg total) by mouth every 8 (eight) hours. 21 tablet 0    predniSONE (DELTASONE) 5 MG tablet TAKE 1 TABLET TWICE A DAY (Patient not taking: Reported on 1/10/2020) 60 tablet 3    traMADol (ULTRAM) 50 mg tablet Take 1 tablet (50 mg total) by mouth every 4 (four) hours as needed for Pain. (Patient not taking: Reported on 3/20/2020) 60 tablet 0     "valACYclovir (VALTREX) 500 MG tablet Take 1 tablet (500 mg total) by mouth 2 (two) times daily. 30 tablet 1    ZYTIGA 500 mg Tab TAKE 2 TABLETS (1000 MG) ONCE DAILY (Patient not taking: Reported on 1/10/2020) 60 tablet 3    [DISCONTINUED] enzalutamide (XTANDI) 40 mg Cap Take 160 mg by mouth once daily. 120 capsule 3     No current facility-administered medications on file prior to visit.        Review of patient's allergies indicates:  No Known Allergies    Review of Systems   Constitutional: Negative for fever and weight loss.   HENT: Negative for ear pain and sore throat.    Eyes: Negative for blurred vision and double vision.   Respiratory: Negative for cough, hemoptysis and shortness of breath.    Cardiovascular: Negative for chest pain and leg swelling.   Gastrointestinal: Positive for constipation. Negative for abdominal pain, diarrhea, heartburn and nausea.   Genitourinary: Negative for dysuria and hematuria.   Musculoskeletal: Positive for joint pain. Negative for falls.   Neurological: Negative for tingling, speech change, focal weakness, seizures and headaches.   Psychiatric/Behavioral: Negative for depression. The patient is not nervous/anxious.         Vital Signs: /81 (BP Location: Right arm)   Pulse 76   Temp 98.1 °F (36.7 °C) (Oral)   Resp 18   Ht 5' 9" (1.753 m)   Wt 75.9 kg (167 lb 4.8 oz)   SpO2 100%   BMI 24.71 kg/m²     ECOG Performance Status: 2 - Ambulates, capable of self care only    Physical Exam   Constitutional: He is oriented to person, place, and time and well-developed, well-nourished, and in no distress.   HENT:   Head: Normocephalic and atraumatic.   Mouth/Throat: Oropharynx is clear and moist.   Eyes: EOM are normal. No scleral icterus.   Neck: Normal range of motion. Neck supple.   Pulmonary/Chest: No accessory muscle usage. No respiratory distress.   Abdominal: Soft. Normal appearance. He exhibits no distension.   Musculoskeletal: Normal range of motion. He exhibits " no edema.   Lymphadenopathy:     He has no cervical adenopathy.        Right: No supraclavicular adenopathy present.        Left: No supraclavicular adenopathy present.   Neurological: He is alert and oriented to person, place, and time. No cranial nerve deficit. Gait normal.   Skin: Skin is warm and dry.   Psychiatric: Mood, affect and judgment normal.   Vitals reviewed.       Labs:    Imaging: I have personally reviewed the patient's available images and reports and summarized pertinent findings above in HPI.     Pathology: I have personally reviewed the patient's available pathology and summarized pertinent findings above in HPI.       - Thank you for allowing me to participate in the care of your patient.    Espinoza Young MD, PhD

## 2020-03-20 NOTE — TELEPHONE ENCOUNTER
DOCUMENTATION ONLY:  Prior authorization for Xtandi 40 mg Capsule #120/30 approved from 02/18/2020 to 03/19/2023  Case ID: [id number]    Co-pay: $100.00    Patient Assistance IS required. Sending to the financial assistance team to investigate assistance options. The patient has to fill Xtandi at [facility]    FOR DOCUMENTATION ONLY:  Financial Assistance for Xtandi approved from 03/20/2020 to 12/31/2021  Source: Xtandi Co-pay Card  BIN: [id number]  PCN: [id number]  ID: [id number]  GRP: [id number]  Co-pay with co-pay card:$ 0.00 [facility]

## 2020-04-03 NOTE — TELEPHONE ENCOUNTER
Called patient to discuss his x-ray with him. Let him know the the x-ray did not show any fractures, but did ask that we refer to the MRI. I spoke with Dr. Escobar she wanted me to reach out to rad/onc. I called Dr. Young and his nurse Rosaura, I also sent a message to Leslie Barrera in Rad Onc, but no one answered. I told patient I would reach out to them again on Monday and I would call him and speak with him about his pain. He did note that his pain was better when I called him this afternoon. I did let him know if the pain gets worse or if he can't bare weight on his leg, then he does need to go to the ER.    I will follow up with the patient on Monday.

## 2020-04-03 NOTE — PROGRESS NOTES
Subjective:       Patient ID: Martin Alves is a 54 y.o. male.    Chief Complaint:   URGENT CARE VISIT    The patient location is: his home with wife  The chief complaint leading to consultation is: new onset right hip pain started yesterday  Visit type: Virtual visit with synchronous audio and video  Total time spent with patient: 20 minutes between pre and post visit and visit with patient    Each patient to whom he or she provides medical services by telemedicine is:  (1) informed of the relationship between the physician and patient and the respective role of any other health care provider with respect to management of the patient; and (2) notified that he or she may decline to receive medical services by telemedicine and may withdraw from such care at any time.      HPI Patient doing a telemedicine visit for acute hip pain. Right hip pain started at yesterday and and has increased in the amount of pain, states it felt like someone was stabbing a knife in the hip. He is moving slightly better, pain is worse with movement. Uncomfortable at all times, but when moving he does have a lot of pain. He has taken his pain medication it will decrease pain from a 10 out of 10 to a 5 out of 10. - He has had issues with left hip and had been favoring the right hip due to pain in the left hip. He did have radiation to his left hip for the pain. He is a prostate cancer patient, who is known to have bone mets throughout the iliac bones.          Review of Systems   Constitutional: Positive for unexpected weight change (decreased). Negative for appetite change and fatigue (moving less due to hip pain).   Eyes: Negative for visual disturbance.   Respiratory: Negative for cough and shortness of breath.    Cardiovascular: Negative for chest pain.   Gastrointestinal: Negative for abdominal pain and diarrhea.   Genitourinary: Negative for frequency.   Musculoskeletal: Negative for back pain.   Skin: Negative for rash.    Neurological: Negative for headaches.   Hematological: Negative for adenopathy.   Psychiatric/Behavioral: The patient is not nervous/anxious.        Objective:      Physical Exam   Constitutional: He is oriented to person, place, and time. He appears well-developed and well-nourished.   Pulmonary/Chest: Effort normal.   Neurological: He is alert and oriented to person, place, and time.   Psychiatric: He has a normal mood and affect. His behavior is normal. Judgment and thought content normal.     Limited due to virtual visit  Assessment:       1. 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 Bone metastases    3. Neoplasm related pain        Plan:     1-2. Continue current plan with Dr. Escobar, see her last note  3. Continue current pain regimen    - Will order an x-ray of the right hip.Will follow up with patient on hip x-ray.      Medication List with Changes/Refills   Current Medications    BENZONATATE (TESSALON PERLES) 100 MG CAPSULE    Take 1 capsule (100 mg total) by mouth every 6 (six) hours as needed for Cough.    CALCIUM-VITAMIN D3 (OYSTER SHELL CALCIUM-VIT D3) 500 MG(1,250MG) -200 UNIT PER TABLET    Take 1 tablet by mouth 2 (two) times daily.    DRONABINOL (MARINOL) 5 MG CAPSULE    Take 1 capsule (5 mg total) by mouth 2 (two) times daily before meals.    ENZALUTAMIDE (XTANDI) 40 MG CAP    Take 160 mg by mouth once daily.    FLUTICASONE PROPIONATE (FLONASE) 50 MCG/ACTUATION NASAL SPRAY    1 spray (50 mcg total) by Each Nostril route once daily.    HYDROCODONE-ACETAMINOPHEN (NORCO) 5-325 MG PER TABLET    Take 1 tablet by mouth every 4 (four) hours as needed for Pain.    LEUPROLIDE (LUPRON) 1 MG/0.2 ML INJECTION    Inject into the skin every 3 (three) months. UNKNOWN DOSAGE    METHOCARBAMOL (ROBAXIN) 500 MG TAB    TK 1 TO 2 TS PO HS PRN FOR PAINFUL MUSCLE STIFFNESS SPAM    METRONIDAZOLE (FLAGYL) 250 MG TABLET    Take 2 tablets (500 mg total) by mouth every 8 (eight) hours.    PREDNISONE  (DELTASONE) 5 MG TABLET    TAKE 1 TABLET TWICE A DAY    TAMSULOSIN (FLOMAX) 0.4 MG CAP    TK 1 C PO QD FOR BLADDER    TRAMADOL (ULTRAM) 50 MG TABLET    Take 1 tablet (50 mg total) by mouth every 4 (four) hours as needed for Pain.    VALACYCLOVIR (VALTREX) 500 MG TABLET    Take 1 tablet (500 mg total) by mouth 2 (two) times daily.    ZYTIGA 500 MG TAB    TAKE 2 TABLETS (1000 MG) ONCE DAILY       Patient is in agreement with the proposed treatment plan. All questions were answered to the patient's satisfaction. Patient knows to call clinic for any new or worsening symptoms and if anything is needed before the next clinic visit.          Rosario Grant, JOSELYNP-C  Hematology & Medical Oncology   [facility] [place] [address]  ph. [phone]  Fax. [phone]    Patient dicussed with collaborating physician, Dr. Escobar.

## 2020-04-16 NOTE — TELEPHONE ENCOUNTER
"Returned call to pt.   Pt stated was informed by Rosario Grant at last visit on 4/3 that she would check with Dr. Young for recommendations on xray of right hip and has not heard back yet.   Pt stated that hip pain comes and goes.   Informed pt would check with Rosario and f/u.   Pt verbalized understanding.     Message fwd.         ----- Message from Bell Glynn sent at 4/16/2020  3:47 PM CDT -----  Contact: Krystin  Consult/Advisory:    Name Of Caller: Krystin  Provider Name: Dr. Escobar  Does patient feel the need to be seen today? No  Relationship to the Pt?: Wife  Contact Preference?: [phone]  What is the nature of the call?:  Patient's wife states he is having pain in other hip.  Please contact to discuss    Additional Notes:  "Thank you for all that you do for our patients'"        "

## 2020-04-17 NOTE — PROGRESS NOTES
2020    Radiation Oncology Follow-Up Visit    Prior Radiation History: 8 Gy in 1 fraction to left hip, 3/23/20 at [facility] [place]    HPI: This is a 54 y.o. y/o male with metastatic castrate-resistant prostate cancer originally presenting in our department in 3/20 with painful left hip bone mets, who completed palliative XRT 8 Gy in 1 fraction on 3/23/20 with Dr. Young, returning today for follow up appointment. He is referred back for discussion of palliative radiotherapy to painful right hip bone mets.    Today, he notes intermittent right hip pain, 5/10, and worsened with movement. He denies weakness, numbness, or tingling.    Past Medical History:   Diagnosis Date    Prostate cancer     Chemo in 2016       Past Surgical History:   Procedure Laterality Date    PORTACATH PLACEMENT         Social History     Tobacco Use    Smoking status: Former Smoker     Packs/day: 0.50     Years: 40.00     Pack years: 20.00     Types: Cigarettes     Start date: 1978     Last attempt to quit: 2018     Years since quittin.4    Smokeless tobacco: Former User     Types: Chew     Quit date: 2017   Substance Use Topics    Alcohol use: Yes     Alcohol/week: 1.0 standard drinks     Types: 1 Cans of beer per week     Comment: Occasional    Drug use: No       Cancer-related family history is not on file.    Current Outpatient Medications on File Prior to Visit   Medication Sig Dispense Refill    benzonatate (TESSALON PERLES) 100 MG capsule Take 1 capsule (100 mg total) by mouth every 6 (six) hours as needed for Cough. 30 capsule 1    calcium-vitamin D3 (OYSTER SHELL CALCIUM-VIT D3) 500 mg(1,250mg) -200 unit per tablet Take 1 tablet by mouth 2 (two) times daily. 180 tablet 3    dronabinol (MARINOL) 5 MG capsule Take 1 capsule (5 mg total) by mouth 2 (two) times daily before meals. 60 capsule 1    enzalutamide (XTANDI) 40 mg Cap Take 160 mg by mouth once daily. 120 capsule 3    fluticasone propionate (FLONASE) 50  mcg/actuation nasal spray 1 spray (50 mcg total) by Each Nostril route once daily. 9.9 mL 0    HYDROcodone-acetaminophen (NORCO) 5-325 mg per tablet Take 1 tablet by mouth every 4 (four) hours as needed for Pain. 120 tablet 0    leuprolide (LUPRON) 1 mg/0.2 mL injection Inject into the skin every 3 (three) months. UNKNOWN DOSAGE      megestroL (MEGACE) 40 MG Tab Take 1 tablet (40 mg total) by mouth 2 (two) times daily. 60 tablet 3    methocarbamol (ROBAXIN) 500 MG Tab TK 1 TO 2 TS PO HS PRN FOR PAINFUL MUSCLE STIFFNESS SPAM  0    metroNIDAZOLE (FLAGYL) 250 MG tablet Take 2 tablets (500 mg total) by mouth every 8 (eight) hours. 21 tablet 0    predniSONE (DELTASONE) 5 MG tablet TAKE 1 TABLET TWICE A DAY 60 tablet 3    tamsulosin (FLOMAX) 0.4 mg Cap TK 1 C PO QD FOR BLADDER  11    traMADol (ULTRAM) 50 mg tablet Take 1 tablet (50 mg total) by mouth every 4 (four) hours as needed for Pain. 60 tablet 0    valACYclovir (VALTREX) 500 MG tablet Take 1 tablet (500 mg total) by mouth 2 (two) times daily. 30 tablet 1    ZYTIGA 500 mg Tab TAKE 2 TABLETS (1000 MG) ONCE DAILY 60 tablet 3     No current facility-administered medications on file prior to visit.        Review of patient's allergies indicates:  No Known Allergies    Review of Systems   Constitutional: Negative for fever, malaise/fatigue and weight loss.   HENT: Negative for hearing loss and sore throat.    Eyes: Negative for pain.   Respiratory: Negative for cough and shortness of breath.    Cardiovascular: Negative for chest pain.   Gastrointestinal: Negative for abdominal pain, blood in stool, nausea and vomiting.   Genitourinary: Negative for dysuria and hematuria.   Musculoskeletal: Positive for joint pain. Negative for back pain and neck pain.   Skin: Negative for rash.   Neurological: Negative for dizziness, weakness and headaches.   Psychiatric/Behavioral: Negative for depression. The patient is not nervous/anxious.         Vital Signs:   Vitals:     04/20/20 1036   BP: 122/81   Pulse: 82   Resp: 18   Temp: 98.3 °F (36.8 °C)         ECOG Performance Status: 1 - Ambulates, capable of light work    Physical exam:  Constitutional:  Well-developed, well-nourished and in no acute distress.  Head: Normocephalic, atraumatic.    Eyes: Sclerae are non-icteric.  Pupils are equal and round.  Extraocular movements are intact.  ENMT: Oral cavity and oropharynx are without lesions. Mucous membranes are moist.  Neck: Supple.  No palpable cervical or supraclavicular adenopathy.   Pulmonary: Clear to auscultation, bilaterally.  No rhonchi, rales or wheezes.  Cardiovascular: Regular rate and rhythm.   No murmurs heard.  No edema.  GI: Soft, nontender and nondistended.  No palpable masses or hepatosplenomegaly.  Musculoskeletal: No palpable spinous or paraspinous tenderness.  Range of motion appears normal in all 4 extremities.  Lymphatics: No palpable cervical, supraclavicular, axillary adenopathy.  Extremities: No clubbing, cyanosis, or edema.    Skin: No visible lesions.  Neurologic: CN II-XII are grossly intact.  Motor strength is 5/5 in bilateral upper and lower extremities and symmetric.    Sensory exam is grossly intact to touch.  Gait is normal.    Psychiatric: Patient is alert and oriented x 3.  Normal mood and affect.     Labs: I have personally reviewed the patient's available labs and reports and summarized pertinent findings above in HPI.    Imaging: I have personally reviewed the patient's available images and reports and summarized pertinent findings above in HPI.     Pathology: I have personally reviewed the patient's available pathology and summarized pertinent findings above in HPI.     Assessment:  This is a 54 y.o. y/o male with metastatic castrate-resistant prostate cancer, recently receiving palliative XRT for L hip bone mets, now presenting today for treatment discussion regarding painful R hip bone mets.    Plan:  He is an appropriate candidate for  palliative radiotherapy to the right hip, 8 Gy in one fraction. He is scheduled for CT simulation today. I discussed the various aspects of treatment and potential side effects and he agreed to pursue treatment and signed consent. He has our contact information should further questions arise.      Kishor Blount MD  Radiation Oncology  [facility]

## 2020-04-17 NOTE — NURSING
04/17/2020      Phoned the patient.      Discussed the following with the patient:  In an effort to protect our immunocompromised patients from potential exposure to COVID-19, [facility] will now require all patients receiving an infusion, an injection, and/or radiation therapy to be tested for COVID-19 prior to their appointment.  All patients currently under treatment will be tested immediately, and patients initiating new treatment cycles or with one-time appointments (injections, transfusions, etc.) must be tested within 72 hours of their appointment.      Symptom Patient's Response   Fever YES/NO: no   Cough YES/NO: no   Shortness of breath  YES/NO: no   Difficulty breathing YES/NO: no   GI symptoms such as diarrhea or nausea YES/NO: no   Loss of taste YES/NO: no   Loss of smell YES/NO: no     Other Screening Patient's Response   Has the patient previously undergone COVID-19 testing? YES/NO: no     If yes to the question directly above, what was the result? not applicable   Has the patient been in close contact with someone who has undergone COVID-19 testing? YES/NO: no     If yes to the question directly above, what was the result? not applicable      The patient's 24-hour COVID-19 test was ordered and scheduled.  Reviewed the appointment date, time, and location with the patient.      Advised the patient that he can expect the results to take approximately 24 hours and that someone will reach out to him regarding his results.  Advised the patient that his treatment appointment will be rescheduled if he tests positive for COVID-19.      Questions were answered to the patient's satisfaction, and the patient verbalized understanding of information and agreement with the plan.       The above was completed in accordance with instructions and guidelines set forth by [facility].     Signed,        Vi Barrera RN     Date:  04/17/2020

## 2020-05-06 NOTE — TELEPHONE ENCOUNTER
Returned call to pt.   Pt stated that he was calling to see what his next steps should be.   Pt stated has had 2 radiation treatments.   Pt reports that his appetite is better and has RLS at night (takes pain med to relieve).   Informed pt would discuss with NP as Dr. Escobar out the office and f/u.   Pt verbalized understanding.       Message fwd.             ----- Message from Corey Rodriguez sent at 5/6/2020 11:29 AM CDT -----  Contact: Krystin (spouse)  Patient Advice/Staff Message     Caller name: Krystin    Reason for call: Calling to touch bases with the doctor to discuss labs and next steps in pt treatment.    Do you feel you need to be seen today:: No        Communication Preference: [phone]    Additional Information:

## 2020-05-11 NOTE — TELEPHONE ENCOUNTER
----- Message from Rosario Grant NP sent at 5/11/2020  4:13 PM CDT -----  Please schedule patient for Xgeva ASAP - he will need a COVID test as well

## 2020-05-11 NOTE — PROGRESS NOTES
Subjective:       Patient ID: Martin Alves is a 54 y.o. male.    Chief Complaint:   URGENT CARE VISIT    The patient location is: his home with wife  The chief complaint leading to consultation is: new onset right hip pain started yesterday  Visit type: Virtual visit with synchronous audio and video  Total time spent with patient: 20 minutes between pre and post visit and visit with patient    Each patient to whom he or she provides medical services by telemedicine is:  (1) informed of the relationship between the physician and patient and the respective role of any other health care provider with respect to management of the patient; and (2) notified that he or she may decline to receive medical services by telemedicine and may withdraw from such care at any time.      HPI Patient doing a telemedicine visit. He notes that he has been feeling bad. He notes that he is feeling poor. He notes for the last 2-3 weeks he has been in such pain that he has needed pain medication to go to sleep. His thighs are restless and he has trouble sleeping. States that his hips are doing well. Both him and his wife states that he is having a rough time.      Right hip pain started at yesterday and and has increased in the amount of pain, states it felt like someone was stabbing a knife in the hip. He is moving slightly better, pain is worse with movement. Uncomfortable at all times, but when moving he does have a lot of pain. He has taken his pain medication it will decrease pain from a 10 out of 10 to a 5 out of 10. - He has had issues with left hip and had been favoring the right hip due to pain in the left hip. He did have radiation to his left hip for the pain. He is a prostate cancer patient, who is known to have bone mets throughout the iliac bones.          Review of Systems   Constitutional: Positive for fatigue (moving less due to hip pain) and unexpected weight change (on Megace; improving ). Negative for appetite  change.   Eyes: Negative for visual disturbance.   Respiratory: Negative for cough and shortness of breath.    Cardiovascular: Negative for chest pain.   Gastrointestinal: Negative for abdominal pain and diarrhea.   Genitourinary: Negative for frequency.   Musculoskeletal: Negative for back pain.        Restless leg syndrome   Skin: Negative for rash.   Neurological: Negative for headaches.   Hematological: Negative for adenopathy.   Psychiatric/Behavioral: The patient is not nervous/anxious.        Objective:      Physical Exam   Constitutional: He is oriented to person, place, and time. He appears well-developed and well-nourished.   Pulmonary/Chest: Effort normal.   Neurological: He is alert and oriented to person, place, and time.   Psychiatric: He has a normal mood and affect. His behavior is normal. Judgment and thought content normal.     Limited due to virtual visit  Assessment:       1. 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 Bone metastases    3. Neoplasm related pain    4. Antineoplastic chemotherapy induced anemia    5. Screening for unspecified condition    6. Restless leg syndrome        Plan:     1-2. Continue with Xtandi; needs monthly xgeva injection  3. Continue PRN pain medication  4. Monitored this week; stable  5. Restless leg syndrome - will start a trial of pamiprexole     - Will touch base with Dr. Escobar and get back to patient        Medication List with Changes/Refills   Current Medications    BENZONATATE (TESSALON PERLES) 100 MG CAPSULE    Take 1 capsule (100 mg total) by mouth every 6 (six) hours as needed for Cough.    CALCIUM-VITAMIN D3 (OYSTER SHELL CALCIUM-VIT D3) 500 MG(1,250MG) -200 UNIT PER TABLET    Take 1 tablet by mouth 2 (two) times daily.    DRONABINOL (MARINOL) 5 MG CAPSULE    Take 1 capsule (5 mg total) by mouth 2 (two) times daily before meals.    ENZALUTAMIDE (XTANDI) 40 MG CAP    Take 160 mg by mouth once daily.    FLUTICASONE PROPIONATE (FLONASE) 50  MCG/ACTUATION NASAL SPRAY    1 spray (50 mcg total) by Each Nostril route once daily.    HYDROCODONE-ACETAMINOPHEN (NORCO) 5-325 MG PER TABLET    Take 1 tablet by mouth every 4 (four) hours as needed for Pain.    LEUPROLIDE (LUPRON) 1 MG/0.2 ML INJECTION    Inject into the skin every 3 (three) months. UNKNOWN DOSAGE    MEGESTROL (MEGACE) 40 MG TAB    Take 1 tablet (40 mg total) by mouth 2 (two) times daily.    METHOCARBAMOL (ROBAXIN) 500 MG TAB    TK 1 TO 2 TS PO HS PRN FOR PAINFUL MUSCLE STIFFNESS SPAM    METRONIDAZOLE (FLAGYL) 250 MG TABLET    Take 2 tablets (500 mg total) by mouth every 8 (eight) hours.    PREDNISONE (DELTASONE) 5 MG TABLET    TAKE 1 TABLET TWICE A DAY    TAMSULOSIN (FLOMAX) 0.4 MG CAP    TK 1 C PO QD FOR BLADDER    TRAMADOL (ULTRAM) 50 MG TABLET    Take 1 tablet (50 mg total) by mouth every 4 (four) hours as needed for Pain.    VALACYCLOVIR (VALTREX) 500 MG TABLET    Take 1 tablet (500 mg total) by mouth 2 (two) times daily.    ZYTIGA 500 MG TAB    TAKE 2 TABLETS (1000 MG) ONCE DAILY       Patient is in agreement with the proposed treatment plan. All questions were answered to the patient's satisfaction. Patient knows to call clinic for any new or worsening symptoms and if anything is needed before the next clinic visit.          Rosario Grant, FNP-C  Hematology & Medical Oncology   [facility] [place] [address]  ph. [phone]  Fax. [phone]    Patient dicussed with collaborating physician, Dr. Escobar.

## 2020-05-13 NOTE — NURSING
Pt arrived for xgeva.  Pt states no jaw pain or dental issues.  Labs reviewed with pt, pt states taking 2 calcium pills a day.  Pt tolerated injection SQ to RLA.  Discharged to home.

## 2020-05-18 NOTE — TELEPHONE ENCOUNTER
Called patient to determine if his restless leg syndrome was better on the mirapex. He states there has been no change in his symptoms. He will stop the Mirapex.  He notes that he is still losing weight despite eating. He was encouraged to use Ensure and supplements. If he continues to lose weight we can refer him to dietary.   ----- Message from Rosario Grant NP sent at 5/11/2020  4:14 PM CDT -----  Restless leg Syndrome

## 2020-05-22 NOTE — PROGRESS NOTES
"Subjective:       Patient ID: Martin Alves is a 54 y.o. male.    Chief Complaint: No chief complaint on file.    HPI     The patient location is: home  The chief complaint leading to consultation is: follow up metastatic prostate cancer    Visit type: audiovisual    Face to Face time with patient: 15 minutes  20 minutes of total time spent on the encounter, which includes face to face time and non-face to face time preparing to see the patient (eg, review of tests), Obtaining and/or reviewing separately obtained history, Documenting clinical information in the electronic or other health record, Independently interpreting results (not separately reported) and communicating results to the patient/family/caregiver, or Care coordination (not separately reported).         Each patient to whom he or she provides medical services by telemedicine is:  (1) informed of the relationship between the physician and patient and the respective role of any other health care provider with respect to management of the patient; and (2) notified that he or she may decline to receive medical services by telemedicine and may withdraw from such care at any time.    Notes:   Reports still losing weight  Received Lupron at Urology recently  153 lbs last week and now 155 lbs  States eating pretty well, using Megace (Marinol did not work)  Prior weight at [facility] was 161 lbs  He has been speaking with Renetta Hallman, the dietician, and has a virtual set up to further discuss    States pain is "not bad at all"  Hip improved post XRT  States restless leg continues- medication attempted not working  Uses pain pill at night to assist with sleeping at night  He is hoping not to have to keep doing this - prefers Tramadol   Plans to try Melatonin to see if it helps  Now can ambulate without cane  No real pain, notes really more stiffness  Wife noting posture issue     Reports nausea resolved  Normal stools    Working from home  His boss is " going to keep him at home    MRI [id number]         COMPARISON:  CT abdomen pelvis 02/20/2020, 01/09/2020, 11/11/2019, 08/07/2019.    FINDINGS:  Pulmonary Bases: demonstrate no gross abnormality.    Liver: Normal in size with multiple subcentimeter hepatic cyst, too small to characterize.  Additional 1.8 cm left hepatic lobe cyst.    Bile Ducts: No intra or extrahepatic biliary ductal dilatation.    Gallbladder: normal    Pancreas: The pancreas is located within the right hemiabdomen and appears stable when compared to prior imaging.  No peripancreatic inflammatory stranding.    Spleen: Lobular contour with few small splenules noted.    Proximal Gastrointestinal tract: normal.    Mesentery: normal    Adrenal Glands: Stable 2.0 cm right adrenal nodule which demonstrates loss of signal intensity on out of phase images compared with in phase images consistent with adrenal adenoma.  Stable thickening of the left adrenal gland.    Kidneys: Stable in size and location.  No nephrolithiasis or hydroureteronephrosis.  The urinary bladder is unremarkable.    Aorta and Abdominal Vasculature: normal.    Lymph nodes: Multiple enhancing periaortic and bilateral iliac chain lymph nodes with associated restricted diffusion, similar to prior CT examination allowing for differences in modality, including index left periaortic node measuring approximately 0.9 mm (axial series 27, image 2) and right iliac chain lymph node measuring approximately 1.2 cm (axial series 21, image 102).    Body wall: normal    Other: Multiple enhancing lesions throughout the visualized thoracic and lumbar spine, sacrum, bilateral iliac bones, right femoral head/femoral neck.  There is abnormal edema within the left femoral head and left femoral neck, nonspecific.  Bone lesion of the right ischium.  There is post-contrast enhancement at the bilateral hip joints left more than right, consistent with nonspecific synovitis.  There is a small left hip joint  effusion       Impression         1. Stable lymphadenopathy as above in this patient with history of metastatic prostate cancer.  2. Multiple enhancing lesions throughout the visualized spine, sacrum, iliac bones, right proximal femur better characterized on concomitant MR lumbar spine consistent with osseous metastatic disease.  3. Stable right adrenal adenoma.  4. Post-contrast synovial enhancement at the bilateral hip joints left more than right consistent with nonspecific inflammatory change.           COMPARISON:  CT abdomen pelvis 02/20/2020, 08/07/2019.    FINDINGS:  Alignment: Grade 1 retrolisthesis of L1 on L2, L2 on L3, and L5 on S1.    Vertebrae: There are multiple lesions throughout the lumbar spine which demonstrate decreased signal intensity on T1 weighted images with postcontrast heterogeneous enhancement.  There are wedge deformities involving the T12, L1 and L2 vertebral bodies, similar when compared to CT examination dated 08/07/2019.  No acute fracture identified.  No extension of the disease toward the spinal canal or its contents.  Discs: Mild disc space narrowing at all levels.  Cord: Normal.  Conus terminates at L1.  Limited evaluation of the abdomen demonstrates multiple periaortic and iliac chain region  enlarged lymph nodes better characterized on prior CT examination dated 02/20/2020.  Stable nonspecific right adrenal nodule.  Degenerative findings:  T12-L1: No significant spinal canal stenosis or neural foraminal narrowing.  L1-L2: Circumferential disc bulge and bilateral facet hypertrophy without significant spinal canal stenosis or neural foraminal narrowing.  L2-L3: Circumferential disc bulge and bilateral facet hypertrophy without significant spinal canal stenosis or neural foraminal narrowing.  L3-L4: Circumferential disc bulge and bilateral facet hypertrophy with mild left-sided neural foraminal narrowing.  No significant spinal canal stenosis.  L4-L5: Circumferential disc bulge and  bilateral facet hypertrophy with moderate left and mild right-sided neural foraminal narrowing.  L5-S1: There is a posterior right paracentral disc extrusion extruding cranially without significant spinal canal stenosis.  Bilateral facet hypertrophy contributes to mild bilateral neural foraminal narrowing.  Incidentally noted signal abnormality involving the posterior elements of C2, C4, C5 and the T1 vertebral body with extension into the posterior elements.       Impression         1. Multiple enhancing lesions throughout the lumbar spine consistent with osseous metastatic disease.  No extension into the spinal canal.  2. Chronic wedge deformities of the T12, L1, and L2 vertebral bodies.  No acute fracture.  3. Multilevel neural foraminal narrowing throughout the lumbar spine.  Individual disc levels detailed above.  4. Incidentally noted signal abnormality involving the posterior elements of the C2, C4, and C5 vertebral bodies.  Additional signal abnormality within the T1 vertebral body with extension into the posterior elements.  5. Right paracentral disc protrusion L5-S1 causing no central canal stenosis.         Chief complaint:        Chief Complaint   Patient presents with    Prostate cancer metastatic to intrapelvic lymph node      Xgeva received 1/21/2020.   Post 1 cycle Xofigo     Oncologic History:  - 2015 having difficulty urinating- urgency and frequency  Saw Dr. Monson  PSA= 4.6 ng/ml and placed on Flomax  Advised to recheck in 1 month and it was 3.9 ng/ml  - early 2016 PSA- 2/16 = 7.8 ng/ml  - 2/16 prostate biopsy- told very aggressive Erika=9  Placed on lupron q 3 months at that time (last 10/18, due again 1/19)  MRI prostate  CT scans- seen in LNs and base of bladder  Bone scan- negative  - told inoperable  - referred to Dr. Kirkland, medical oncology  - port placed 3/21/16  Initiated Taxotere q 3 weeks x 6 (started on 3/25/16)  - PSA dropped significantly   - plan was to rescan and check PSA  "after 3 cycles- delays with insurance so done instead after 2 chemotherapy treatments in 5/19/2016  Told "cancer 98% dead" Lns back to normal size  - 5/26/18 PSA= 0.01 ng/ml  - 5/27/18 3rd cycle of chemo began  - tolerated chemotherapy well - did receive neupogen x 3 days post  - completed 6 cycles 7/29/16  - was doing scans q 6 months and blood work every 3 months  - Dr. Hoskins left [facility] May 2017- notified by letter  - Dr. Costello-   - 8/17 scans and labs stable  - Dr. Sethi was assigned most recently as medical oncologist  - July 2018 PSA= 1.68 ng/ml  - 10/1518 CT scans and bone scan  - 10/22/18 saw Dr. Sethi  PSA 1.58 ng/ml  LNs increased slightly on scans  Bone scan negative  Advised him to start Zytiga, started 11/6/18 (with Prednisone)  - concerned about medication and side effects  Feels like he did well on chemotherapy  Concerned about MD turnover  Sent to a chemo class and questions why for this regimen and then got a bill for a co-pay  - came here for a second opinion     -started Zytiga and Prednisone.   -progression noted recently and taxotere re-initiated.   -progression noted, plan to start Xofigo        HM:  Colonoscopy neg 1 yr ago       HPI Patient doing a telemedicine visit. He notes that he has been feeling bad. He notes that he is feeling poor. He notes for the last 2-3 weeks he has been in such pain that he has needed pain medication to go to sleep. His thighs are restless and he has trouble sleeping. States that his hips are doing well. Both him and his wife states that he is having a rough time.       Right hip pain started at yesterday and and has increased in the amount of pain, states it felt like someone was stabbing a knife in the hip. He is moving slightly better, pain is worse with movement. Uncomfortable at all times, but when moving he does have a lot of pain. He has taken his pain medication it will decrease pain from a 10 out of 10 to a 5 out of 10. - He has had issues with left " hip and had been favoring the right hip due to pain in the left hip. He did have radiation to his left hip for the pain. He is a prostate cancer patient, who is known to have bone mets throughout the iliac bones.     This is a 54 y.o. 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 He is currently getting treated with Xofigo. He is referred for consideration of palliative RT.      I recommend treating the Left hip region 8 Gy x1. This has been demonstrated in randomized trials to provide equivalent pain relief to more extended palliative regimens for bone metastases. Potential short- and long-term side effects of radiation therapy were reviewed. At the end of our discussion, he was in agreement with proceeding with the recommended treatment. .     Review of Systems   Constitutional: Positive for fatigue and unexpected weight change. Negative for activity change, appetite change, chills and fever.   HENT: Negative for congestion.    Respiratory: Negative for cough, shortness of breath and wheezing.    Neurological: Positive for weakness (generalized). Negative for dizziness, light-headedness and headaches.       Objective:      Physical Exam    Assessment:       1. 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 Anemia, unspecified type    3. Bone metastases    4. Neoplasm related pain        Plan:       1,3. Continue current therapy  We did discuss concerns with rising PSA  However clinically improved  We discussed repeat scans after next clinical assessment in person  PT  Nutrition consult  RTC 4 weeks

## 2020-05-25 NOTE — PROGRESS NOTES
In response to in baseket msg from Dr. Escobar, [facility] left detailed VM for pt regarding setting up outpatient PT.  [facility] will follow up upon return phone call from pt.

## 2020-05-26 NOTE — PROGRESS NOTES
[facility] called pt and explained that outpatient PT had been ordered for him by Dr. Escobar.  He said he would like to go to the new facility that was recently built near his home, near [facility].  SW called facility and it is just for children.  The closest facility to pt's home is [place].  Called pt, gave him number to central PT scheduling ([phone]) and explained that he should call schedule appt.   He asked about cost and [facility] explained that this is something he should discuss with PT.  [facility] provided name and contact information and encouraged pt to call with any future needs.

## 2020-06-04 NOTE — TELEPHONE ENCOUNTER
Communicated with pt via [facility]     ----- Message from Dominick Santa DNP sent at 6/4/2020  1:13 PM CDT -----  Izabella-  The patient's COVID test came back negative, and he can therefore proceed with his appointments as scheduled unless otherwise contraindicated.  Please make sure the patient is aware.  Thanks!  -Dominick

## 2020-06-04 NOTE — TELEPHONE ENCOUNTER
Called pt and scheduled for rapid covid swab for admission.   Call to admit and reservation placed with Dominick--- [facility] #[id number]--- Dominick to call pt when bed ready.   S/w Lorenzo (Oncology Charge) and beds available at this time---nurse to call back with COVID results.      ----- Message from Herlinda Escobar MD sent at 6/4/2020  9:45 AM CDT -----  Contact: Wife  Called back  Reports went backwards  Pain upper right side- ribs to back he states   States no pain in spine area  States right below breast area  Also reports left hip is hurting again as well  Gets worse if takes a deep breath    Admits that he felt better at last virtual visit  Last week was very active and did cut grass and did unload sand [place] [employer]  Thought it was pulled muscles  He states started yesterday and wife states got worse yesterday  Wife admits had not called as just started and he had been doing better  She states unfortunately he overworked and she is worried that this is when it started  Tramadol works better than the other pain medication    Discussed pain management and reviewed pros and cons of inpatient versus outpatient  We discussed if he goes inpatient he requires a COVID swab which we can do here in clinic  Will place admit order now and need them to call them when bed ready    Wife tearful and we were again honest about prognosis and we will be honest about if any other options  He can certainly have better pain control and potential palliative XRT  She understands and asks again about prostate removal- she had also discussed with his previous oncologist- we reiterated why this provides no survival or palliative advantage and she understands  She is just very frigthened      ----- Message -----  From: Izabella Mares  Sent: 6/4/2020   8:54 AM CDT  To: Herlinda Escobar MD    Sending as states only wants to speak with you.   Looks like you saw on 5/22 and plan was to repeat scans and have 4 week f/u in person.   I have not received  any messages from them since 5/12.      ----- Message -----  From: Ankita Escobar  Sent: 6/4/2020   8:45 AM CDT  To: Shawn BORJA Staff    Wife is calling to speak with Dr. Escobar only.  Wife is upset and says they have not heard from Dr. Escobar for several weeks and the pt is experiencing pain.  She says he cannot sleep and has been keeping her up at nights.  She is requesting Dr. Escobar give her a returned call, as soon as possible because something needs to be done.    She can be reached at [phone].    Thank you.

## 2020-06-04 NOTE — PROGRESS NOTES
Izabella-  The patient's COVID test came back negative, and he can therefore proceed with his appointments as scheduled unless otherwise contraindicated.  Please make sure the patient is aware.  Thanks!  -Dominick

## 2020-06-04 NOTE — TELEPHONE ENCOUNTER
Message fwd to MD.       ----- Message from Ankita Escobar sent at 6/4/2020  8:45 AM CDT -----  Contact: Wife  Wife is calling to speak with Dr. Escobar only.  Wife is upset and says they have not heard from Dr. Escobar for several weeks and the pt is experiencing pain.  She says he cannot sleep and has been keeping her up at nights.  She is requesting Dr. Escobar give her a returned call, as soon as possible because something needs to be done.    She can be reached at [phone].    Thank you.

## 2020-06-04 NOTE — PROGRESS NOTES
Subjective:       Patient ID: Martin Alves is a 54 y.o. male.    Chief Complaint: Prostate Cancer and Follow-up    HPI     The patient location is: home  The chief complaint leading to consultation is: follow up metastatic prostate cancer    Visit type: audiovisual    Face to Face time with patient: 15 minutes  20 minutes of total time spent on the encounter, which includes face to face time and non-face to face time preparing to see the patient (eg, review of tests), Obtaining and/or reviewing separately obtained history, Documenting clinical information in the electronic or other health record, Independently interpreting results (not separately reported) and communicating results to the patient/family/caregiver, or Care coordination (not separately reported).         Each patient to whom he or she provides medical services by telemedicine is:  (1) informed of the relationship between the physician and patient and the respective role of any other health care provider with respect to management of the patient; and (2) notified that he or she may decline to receive medical services by telemedicine and may withdraw from such care at any time.    Notes: Recently admitted for pain control, please see hospital discharge note.   Receives Lupron at Urology recently.  Notes that his weight loss is stable he is around 150 lbs - eating well, still on Megace. He has been speaking with Renetta Hallman, the dietician, and has a virtual set up to further discuss  Notes that he is not in severe pain, but he does have some discomfort. Pain manageable on pain medications.   He has the goal of wanting to return to work. Referred to palliative care      Family feels very discouraged by communication with oncology team.     Reports nausea resolved  Normal stools    Working from home  His boss is going to keep him at home    MRI [id number]         COMPARISON:  CT abdomen pelvis 02/20/2020, 01/09/2020, 11/11/2019,  08/07/2019.    FINDINGS:  Pulmonary Bases: demonstrate no gross abnormality.    Liver: Normal in size with multiple subcentimeter hepatic cyst, too small to characterize.  Additional 1.8 cm left hepatic lobe cyst.    Bile Ducts: No intra or extrahepatic biliary ductal dilatation.    Gallbladder: normal    Pancreas: The pancreas is located within the right hemiabdomen and appears stable when compared to prior imaging.  No peripancreatic inflammatory stranding.    Spleen: Lobular contour with few small splenules noted.    Proximal Gastrointestinal tract: normal.    Mesentery: normal    Adrenal Glands: Stable 2.0 cm right adrenal nodule which demonstrates loss of signal intensity on out of phase images compared with in phase images consistent with adrenal adenoma.  Stable thickening of the left adrenal gland.    Kidneys: Stable in size and location.  No nephrolithiasis or hydroureteronephrosis.  The urinary bladder is unremarkable.    Aorta and Abdominal Vasculature: normal.    Lymph nodes: Multiple enhancing periaortic and bilateral iliac chain lymph nodes with associated restricted diffusion, similar to prior CT examination allowing for differences in modality, including index left periaortic node measuring approximately 0.9 mm (axial series 27, image 2) and right iliac chain lymph node measuring approximately 1.2 cm (axial series 21, image 102).    Body wall: normal    Other: Multiple enhancing lesions throughout the visualized thoracic and lumbar spine, sacrum, bilateral iliac bones, right femoral head/femoral neck.  There is abnormal edema within the left femoral head and left femoral neck, nonspecific.  Bone lesion of the right ischium.  There is post-contrast enhancement at the bilateral hip joints left more than right, consistent with nonspecific synovitis.  There is a small left hip joint effusion       Impression         1. Stable lymphadenopathy as above in this patient with history of metastatic prostate  cancer.  2. Multiple enhancing lesions throughout the visualized spine, sacrum, iliac bones, right proximal femur better characterized on concomitant MR lumbar spine consistent with osseous metastatic disease.  3. Stable right adrenal adenoma.  4. Post-contrast synovial enhancement at the bilateral hip joints left more than right consistent with nonspecific inflammatory change.           COMPARISON:  CT abdomen pelvis 02/20/2020, 08/07/2019.    FINDINGS:  Alignment: Grade 1 retrolisthesis of L1 on L2, L2 on L3, and L5 on S1.    Vertebrae: There are multiple lesions throughout the lumbar spine which demonstrate decreased signal intensity on T1 weighted images with postcontrast heterogeneous enhancement.  There are wedge deformities involving the T12, L1 and L2 vertebral bodies, similar when compared to CT examination dated 08/07/2019.  No acute fracture identified.  No extension of the disease toward the spinal canal or its contents.  Discs: Mild disc space narrowing at all levels.  Cord: Normal.  Conus terminates at L1.  Limited evaluation of the abdomen demonstrates multiple periaortic and iliac chain region  enlarged lymph nodes better characterized on prior CT examination dated 02/20/2020.  Stable nonspecific right adrenal nodule.  Degenerative findings:  T12-L1: No significant spinal canal stenosis or neural foraminal narrowing.  L1-L2: Circumferential disc bulge and bilateral facet hypertrophy without significant spinal canal stenosis or neural foraminal narrowing.  L2-L3: Circumferential disc bulge and bilateral facet hypertrophy without significant spinal canal stenosis or neural foraminal narrowing.  L3-L4: Circumferential disc bulge and bilateral facet hypertrophy with mild left-sided neural foraminal narrowing.  No significant spinal canal stenosis.  L4-L5: Circumferential disc bulge and bilateral facet hypertrophy with moderate left and mild right-sided neural foraminal narrowing.  L5-S1: There is a  "posterior right paracentral disc extrusion extruding cranially without significant spinal canal stenosis.  Bilateral facet hypertrophy contributes to mild bilateral neural foraminal narrowing.  Incidentally noted signal abnormality involving the posterior elements of C2, C4, C5 and the T1 vertebral body with extension into the posterior elements.       Impression         1. Multiple enhancing lesions throughout the lumbar spine consistent with osseous metastatic disease.  No extension into the spinal canal.  2. Chronic wedge deformities of the T12, L1, and L2 vertebral bodies.  No acute fracture.  3. Multilevel neural foraminal narrowing throughout the lumbar spine.  Individual disc levels detailed above.  4. Incidentally noted signal abnormality involving the posterior elements of the C2, C4, and C5 vertebral bodies.  Additional signal abnormality within the T1 vertebral body with extension into the posterior elements.  5. Right paracentral disc protrusion L5-S1 causing no central canal stenosis.         Chief complaint:        Chief Complaint   Patient presents with    Prostate cancer metastatic to intrapelvic lymph node      Xgeva received 1/21/2020.   Post 1 cycle Xofigo     Oncologic History:  - 2015 having difficulty urinating- urgency and frequency  Saw Dr. Monson  PSA= 4.6 ng/ml and placed on Flomax  Advised to recheck in 1 month and it was 3.9 ng/ml  - early 2016 PSA- 2/16 = 7.8 ng/ml  - 2/16 prostate biopsy- told very aggressive [place]=9  Placed on lupron q 3 months at that time (last 10/18, due again 1/19)  MRI prostate  CT scans- seen in LNs and base of bladder  Bone scan- negative  - told inoperable  - referred to Dr. Kirkland, medical oncology  - port placed 3/21/16  Initiated Taxotere q 3 weeks x 6 (started on 3/25/16)  - PSA dropped significantly   - plan was to rescan and check PSA after 3 cycles- delays with insurance so done instead after 2 chemotherapy treatments in 5/19/2016  Told "cancer 98% " "dead" Lns back to normal size  - 5/26/18 PSA= 0.01 ng/ml  - 5/27/18 3rd cycle of chemo began  - tolerated chemotherapy well - did receive neupogen x 3 days post  - completed 6 cycles 7/29/16  - was doing scans q 6 months and blood work every 3 months  - Dr. Hoskins left [facility] May 2017- notified by letter  - Dr. Costello-   - 8/17 scans and labs stable  - Dr. Sethi was assigned most recently as medical oncologist  - July 2018 PSA= 1.68 ng/ml  - 10/1518 CT scans and bone scan  - 10/22/18 saw Dr. Sethi  PSA 1.58 ng/ml  LNs increased slightly on scans  Bone scan negative  Advised him to start Zytiga, started 11/6/18 (with Prednisone)  - concerned about medication and side effects  Feels like he did well on chemotherapy  Concerned about MD turnover  Sent to a chemo class and questions why for this regimen and then got a bill for a co-pay  - came here for a second opinion     -started Zytiga and Prednisone.   -progression noted recently and taxotere re-initiated.   -progression noted, plan to start Xofigo        HM:  Colonoscopy neg 1 yr ago       HPI Patient doing a telemedicine visit. He notes that he has been feeling bad. He notes that he is feeling poor. He notes for the last 2-3 weeks he has been in such pain that he has needed pain medication to go to sleep. His thighs are restless and he has trouble sleeping. States that his hips are doing well. Both him and his wife states that he is having a rough time.       Right hip pain started at yesterday and and has increased in the amount of pain, states it felt like someone was stabbing a knife in the hip. He is moving slightly better, pain is worse with movement. Uncomfortable at all times, but when moving he does have a lot of pain. He has taken his pain medication it will decrease pain from a 10 out of 10 to a 5 out of 10. - He has had issues with left hip and had been favoring the right hip due to pain in the left hip. He did have radiation to his left hip for the " pain. He is a prostate cancer patient, who is known to have bone mets throughout the iliac bones.     This is a 54 y.o. 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 He is currently getting treated with Xofigo. He is referred for consideration of palliative RT.      I recommend treating the Left hip region 8 Gy x1. This has been demonstrated in randomized trials to provide equivalent pain relief to more extended palliative regimens for bone metastases. Potential short- and long-term side effects of radiation therapy were reviewed. At the end of our discussion, he was in agreement with proceeding with the recommended treatment. .     Review of Systems   Constitutional: Positive for fatigue and unexpected weight change. Negative for activity change, appetite change, chills, diaphoresis and fever.   HENT: Negative for congestion, mouth sores, nosebleeds, sore throat and trouble swallowing.    Respiratory: Negative for cough, shortness of breath and wheezing.    Cardiovascular: Negative for chest pain, palpitations and leg swelling.   Gastrointestinal: Negative for abdominal distention, abdominal pain, blood in stool, constipation, diarrhea, nausea and vomiting.   Genitourinary: Negative for hematuria.   Musculoskeletal: Negative for arthralgias, back pain and myalgias.   Skin: Negative for pallor and rash.   Allergic/Immunologic: Negative for immunocompromised state.   Neurological: Positive for weakness (generalized). Negative for dizziness, light-headedness, numbness and headaches.   Hematological: Negative for adenopathy. Does not bruise/bleed easily.   Psychiatric/Behavioral: Negative for confusion. The patient is not nervous/anxious.        Objective:      Physical Exam  Constitutional:       Appearance: Normal appearance.   Pulmonary:      Effort: Pulmonary effort is normal.   Neurological:      General: No focal deficit present.      Mental Status: He is alert and  oriented to person, place, and time.   Psychiatric:         Mood and Affect: Mood normal.         Behavior: Behavior normal.         Thought Content: Thought content normal.         Judgment: Judgment normal.         Assessment:       1. 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 Bone metastases    3. Neoplasm related pain        Plan:       1,2. Continue Xtandi; Lupron injection  Follow up on Monday with Dr. Escobar to discuss next steps as PSA continues to rise    3. Will refill pain medication today    Referred to palliative care     Insurance company not paying hospital stay for pain control.     Return to clinic in 1 week with MD appointment.     Patient is in agreement with the proposed treatment plan. All questions were answered to the patient's satisfaction. Patient knows to call clinic for any new or worsening symptoms and if anything is needed before the next clinic visit.          SOLEDAD Fair-C  Hematology & Medical Oncology   [address] [place] [id number]  ph. [phone]  Fax. [phone]    Patient dicussed with collaborating physician, Dr. Escobar.

## 2020-06-05 PROBLEM — G25.81 RESTLESS LEG SYNDROME, UNCONTROLLED: Status: ACTIVE | Noted: 2020-01-01

## 2020-06-05 PROBLEM — E83.39 HYPOPHOSPHATEMIA: Status: ACTIVE | Noted: 2020-01-01

## 2020-06-05 NOTE — NURSING
Plan of care discussed with patient on admission. Resting quietly with eyes closed. Respirations even, unlabored. Skin warm and dry. Pain managed with PRN narcotics. Sodium phosphate IV given this shift. Denies nausea. Vital signs stable. Frequent checks for pain and safety maintained. Bed in lowest position, wheels locked, call light in reach. Instructed to call for assistance as needed, verbalizes understanding. Will continue to monitor.

## 2020-06-05 NOTE — ASSESSMENT & PLAN NOTE
Home Medication: Pramipexole 0.125 mg nightly. Patient reports persistent symptoms.   - will increase dose to 0.250 mg nightly and continue to titrate medication up weekly until symptoms are controlled   - melatonin nightly PRN for sleep aide

## 2020-06-05 NOTE — PLAN OF CARE
POC reviewed with patient; understanding verbalized. CT of chest, abdomen, and pelvis complete. PRN Dilaudid X1 administered for pain; pt transitioned to PO pain medication. Pt remains independent. Regular diet with good appetite. Voids clear, yellow urine via the urinal; no noted BM this shift. Pt. with nonskid footwear on, bed in lowest position, and locked with bed rails up x2.  Pt. has call light and personal items within reach.  VSS and afebrile this shift. All questions and concerns addressed at this time. Will continue to monitor.

## 2020-06-05 NOTE — ASSESSMENT & PLAN NOTE
Prostate cancer metastatic to intrapelvic lymph node  Bone metastases    Patient w/ know metastatic lesions throughout pelvis and hips presenting w/ progressively worsening and uncontrolled pain     - c/w Tramadol 50 mg q4hrs for moderate pain   - Dilaudid 1 mg q6hrs for severe pain  - will transition IV PRN medication to PO medication when appropriate   - will consider addition of long acting PO opiate for better pain control   - start gabapentin 100 mg TID and titrate medication to desired effect without increasing somnolence   - c/w enzalutamide 100 mg QD; is not on formulary; patient brought his medication to continue while in hospital   - c/w megestrol for appetite stimulus   - c/w Ca-Vit D BID   - PT/OT

## 2020-06-05 NOTE — PT/OT/SLP EVAL
"Occupational Therapy   Evaluation and Discharge Note    Name: Martin Alves  MRN: [id number]  Admitting Diagnosis:  Neoplasm related pain      Recommendations:     Discharge Recommendations: home  Discharge Equipment Recommendations:  none  Barriers to discharge:  None    Assessment:     Martin Alves is a 54 y.o. male with a medical diagnosis of Neoplasm related pain. At this time, patient is functioning at their prior level of function and does not require further acute OT services.     Plan:     During this hospitalization, patient does not require further acute OT services.  Please re-consult if situation changes.    · Plan of Care Reviewed with: patient    Subjective   "The pain was bad the other day but not today."  Chief Complaint: Pt with no complaints during session  Patient/Family Comments/goals: To return home     Occupational Profile:  Living Environment: Pt lives in a raised [facility] with 5 RAJIV in front with BHR and 3 RAJIV in the back with BHR.  Pt states he uses HR while ascending and descending steps.  Pt has a tub/shower combo and a standard toilet with no DME. Pt uses sturdy towel rack for balance if needed and uses the sink next to the toilet for transfers.  Pt reports he "fell/knelt" out of bed in early March due to hip pain.  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  · Pain Rating 1: 0/10  · Pain Rating Post-Intervention 1: 0/10    Patients cultural, spiritual, Yazdanism conflicts given the current situation: no    Objective:     Communicated with: RN prior to session.  Patient found supine with (no active lines) upon OT entry to room.    General Precautions: " Standard, fall   Orthopedic Precautions:N/A   Braces: N/A     Occupational Performance:    Bed Mobility:    · Patient completed Rolling/Turning to Right with independence  · Patient completed Supine to Sit with independence  · Patient completed Sit to Supine with independence    Functional Mobility/Transfers:  · Patient completed Sit <> Stand Transfer from EOB with independence  with  no assistive device   · Patient completed Toilet Transfer Step Transfer technique to bathroom toilet with modified independence with  grab bars  · Functional Mobility: Pt ambulated a functional household distance from EOB, to bathroom, down the singh, and back to EOB with Elizabeth with no assistive device, requiring no rest breaks and with no LOB.    Activities of Daily Living:  · Lower Body Dressing: independence to doff and adry B socks while seated EOB and to adry/doff B slippers while standing     Cognitive/Visual Perceptual:  Cognitive/Psychosocial Skills:     -       Oriented to: Person, Place, Time and Situation   -       Follows Commands/attention:Follows multistep  commands  -       Communication: clear/fluent  -       Memory: No Deficits noted  -       Safety awareness/insight to disability: intact   -       Mood/Affect/Coping skills/emotional control: Appropriate to situation    Physical Exam:  Upper Extremity Range of Motion:     -       Right Upper Extremity: WFL  -       Left Upper Extremity: WFL  Upper Extremity Strength:    -       Right Upper Extremity: WFL  -       Left Upper Extremity: WFL   Strength:    -       Right Upper Extremity: WFL  -       Left Upper Extremity: WFL  Fine Motor Coordination:    -       Intact    AMPAC 6 Click ADL:  AMPAC Total Score: 24    Treatment & Education:  -Pt educated on role of OT, POC, benefit of performing out of bed activity, and safety when performing functional transfers and while ambulating.    -White board updated  Education:    Patient left supine with call button in  reach    GOALS:   Multidisciplinary Problems     Occupational Therapy Goals     Not on file          Multidisciplinary Problems (Resolved)        Problem: Occupational Therapy Goal    Goal Priority Disciplines Outcome Interventions   Occupational Therapy Goal   (Resolved)     OT, PT/OT Met                    History:     Past Medical History:   Diagnosis Date    Prostate cancer     Chemo in 2016       Past Surgical History:   Procedure Laterality Date    PORTACATH PLACEMENT         Time Tracking:     OT Date of Treatment: 06/05/20  OT Start Time: 1402  OT Stop Time: 1421  OT Total Time (min): 19 min    Billable Minutes:Evaluation 10 min.  Therapeutic Activity 9 min.    Jacobo Jordan, OT  6/5/2020

## 2020-06-05 NOTE — HPI
Patient is a 53 yo M with [facility] prostate cancer metastatic to intrapelvic lymph nodes ([place] 9) s/p Palliative Radiation who presents to Oncology service as a direct admit. He is a patients of Dr. Solano who is a direct admit following home communication. He reports upper right sided rib pain which radiates towards his back. He denies pain in his spinal area. He reports it being just below his right breast area. He also endorses left hip pain. Pain in his ribs as well as his hip increases with deep breathes. He reports that last week he was very active, able to cut grass, unload sand, but since yesterday, his pain and level of activity has increased. He reports more relief with Tramadol. Dr. Fitch had a long discussion with patient and his wife prior to admission. Wife is understanding of prognosis. He denies fevers, chills, N/V, diarrhea, constipation, abdominal pain.

## 2020-06-05 NOTE — PT/OT/SLP EVAL
Physical Therapy Evaluation and Discharge Note    Patient Name:  Martin Alves   MRN:  [id number]    Recommendations:     Discharge Recommendations:  home   Discharge Equipment Recommendations: none   Barriers to discharge: None    Assessment:     Martin Alves is a 54 y.o. male admitted with a medical diagnosis of Neoplasm related pain. .  At this time, patient is functioning at their prior level of function and does not require further acute PT services.     Recent Surgery: * No surgery found *      Plan:     During this hospitalization, patient does not require further acute PT services.  Please re-consult if situation changes.      Subjective     Chief Complaint: LBP  Patient/Family Comments/goals: to go home  Pain/Comfort:  · Pain Rating 1: 5/10  · Location - Orientation 1: lower  · Location 1: back  · Pain Addressed 1: Pre-medicate for activity, Reposition, Cessation of Activity  · Pain Rating Post-Intervention 1: 5/10    Patients cultural, spiritual, Restorationist conflicts given the current situation: no    Living Environment:  Pt. Lives with spouse and daughter in [facility] with 5 RAJIV and (B) handrails  Prior to admission, patients level of function was indep.  Equipment used at home: cane, quad.  Upon discharge, patient will have assistance from family.    Objective:     Communicated with nursing prior to session.  Patient found supine with peripheral IV upon PT entry to room.    General Precautions: Standard, fall   Orthopedic Precautions:N/A   Braces:       Exams:  · RLE ROM: WFL  · RLE Strength: WFL  · LLE ROM: WFL  · LLE Strength: WFL    Functional Mobility:  · Bed Mobility:     · Rolling Right: modified independence  · Scooting: modified independence  · Supine to Sit: modified independence  · Sit to Supine: modified independence  · Transfers:     · Sit to Stand:  modified independence with no AD  · Gait: 400' with Mod. indep. without AD or LOB  · Balance: good  · Stairs:  Pt ascended/descended  10 stair(s) with No Assistive Device with left handrail and right handrail with Modified Independent.     AM-PAC 6 CLICK MOBILITY  Total Score:24       Therapeutic Activities and Exercises:   Discussed therapy needs, goals, and POC    AM-PAC 6 CLICK MOBILITY  Total Score:24     Patient left supine with all lines intact and call button in reach.    GOALS:   Multidisciplinary Problems     Physical Therapy Goals     Not on file          Multidisciplinary Problems (Resolved)        Problem: Physical Therapy Goal    Goal Priority Disciplines Outcome Goal Variances Interventions   Physical Therapy Goal   (Resolved)     PT, PT/OT Met                     History:     Past Medical History:   Diagnosis Date    Prostate cancer     Chemo in 2016       Past Surgical History:   Procedure Laterality Date    PORTACATH PLACEMENT         Time Tracking:     PT Received On: 06/05/20  PT Start Time: 1135     PT Stop Time: 1149  PT Total Time (min): 14 min     Billable Minutes: Evaluation 14      Rafi Becker, PT  06/05/2020

## 2020-06-05 NOTE — H&P
[facility]  Hematology/Oncology  H&P    Patient Name: Martin Alves  MRN: [id number]  Admission Date: 6/4/2020  Code Status: Full Code   Attending Provider: Concetta Stinson MD  Primary Care Physician: Sudhir Tenorio MD  Principal Problem:Neoplasm related pain    Subjective:     HPI: Patient is a 55 yo M with prostate cancer metastatic to intrapelvic lymph nodes (Erika 9) s/p Palliative Radiation who presents to Oncology service as a direct admit from home with uncontrolled pain 2/2 metastatic bone lesions. He is a patients of Dr. Solano who is a direct admit following home communication. He reports upper right sided rib pain which radiates towards his back. He denies pain in his spinal area. He reports it being just below his right breast area as well. He reports right hip pain that he describes as a stabbing pain that is exacerbated with movement. He reports that his left hip pain is unchanged. Pain in his ribs as well as his hip increases with deep breathes. He reports that last week he was very active, able to cut grass, unload sand, but since yesterday, his pain has increased and level of activity has decreased. He states that his pain is severe at the end of the day and that he requires his pain medication in order to sleep. He reports more relief with Tramadol. Dr. Escobar had a long discussion with patient and his wife prior to admission. Wife is understanding of prognosis. He denies fevers, chills, N/V, diarrhea, constipation, abdominal pain.      Oncology Treatment Plan:   [No treatment plan]    Medications:  Continuous Infusions:  Scheduled Meds:   calcium-vitamin D3  1 tablet Oral BID    enoxaparin  40 mg Subcutaneous Daily    enzalutamide  160 mg Oral Daily    gabapentin  100 mg Oral TID    megestroL  40 mg Oral BID    polyethylene glycol  17 g Oral Daily    pramipexole  0.125 mg Oral Nightly     PRN Meds:acetaminophen, dextrose 10 % in water (D10W), dextrose 10 % in  water (D10W), glucagon (human recombinant), glucose, glucose, HYDROmorphone, melatonin, ondansetron, prochlorperazine, sodium chloride 0.9%, traMADoL     Review of patient's allergies indicates:  No Known Allergies     Past Medical History:   Diagnosis Date    Prostate cancer     Chemo in      Past Surgical History:   Procedure Laterality Date    PORTACATH PLACEMENT       Family History     Problem Relation (Age of Onset)    Cerebral aneurysm Maternal Aunt    Colon cancer Paternal Grandmother    Emphysema Mother    Heart attack Father    No Known Problems Brother, Maternal Uncle, Paternal Aunt, Paternal Uncle, Maternal Grandmother, Maternal Grandfather, Paternal Grandfather, Brother, Son, Son        Tobacco Use    Smoking status: Former Smoker     Packs/day: 0.50     Years: 40.00     Pack years: 20.00     Types: Cigarettes     Start date: 1978     Last attempt to quit: 2018     Years since quittin.5    Smokeless tobacco: Former User     Types: Chew     Quit date: 2017   Substance and Sexual Activity    Alcohol use: Yes     Alcohol/week: 1.0 standard drinks     Types: 1 Cans of beer per week     Comment: Occasional    Drug use: No    Sexual activity: Never       Review of Systems   Constitutional: Positive for activity change (decreased). Negative for chills, fatigue and fever.   HENT: Negative for congestion, sore throat and trouble swallowing.    Eyes: Negative for photophobia, pain and visual disturbance.   Respiratory: Negative for cough, chest tightness, shortness of breath and wheezing.    Cardiovascular: Negative for chest pain and palpitations.   Gastrointestinal: Negative for abdominal pain, diarrhea, nausea and vomiting.   Genitourinary: Negative for difficulty urinating, dysuria, frequency and hematuria.   Musculoskeletal: Positive for arthralgias and back pain. Negative for gait problem, myalgias, neck pain and neck stiffness.   Skin: Negative for pallor, rash and wound.    Neurological: Negative for dizziness, syncope, weakness, light-headedness, numbness and headaches.   Psychiatric/Behavioral: Negative for agitation, behavioral problems and confusion.     Objective:     Vital Signs (Most Recent):  Temp: 98.2 °F (36.8 °C) (06/04/20 2330)  Pulse: 80 (06/04/20 2330)  Resp: 16 (06/04/20 2330)  BP: 119/83 (06/04/20 2330)  SpO2: 97 % (06/04/20 2330) Vital Signs (24h Range):  Temp:  [98.2 °F (36.8 °C)-98.4 °F (36.9 °C)] 98.2 °F (36.8 °C)  Pulse:  [80-88] 80  Resp:  [16-18] 16  SpO2:  [96 %-97 %] 97 %  BP: (119)/(74-83) 119/83     Weight: 69.6 kg (153 lb 5.3 oz)  Body mass index is 22.64 kg/m².  Body surface area is 1.84 meters squared.      Intake/Output Summary (Last 24 hours) at 6/5/2020 0202  Last data filed at 6/4/2020 2357  Gross per 24 hour   Intake 240 ml   Output 1 ml   Net 239 ml       Physical Exam   Constitutional: He is oriented to person, place, and time. He appears well-developed and well-nourished. No distress.   HENT:   Head: Normocephalic and atraumatic.   Nose: Nose normal.   Eyes: Conjunctivae and EOM are normal. No scleral icterus.   Neck: Normal range of motion. Neck supple. No JVD present.   Cardiovascular: Normal rate, regular rhythm, normal heart sounds and intact distal pulses.   Pulmonary/Chest: Effort normal and breath sounds normal. No respiratory distress. He has no wheezes.   Abdominal: Soft. Bowel sounds are normal. He exhibits no distension. There is no tenderness. There is no rebound.   Musculoskeletal: Normal range of motion. He exhibits no edema, tenderness or deformity.   Neurological: He is alert and oriented to person, place, and time.   Skin: Skin is warm and dry. No rash noted. He is not diaphoretic. No erythema. No pallor.   Psychiatric: He has a normal mood and affect. His behavior is normal. Judgment and thought content normal.   Vitals reviewed.      Significant Labs:   CBC:   Recent Labs   Lab 06/04/20  2354   WBC 9.96   HGB 10.4*   HCT 32.2*       , CMP:   Recent Labs   Lab 06/04/20  2354   *   K 4.0      CO2 27   *   BUN 7   CREATININE 0.7   CALCIUM 8.5*   PROT 6.7   ALBUMIN 3.0*   BILITOT 0.7   ALKPHOS 770*   AST 19   ALT 10   ANIONGAP 4*   EGFRNONAA >60.0    and All pertinent labs from the last 24 hours have been reviewed.    Diagnostic Results:  None    Assessment/Plan:     Neoplasm related pain  Prostate cancer metastatic to intrapelvic lymph node  Bone metastases    Patient w/ know metastatic lesions throughout pelvis and hips presenting w/ progressively worsening and uncontrolled pain     - c/w Tramadol 50 mg q4hrs for moderate pain   - Dilaudid 1 mg q6hrs for severe pain  - will transition IV PRN medication to PO medication when appropriate   - will consider addition of long acting PO opiate for better pain control   - start gabapentin 100 mg TID and titrate medication to desired effect without increasing somnolence   - c/w enzalutamide 100 mg QD; is not on formulary; patient brought his medication to continue while in hospital   - c/w megestrol for appetite stimulus   - c/w Ca-Vit D BID   - PT/OT     Prostate cancer metastatic to intrapelvic lymph node  See Neoplasm related pain    Bone metastases  See Neoplasm related pain    Restless leg syndrome, uncontrolled  Home Medication: Pramipexole 0.125 mg nightly. Patient reports persistent symptoms.   - will increase dose to 0.250 mg nightly and continue to titrate medication up weekly until symptoms are controlled   - melatonin nightly PRN for sleep aide     Hypophosphatemia  Phos 1.3 w/ admission labs   - daily Phos w/ CMP  - replace PRN, goal 3.0         Lexie Jacob MD  Hematology/Oncology  [facility]

## 2020-06-05 NOTE — HOSPITAL COURSE
Mr. Alves was admitted to medical oncology for pain control. He was started on IV Dilaudid with good response. He was de-escalated to Oxycodone 10 q4h which was later reduced to Oxycodone 5mg q4hrs and provided adequate relief. A CT A/P was performed to evaluate for alternate causes of uncontrolled pain and it was significantly unchanged except for a new small R-sided pleural thickening that would not likely cause the pain he was experiencing. Hx and physical findings favor a muscular sprain as the cause of the pain. Following two days of providing an IV and later a PO pain medication regimen, he was stable for discharge home. He was prescribed oxycodone 5mg q4hrs as it adequately controlled his pain while inpatient.

## 2020-06-05 NOTE — HPI
Patient is a 53 yo M with prostate cancer metastatic to intrapelvic lymph nodes (Erika 9) s/p Palliative Radiation who presents to Oncology service as a direct admit from home with uncontrolled pain 2/2 metastatic bone lesions. He is a patients of Dr. Solano who is a direct admit following home communication. He reports upper right sided rib pain which radiates towards his back. He denies pain in his spinal area. He reports it being just below his right breast area as well. He reports right hip pain that he describes as a stabbing pain that is exacerbated with movement. He reports that his left hip pain is unchanged. Pain in his ribs as well as his hip increases with deep breathes. He reports that last week he was very active, able to cut grass, unload sand, but since yesterday, his pain has increased and level of activity has decreased. He states that his pain is severe at the end of the day and that he requires his pain medication in order to sleep. He reports more relief with Tramadol. Dr. Escobar had a long discussion with patient and his wife prior to admission. Wife is understanding of prognosis. He denies fevers, chills, N/V, diarrhea, constipation, abdominal pain.

## 2020-06-05 NOTE — SUBJECTIVE & OBJECTIVE
Oncology Treatment Plan:   [No treatment plan]    Medications:  Continuous Infusions:  Scheduled Meds:   calcium-vitamin D3  1 tablet Oral BID    enoxaparin  40 mg Subcutaneous Daily    enzalutamide  160 mg Oral Daily    gabapentin  100 mg Oral TID    megestroL  40 mg Oral BID    polyethylene glycol  17 g Oral Daily    pramipexole  0.125 mg Oral Nightly     PRN Meds:acetaminophen, dextrose 10 % in water (D10W), dextrose 10 % in water (D10W), glucagon (human recombinant), glucose, glucose, HYDROmorphone, melatonin, ondansetron, prochlorperazine, sodium chloride 0.9%, traMADoL     Review of patient's allergies indicates:  No Known Allergies     Past Medical History:   Diagnosis Date    Prostate cancer     Chemo in 2016     Past Surgical History:   Procedure Laterality Date    PORTACATH PLACEMENT       Family History     Problem Relation (Age of Onset)    Cerebral aneurysm Maternal Aunt    Colon cancer Paternal Grandmother    Emphysema Mother    Heart attack Father    No Known Problems Brother, Maternal Uncle, Paternal Aunt, Paternal Uncle, Maternal Grandmother, Maternal Grandfather, Paternal Grandfather, Brother, Son, Son        Tobacco Use    Smoking status: Former Smoker     Packs/day: 0.50     Years: 40.00     Pack years: 20.00     Types: Cigarettes     Start date: 1978     Last attempt to quit: 2018     Years since quittin.5    Smokeless tobacco: Former User     Types: Chew     Quit date: 2017   Substance and Sexual Activity    Alcohol use: Yes     Alcohol/week: 1.0 standard drinks     Types: 1 Cans of beer per week     Comment: Occasional    Drug use: No    Sexual activity: Never       Review of Systems   Constitutional: Positive for activity change (decreased). Negative for chills, fatigue and fever.   HENT: Negative for congestion, sore throat and trouble swallowing.    Eyes: Negative for photophobia, pain and visual disturbance.   Respiratory: Negative for cough, chest  tightness, shortness of breath and wheezing.    Cardiovascular: Negative for chest pain and palpitations.   Gastrointestinal: Negative for abdominal pain, diarrhea, nausea and vomiting.   Genitourinary: Negative for difficulty urinating, dysuria, frequency and hematuria.   Musculoskeletal: Positive for arthralgias and back pain. Negative for gait problem, myalgias, neck pain and neck stiffness.   Skin: Negative for pallor, rash and wound.   Neurological: Negative for dizziness, syncope, weakness, light-headedness, numbness and headaches.   Psychiatric/Behavioral: Negative for agitation, behavioral problems and confusion.     Objective:     Vital Signs (Most Recent):  Temp: 98.2 °F (36.8 °C) (06/04/20 2330)  Pulse: 80 (06/04/20 2330)  Resp: 16 (06/04/20 2330)  BP: 119/83 (06/04/20 2330)  SpO2: 97 % (06/04/20 2330) Vital Signs (24h Range):  Temp:  [98.2 °F (36.8 °C)-98.4 °F (36.9 °C)] 98.2 °F (36.8 °C)  Pulse:  [80-88] 80  Resp:  [16-18] 16  SpO2:  [96 %-97 %] 97 %  BP: (119)/(74-83) 119/83     Weight: 69.6 kg (153 lb 5.3 oz)  Body mass index is 22.64 kg/m².  Body surface area is 1.84 meters squared.      Intake/Output Summary (Last 24 hours) at 6/5/2020 0202  Last data filed at 6/4/2020 2357  Gross per 24 hour   Intake 240 ml   Output 1 ml   Net 239 ml       Physical Exam   Constitutional: He is oriented to person, place, and time. He appears well-developed and well-nourished. No distress.   HENT:   Head: Normocephalic and atraumatic.   Nose: Nose normal.   Eyes: Conjunctivae and EOM are normal. No scleral icterus.   Neck: Normal range of motion. Neck supple. No JVD present.   Cardiovascular: Normal rate, regular rhythm, normal heart sounds and intact distal pulses.   Pulmonary/Chest: Effort normal and breath sounds normal. No respiratory distress. He has no wheezes.   Abdominal: Soft. Bowel sounds are normal. He exhibits no distension. There is no tenderness. There is no rebound.   Musculoskeletal: Normal range of  motion. He exhibits no edema, tenderness or deformity.   Neurological: He is alert and oriented to person, place, and time.   Skin: Skin is warm and dry. No rash noted. He is not diaphoretic. No erythema. No pallor.   Psychiatric: He has a normal mood and affect. His behavior is normal. Judgment and thought content normal.   Vitals reviewed.      Significant Labs:   CBC:   Recent Labs   Lab 06/04/20  2354   WBC 9.96   HGB 10.4*   HCT 32.2*      , CMP:   Recent Labs   Lab 06/04/20  2354   *   K 4.0      CO2 27   *   BUN 7   CREATININE 0.7   CALCIUM 8.5*   PROT 6.7   ALBUMIN 3.0*   BILITOT 0.7   ALKPHOS 770*   AST 19   ALT 10   ANIONGAP 4*   EGFRNONAA >60.0    and All pertinent labs from the last 24 hours have been reviewed.    Diagnostic Results:  None

## 2020-06-05 NOTE — PROGRESS NOTES
" Admit Assessment    Patient Identification  Martin Alves   :  1965  Admit Date:  2020  Attending Provider:  Concetta Stinson MD              Referral:   Pt was admitted to [facility] with a diagnosis of Malignant neoplasm of prostate [C61], Bone metastases [C79.51].Neoplasm related pain, and was admitted this hospital stay due to Pain, unspecified [R52]       is involved was referred to the Social Work Department via routine referal.  Patient presents as a 54 y.o. year old  male.    Persons interviewed:  Patient    Living Situation:    Resides at [address] [place], [place] [id number], phone: [phone] (home).  Patient lives with his wife and 33 year old daughter.  He said they are both able to help and support him. Patient still works and has been at the same job for 30 years.     Current or Past Agencies and Description of Services/Supplies:  N/A    Patient informed of right to choose providers or agencies.  Patient provides permission to release any necessary information to [facility] and to [facility] agencies as needed to facilitate patient care, treatment planning, and patient discharge planning.  Written and verbal resources provided.    Coping: Patient said he tends "to just keep on going."  He said he has a positive attitude and seemed upbeat during this assessment.  He also said he relies on his jessy and prays everyday.  Patient said he has "almost too much support."  He said his phone rings all day with well-wishers and friends and family offering to do things for him.       Adjustment to Diagnosis and Treatment:  Patient's level of functioning is still relatively high.  Thus far he has adjusted well and seems to understand the benefits of a positive attitude.      Emotional/Behavioral/Cognitive Issues:  None noted at this time.     History/Current Symptoms of Anxiety/Depression: No  History/Current " Substance Use: No  Social History     Tobacco Use    Smoking status: Former Smoker     Packs/day: 0.50     Years: 40.00     Pack years: 20.00     Types: Cigarettes     Start date: 1978     Last attempt to quit: 2018     Years since quittin.5    Smokeless tobacco: Former User     Types: Chew     Quit date: 2017   Substance and Sexual Activity    Alcohol use: Yes     Alcohol/week: 1.0 standard drinks     Types: 1 Cans of beer per week     Comment: Occasional    Drug use: No    Sexual activity: Never       Indications of Abuse/Neglect: No  Abuse Screen (yes response referral indicated)  Feels Unsafe at Home or Work/School: No    Financial:  Payor/Plan Subscr  Sex Relation Sub. Ins. ID Effective Group Num   1. [facility]* DORIS CENTENO* 1965 Male  [id number]                                   [id number]      Other identified concerns/needs:  None noted at this time.    Plan: Discharge home    Interventions/Referrals: Patient was provided with 's name and telephone number and social work services were outlined for patient.  Patient engaged in treatment planning process.   providing psychosocial and supportive counseling, resources, education, assistance and discharge planning as appropriate.  Patient/caregiver state understanding of  available resources,  following, remains available.

## 2020-06-05 NOTE — PLAN OF CARE
Problem: Occupational Therapy Goal  Goal: Occupational Therapy Goal  Outcome: Met     Pt evaluated and requires no further acute OT services    Jacobo Jordan, OT   06/05/2020

## 2020-06-05 NOTE — NURSING
Arrived to room via wheelchair. AAO x's 4. Verbal obeys command. Respirations even, unlabored. Skin warm and dry. Denies nausea. Bed in lowest position, wheels locked, side rails up x's 2, call light in reach. Instructed to call for assistance as needed, verbalizes understanding. Will continue to monitor.

## 2020-06-06 NOTE — DISCHARGE SUMMARY
[facility]  Hematology/Oncology  Discharge Summary      Patient Name: Martin Alves  MRN: [id number]  Admission Date: 6/4/2020  Hospital Length of Stay: 2 days  Discharge Date and Time:  06/06/2020 11:11 AM  Attending Physician: No att. 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 He is a patients of Dr. Solano who is a direct admit following home communication. He reports upper right sided rib pain which radiates towards his back. He denies pain in his spinal area. He reports it being just below his right breast area as well. He reports right hip pain that he describes as a stabbing pain that is exacerbated with movement. He reports that his left hip pain is unchanged. Pain in his ribs as well as his hip increases with deep breathes. He reports that last week he was very active, able to cut grass, unload sand, but since yesterday, his pain has increased and level of activity has decreased. He states that his pain is severe at the end of the day and that he requires his pain medication in order to sleep. He reports more relief with Tramadol. Dr. Escobar had a long discussion with patient and his wife prior to admission. Wife is understanding of prognosis. He denies fevers, chills, N/V, diarrhea, constipation, abdominal pain.     * No surgery found *     Hospital Course: Mr. Alves was admitted to medical oncology for pain control. He was started on IV Dilaudid with good response. He was de-escalated to Oxycodone 10 q4h which was later reduced to Oxycodone 5mg q4hrs and provided adequate relief. A CT A/P was performed to evaluate for alternate causes of uncontrolled pain and it was significantly  unchanged except for a new small R-sided pleural thickening that would not likely cause the pain he was experiencing. Hx and physical findings favor a muscular sprain as the cause of the pain. Following two days of providing an IV and later a PO pain medication regimen, he was stable for discharge home. He was prescribed oxycodone 5mg q4hrs as it adequately controlled his pain while inpatient.       Subjective:  Review of Systems   Constitutional: Negative for chills and fever.   HENT: Negative for congestion and sore throat.    Eyes: Negative for photophobia and visual disturbance.   Respiratory: Negative for cough, chest tightness and shortness of breath.    Cardiovascular: Negative for chest pain and leg swelling.   Gastrointestinal: Negative for abdominal distention, abdominal pain, constipation, diarrhea, nausea and vomiting.   Endocrine: Negative for polyphagia and polyuria.   Genitourinary: Negative for dysuria, frequency and hematuria.   Musculoskeletal: Negative for arthralgias and joint swelling.   Skin: Negative for color change and rash.   Neurological: Negative for light-headedness and headaches.      Objective:      Vital Signs (Most Recent):  Temp: 98.4 °F (36.9 °C) (06/06/20 0708)  Pulse: 84 (06/06/20 0708)  Resp: 16 (06/06/20 0708)  BP: 122/81 (06/06/20 0708)  SpO2: 98 % (06/06/20 0708) Vital Signs (24h Range):  Temp:  [98.4 °F (36.9 °C)-100.1 °F (37.8 °C)] 98.4 °F (36.9 °C)  Pulse:  [81-89] 84  Resp:  [14-17] 16  SpO2:  [93 %-99 %] 98 %  BP: (106-129)/(60-81) 122/81      Weight: 69.6 kg (153 lb 5.3 oz)  Body mass index is 22.64 kg/m².  Body surface area is 1.84 meters squared.        Intake/Output Summary (Last 24 hours) at 6/6/2020 1106  Last data filed at 6/6/2020 0837      Gross per 24 hour   Intake 1000 ml   Output 3100 ml   Net -2100 ml         Physical Exam   Constitutional: He is oriented to person, place, and time. He appears well-developed and well-nourished. No distress.   HENT:   Head:  Normocephalic and atraumatic.   Mouth/Throat: Oropharynx is clear and moist.   Eyes: Pupils are equal, round, and reactive to light. Conjunctivae and EOM are normal.   Cardiovascular: Normal rate and regular rhythm.   Pulmonary/Chest: Effort normal and breath sounds normal.   Abdominal: Soft. Bowel sounds are normal.   Musculoskeletal: Normal range of motion. He exhibits no edema.   Neurological: He is alert and oriented to person, place, and time.   Skin: Skin is warm. He is not diaphoretic. No erythema.   Nursing note and vitals reviewed.      Significant Diagnostic Studies: Labs:   CMP   Recent Labs   Lab 06/04/20 2354 06/05/20  0421 06/06/20  0403   * 138 139   K 4.0 4.0 3.5    105 106   CO2 27 23 23   * 129* 102   BUN 7 8 8   CREATININE 0.7 0.7 0.6   CALCIUM 8.5* 8.6* 8.3*   PROT 6.7 6.5 6.4   ALBUMIN 3.0* 2.8* 2.7*   BILITOT 0.7 0.7 0.6   ALKPHOS 770* 759* 682*   AST 19 18 14   ALT 10 7* 8*   ANIONGAP 4* 10 10   ESTGFRAFRICA >60.0 >60.0 >60.0   EGFRNONAA >60.0 >60.0 >60.0    and CBC   Recent Labs   Lab 06/04/20 2354 06/05/20 0421 06/06/20  0403   WBC 9.96 8.44 9.36   HGB 10.4* 10.6* 10.1*   HCT 32.2* 33.3* 31.8*    283 278       Pending Diagnostic Studies:     None        Final Active Diagnoses:    Diagnosis Date Noted POA    PRINCIPAL PROBLEM:  Neoplasm related pain [G89.3] 12/09/2019 Yes    Restless leg syndrome, uncontrolled [G25.81] 06/05/2020 Yes    Hypophosphatemia [E83.39] 06/05/2020 Yes    Bone metastases [C79.51] 12/09/2019 Yes    Anemia, unspecified [D64.9] 06/07/2019 Yes    Prostate cancer metastatic to intrapelvic lymph node [C61, C77.5] 11/21/2018 Yes      Problems Resolved During this Admission:      Discharged Condition: fair    Disposition: Home or Self Care    Follow Up:    Patient Instructions:   No discharge procedures on file.  Medications:  Reconciled Home Medications:      Medication List      START taking these medications    oxyCODONE 5 MG immediate  release tablet  Commonly known as:  ROXICODONE  Take 1 tablet (5 mg total) by mouth every 4 (four) hours as needed.        STOP taking these medications    benzonatate 100 MG capsule  Commonly known as:  TESSALON PERLES     dronabinoL 5 MG capsule  Commonly known as:  MARINOL     fluticasone propionate 50 mcg/actuation nasal spray  Commonly known as:  FLONASE     methocarbamoL 500 MG Tab  Commonly known as:  ROBAXIN     metroNIDAZOLE 250 MG tablet  Commonly known as:  FLAGYL     predniSONE 5 MG tablet  Commonly known as:  DELTASONE     tamsulosin 0.4 mg Cap  Commonly known as:  FLOMAX     ZYTIGA 500 mg Tab  Generic drug:  abiraterone        ASK your doctor about these medications    calcium-vitamin D3 500 mg(1,250mg) -200 unit per tablet  Commonly known as:  OYSTER SHELL CALCIUM-VIT D3  Take 1 tablet by mouth 2 (two) times daily.     enzalutamide 40 mg Cap  Commonly known as:  XTANDI  Take 160 mg by mouth once daily.     HYDROcodone-acetaminophen 5-325 mg per tablet  Commonly known as:  NORCO  Take 1 tablet by mouth every 4 (four) hours as needed for Pain.     leuprolide 1 mg/0.2 mL injection  Commonly known as:  LUPRON  Inject into the skin every 3 (three) months. UNKNOWN DOSAGE     megestroL 40 MG Tab  Commonly known as:  MEGACE  Take 1 tablet (40 mg total) by mouth 2 (two) times daily.     pramipexole 0.125 MG tablet  Commonly known as:  MIRAPEX  Take 1 tablet (0.125 mg total) by mouth nightly.     traMADoL 50 mg tablet  Commonly known as:  ULTRAM  Take 1 tablet (50 mg total) by mouth every 4 (four) hours as needed for Pain.     valACYclovir 500 MG tablet  Commonly known as:  VALTREX  Take 1 tablet (500 mg total) by mouth 2 (two) times daily.            Misty Winston MD  Hematology/Oncology  [facility]

## 2020-06-06 NOTE — ASSESSMENT & PLAN NOTE
Prostate cancer metastatic to intrapelvic lymph node  Bone metastases    Patient w/ know metastatic lesions throughout pelvis and hips presenting w/ progressively worsening and uncontrolled pain.  Due to concerns of worsening metastatic disease performed CT A/P. It did not reveal significant changes except for small R sided pleural thickening, which was not thought to be causing his discomfort.     He was started on IV dilaudid for pain control and eventually weaned to PO oxycodone 5mg q4hrs, a regimen with which he achieved adequate pain control.     Plan:  -Stable for discharge home on 06/06 with a prescription for oxy 5mg q4hrs for pain control.

## 2020-06-06 NOTE — PLAN OF CARE
No acute events overnight. Temp 100.1 noted, but not sustained. Pain medication oxy IR given x 1 for pain of 5/10. Plan for possible d/c in am.

## 2020-06-06 NOTE — ASSESSMENT & PLAN NOTE
Home Medication: Pramipexole 0.125 mg nightly. Patient reports persistent symptoms.     Plan:  - will increase dose to 0.250 mg nightly and continue to titrate medication up weekly until symptoms are controlled   - melatonin nightly PRN for sleep aide

## 2020-06-06 NOTE — PROGRESS NOTES
Discharge instructions given and explained to pt.  Pt. verbalized understanding with no further questions.  Peripheral IV D/C'd with catheter tip intact.  VS WDL.  Patient is leaving with wife.      ROAD TEST  O=SpO2 99% on RA  A=Ambulating around room and hallway  D=IV D/C'd  T=Tolerating regular diet  E=Voids  S=Performs self care independently  T=Teaching on wounds care complete NA

## 2020-06-06 NOTE — SUBJECTIVE & OBJECTIVE
Interval History: No acute events overnight. Has remained afebrile and HD stable.     Oncology Treatment Plan:   [No treatment plan]    Medications:  Continuous Infusions:  Scheduled Meds:   calcium-vitamin D3  1 tablet Oral BID    enoxaparin  40 mg Subcutaneous Daily    gabapentin  100 mg Oral TID    megestroL  40 mg Oral BID    polyethylene glycol  17 g Oral Daily    pramipexole  0.25 mg Oral Nightly     PRN Meds:acetaminophen, dextrose 10 % in water (D10W), dextrose 10 % in water (D10W), glucagon (human recombinant), glucose, glucose, melatonin, ondansetron, oxyCODONE, prochlorperazine, sodium chloride 0.9%     Review of Systems   Constitutional: Negative for chills and fever.   HENT: Negative for congestion and sore throat.    Eyes: Negative for photophobia and visual disturbance.   Respiratory: Negative for cough, chest tightness and shortness of breath.    Cardiovascular: Negative for chest pain and leg swelling.   Gastrointestinal: Negative for abdominal distention, abdominal pain, constipation, diarrhea, nausea and vomiting.   Endocrine: Negative for polyphagia and polyuria.   Genitourinary: Negative for dysuria, frequency and hematuria.   Musculoskeletal: Negative for arthralgias and joint swelling.   Skin: Negative for color change and rash.   Neurological: Negative for light-headedness and headaches.     Objective:     Vital Signs (Most Recent):  Temp: 98.4 °F (36.9 °C) (06/06/20 0708)  Pulse: 84 (06/06/20 0708)  Resp: 16 (06/06/20 0708)  BP: 122/81 (06/06/20 0708)  SpO2: 98 % (06/06/20 0708) Vital Signs (24h Range):  Temp:  [98.4 °F (36.9 °C)-100.1 °F (37.8 °C)] 98.4 °F (36.9 °C)  Pulse:  [81-89] 84  Resp:  [14-17] 16  SpO2:  [93 %-99 %] 98 %  BP: (106-129)/(60-81) 122/81     Weight: 69.6 kg (153 lb 5.3 oz)  Body mass index is 22.64 kg/m².  Body surface area is 1.84 meters squared.      Intake/Output Summary (Last 24 hours) at 6/6/2020 1106  Last data filed at 6/6/2020 0837  Gross per 24 hour    Intake 1000 ml   Output 3100 ml   Net -2100 ml       Physical Exam   Constitutional: He is oriented to person, place, and time. He appears well-developed and well-nourished. No distress.   HENT:   Head: Normocephalic and atraumatic.   Mouth/Throat: Oropharynx is clear and moist.   Eyes: Pupils are equal, round, and reactive to light. Conjunctivae and EOM are normal.   Cardiovascular: Normal rate and regular rhythm.   Pulmonary/Chest: Effort normal and breath sounds normal.   Abdominal: Soft. Bowel sounds are normal.   Musculoskeletal: Normal range of motion. He exhibits no edema.   Neurological: He is alert and oriented to person, place, and time.   Skin: Skin is warm. He is not diaphoretic. No erythema.   Nursing note and vitals reviewed.      Significant Labs:   CBC:   Recent Labs   Lab 06/04/20 2354 06/05/20 0421 06/06/20  0403   WBC 9.96 8.44 9.36   HGB 10.4* 10.6* 10.1*   HCT 32.2* 33.3* 31.8*    283 278    and CMP:   Recent Labs   Lab 06/04/20 2354 06/05/20  0421 06/06/20  0403   * 138 139   K 4.0 4.0 3.5    105 106   CO2 27 23 23   * 129* 102   BUN 7 8 8   CREATININE 0.7 0.7 0.6   CALCIUM 8.5* 8.6* 8.3*   PROT 6.7 6.5 6.4   ALBUMIN 3.0* 2.8* 2.7*   BILITOT 0.7 0.7 0.6   ALKPHOS 770* 759* 682*   AST 19 18 14   ALT 10 7* 8*   ANIONGAP 4* 10 10   EGFRNONAA >60.0 >60.0 >60.0       Diagnostic Results:  I have reviewed and interpreted all pertinent imaging results/findings within the past 24 hours.

## 2020-06-22 NOTE — PROGRESS NOTES
"Subjective:       Patient ID: Martin Alves is a 54 y.o. male.    Chief Complaint: No chief complaint on file.    HPI     Reports still declining  Reports continued weight loss  Appetite comes and goes  Wife has been encouraging him to eat  Using Ensure supplements 1 x per AM (tends to do for breakfast)  Lunch does pretty well and states supper is a "toss up"  States he feels hungry but has an anorexia when he tries to eat  No nausea  No dysphagia    Pain reported in his back, shoulders off and on,   When pain increases it can be 10/10  Relief with pain pill and heating pad  Hard to get comfortable when moving  Harder to sit on couch now  Uses Oxycodone q 4 hours and Tramadol    6/2020 CT scans:  COMPARISON:  MRI abdomen pelvis 03/10/2020, CT abdomen pelvis 02/20/2020, CT chest abdomen pelvis 11/11/2019     FINDINGS:  Thoracic soft tissues: No significant abnormality.     Aorta: Left-sided aortic arch which demonstrates 2 branch vessel pattern noting common origin of the brachiocephalic and left common carotid arteries.  Aorta maintains normal caliber, contour, and course with minimal calcific atherosclerosis.     Heart: Along the upper limit of normal in size with trace pericardial effusion.     Hayde/Mediastinum: No significant lymphadenopathy     Lungs: New focus pleural thickening adjacent to the anterolateral aspect of the right 3rd rib possibly signifying progression of metastatic disease.  Dependent atelectasis.  Stable pulmonary nodule in the left upper lobe (axial series 2, image 45).  No new lesions.     Liver: Normal in size with redemonstration of numerous scattered hypodensities with those large enough to characterize demonstrating characteristics compatible with simple cysts.  No new lesions.     Gallbladder: No calcified gallstones.     Bile Ducts: No evidence of dilated ducts.     Pancreas: Pancreatic head and uncinate process present.  Pancreatic body and tail not visualized on today's exam " possibly representing congenital anomaly or prior distal pancreatectomy.     Spleen: Stable appearance of the spleen with scattered adjacent splenules.     Adrenals: Stable hypoattenuating right adrenal nodule previously characterized as an adenoma.  Prominence of the left adrenal gland without definitive nodularity.     Kidneys/ Ureters: Normal in size and location without evidence of suspicious renal mass or hydroureteronephrosis.     Bladder: No evidence of abnormal wall thickening to suggest active inflammation.  Left anterior bladder ear adjacent to left inguinal hernia.     Reproductive organs: Unremarkable.     GI Tract/Mesentery: Majority of the colon present on the right with majority of the small bowel present on the left, a finding consistent with congenital non rotation.  No evidence of high-grade bowel obstruction or active inflammatory process.     Peritoneal Space: No ascites. No free air.     Retroperitoneum:  Redemonstration of abnormal periaortic and iliac chain lymphadenopathy.  Largest periaortic lymph node measures 1.8 cm in short axis diameter (axial series 2, image 126), previously 1.8 cm.  Largest right iliac chain lymph node measures 1.9 cm (axial series 2, image 170), previously 2.0 cm.  Previously mentioned periaortic and right iliac chain lymph nodes measures similar size (axial series 2, image 142 and 150 respectively).     Abdominal wall:  Bilateral fat containing inguinal hernias.     Vasculature: Abdominal aorta maintains normal caliber, contour, and course with minimal calcific atherosclerosis.     Bones: Redemonstration of numerous scattered blastic osseous lesions with the most prominent involving the left aspect of the T7 vertebral body, lateral aspect of the left 7th rib, and posterior left aspect of the L3 vertebral body.  Overall, osseous lesions are much better appreciated on prior MRI.  Stable superimposed degenerative changes without evidence of acute  "fracture.     Impression:     In this patient with metastatic prostate cancer, there is redemonstration of metastatic involvement of the periaortic and iliac chain lymph nodes as well as scattered osseous metastases; overall stable disease from most recent prior CT.  Interim development new pleural thickening adjacent to the anterolateral aspect of the right 3rd rib.     Stable right adrenal adenoma.     Majority of the colon present on the right with the majority of small bowel present on the left, finding consistent with congenital nonrotation.     Additional, stable findings as above.    Reports still losing weight  Received Lupron at Urology recently  153 lbs last week and now 155 lbs  States eating pretty well, using Megace (Marinol did not work)  Prior weight at [facility] was 161 lbs  He has been speaking with Renetta Hallman, the dietician, and has a virtual set up to further discuss     States pain is "not bad at all"  Hip improved post XRT  States restless leg continues- medication attempted not working  Uses pain pill at night to assist with sleeping at night  He is hoping not to have to keep doing this - prefers Tramadol   Plans to try Melatonin to see if it helps  Now can ambulate without cane  No real pain, notes really more stiffness  Wife noting posture issue     Reports nausea resolved  Normal stools     Working from home  His boss is going to keep him at home     MRI [id number]            COMPARISON:  CT abdomen pelvis 02/20/2020, 01/09/2020, 11/11/2019, 08/07/2019.    FINDINGS:  Pulmonary Bases: demonstrate no gross abnormality.    Liver: Normal in size with multiple subcentimeter hepatic cyst, too small to characterize.  Additional 1.8 cm left hepatic lobe cyst.    Bile Ducts: No intra or extrahepatic biliary ductal dilatation.    Gallbladder: normal    Pancreas: The pancreas is located within the right hemiabdomen and appears stable when compared to prior imaging.  No peripancreatic inflammatory " stranding.    Spleen: Lobular contour with few small splenules noted.    Proximal Gastrointestinal tract: normal.    Mesentery: normal    Adrenal Glands: Stable 2.0 cm right adrenal nodule which demonstrates loss of signal intensity on out of phase images compared with in phase images consistent with adrenal adenoma.  Stable thickening of the left adrenal gland.    Kidneys: Stable in size and location.  No nephrolithiasis or hydroureteronephrosis.  The urinary bladder is unremarkable.    Aorta and Abdominal Vasculature: normal.    Lymph nodes: Multiple enhancing periaortic and bilateral iliac chain lymph nodes with associated restricted diffusion, similar to prior CT examination allowing for differences in modality, including index left periaortic node measuring approximately 0.9 mm (axial series 27, image 2) and right iliac chain lymph node measuring approximately 1.2 cm (axial series 21, image 102).    Body wall: normal    Other: Multiple enhancing lesions throughout the visualized thoracic and lumbar spine, sacrum, bilateral iliac bones, right femoral head/femoral neck.  There is abnormal edema within the left femoral head and left femoral neck, nonspecific.  Bone lesion of the right ischium.  There is post-contrast enhancement at the bilateral hip joints left more than right, consistent with nonspecific synovitis.  There is a small left hip joint effusion       Impression         1. Stable lymphadenopathy as above in this patient with history of metastatic prostate cancer.  2. Multiple enhancing lesions throughout the visualized spine, sacrum, iliac bones, right proximal femur better characterized on concomitant MR lumbar spine consistent with osseous metastatic disease.  3. Stable right adrenal adenoma.  4. Post-contrast synovial enhancement at the bilateral hip joints left more than right consistent with nonspecific inflammatory change.              COMPARISON:  CT abdomen pelvis 02/20/2020,  08/07/2019.    FINDINGS:  Alignment: Grade 1 retrolisthesis of L1 on L2, L2 on L3, and L5 on S1.    Vertebrae: There are multiple lesions throughout the lumbar spine which demonstrate decreased signal intensity on T1 weighted images with postcontrast heterogeneous enhancement.  There are wedge deformities involving the T12, L1 and L2 vertebral bodies, similar when compared to CT examination dated 08/07/2019.  No acute fracture identified.  No extension of the disease toward the spinal canal or its contents.  Discs: Mild disc space narrowing at all levels.  Cord: Normal.  Conus terminates at L1.  Limited evaluation of the abdomen demonstrates multiple periaortic and iliac chain region  enlarged lymph nodes better characterized on prior CT examination dated 02/20/2020.  Stable nonspecific right adrenal nodule.  Degenerative findings:  T12-L1: No significant spinal canal stenosis or neural foraminal narrowing.  L1-L2: Circumferential disc bulge and bilateral facet hypertrophy without significant spinal canal stenosis or neural foraminal narrowing.  L2-L3: Circumferential disc bulge and bilateral facet hypertrophy without significant spinal canal stenosis or neural foraminal narrowing.  L3-L4: Circumferential disc bulge and bilateral facet hypertrophy with mild left-sided neural foraminal narrowing.  No significant spinal canal stenosis.  L4-L5: Circumferential disc bulge and bilateral facet hypertrophy with moderate left and mild right-sided neural foraminal narrowing.  L5-S1: There is a posterior right paracentral disc extrusion extruding cranially without significant spinal canal stenosis.  Bilateral facet hypertrophy contributes to mild bilateral neural foraminal narrowing.  Incidentally noted signal abnormality involving the posterior elements of C2, C4, C5 and the T1 vertebral body with extension into the posterior elements.       Impression         1. Multiple enhancing lesions throughout the lumbar spine  "consistent with osseous metastatic disease.  No extension into the spinal canal.  2. Chronic wedge deformities of the T12, L1, and L2 vertebral bodies.  No acute fracture.  3. Multilevel neural foraminal narrowing throughout the lumbar spine.  Individual disc levels detailed above.  4. Incidentally noted signal abnormality involving the posterior elements of the C2, C4, and C5 vertebral bodies.  Additional signal abnormality within the T1 vertebral body with extension into the posterior elements.  5. Right paracentral disc protrusion L5-S1 causing no central canal stenosis.         Chief complaint:        Chief Complaint   Patient presents with    Prostate cancer metastatic to intrapelvic lymph node      Xgeva received 1/21/2020.   Post 1 cycle Xofigo     Oncologic History:  - 2015 having difficulty urinating- urgency and frequency  Saw Dr. Monson  PSA= 4.6 ng/ml and placed on Flomax  Advised to recheck in 1 month and it was 3.9 ng/ml  - early 2016 PSA- 2/16 = 7.8 ng/ml  - 2/16 prostate biopsy- told very aggressive Erika=9  Placed on lupron q 3 months at that time (last 10/18, due again 1/19)  MRI prostate  CT scans- seen in LNs and base of bladder  Bone scan- negative  - told inoperable  - referred to Dr. Kirkland, medical oncology  - port placed 3/21/16  Initiated Taxotere q 3 weeks x 6 (started on 3/25/16)  - PSA dropped significantly   - plan was to rescan and check PSA after 3 cycles- delays with insurance so done instead after 2 chemotherapy treatments in 5/19/2016  Told "cancer 98% dead" Lns back to normal size  - 5/26/18 PSA= 0.01 ng/ml  - 5/27/18 3rd cycle of chemo began  - tolerated chemotherapy well - did receive neupogen x 3 days post  - completed 6 cycles 7/29/16  - was doing scans q 6 months and blood work every 3 months  - Dr. Hoskins left [facility] May 2017- notified by letter  - Dr. Costello-   - 8/17 scans and labs stable  - Dr. Sethi was assigned most recently as medical oncologist  - July 2018 PSA= " 1.68 ng/ml  - 10/1518 CT scans and bone scan  - 10/22/18 saw Dr. Sethi  PSA 1.58 ng/ml  LNs increased slightly on scans  Bone scan negative  Advised him to start Zytiga, started 11/6/18 (with Prednisone)  - concerned about medication and side effects  Feels like he did well on chemotherapy  Concerned about MD turnover  Sent to a chemo class and questions why for this regimen and then got a bill for a co-pay  - came here for a second opinion     -started Zytiga and Prednisone.   -progression noted recently and taxotere re-initiated.   -progression noted, plan to start Xofigo        HM:  Colonoscopy neg 1 yr ago        HPI Patient doing a telemedicine visit. He notes that he has been feeling bad. He notes that he is feeling poor. He notes for the last 2-3 weeks he has been in such pain that he has needed pain medication to go to sleep. His thighs are restless and he has trouble sleeping. States that his hips are doing well. Both him and his wife states that he is having a rough time.       Right hip pain started at yesterday and and has increased in the amount of pain, states it felt like someone was stabbing a knife in the hip. He is moving slightly better, pain is worse with movement. Uncomfortable at all times, but when moving he does have a lot of pain. He has taken his pain medication it will decrease pain from a 10 out of 10 to a 5 out of 10. - He has had issues with left hip and had been favoring the right hip due to pain in the left hip. He did have radiation to his left hip for the pain. He is a prostate cancer patient, who is known to have bone mets throughout the iliac bones.      This is a 54 y.o. 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 He is currently getting treated with Xofigo. He is referred for consideration of palliative RT.      I recommend treating the Left hip region 8 Gy x1. This has been demonstrated in randomized trials to provide equivalent  pain relief to more extended palliative regimens for bone metastases. Potential short- and long-term side effects of radiation therapy were reviewed. At the end of our discussion, he was in agreement with proceeding with the recommended treatment. .     Review of Systems   Constitutional: Positive for activity change, appetite change, fatigue and unexpected weight change. Negative for chills and fever.   HENT: Negative for postnasal drip.    Respiratory: Negative for cough, shortness of breath and wheezing.    Cardiovascular: Negative for chest pain, palpitations and leg swelling.   Gastrointestinal: Negative for abdominal distention, abdominal pain, blood in stool, constipation, diarrhea, nausea and vomiting.   Genitourinary: Negative for decreased urine volume, dysuria and frequency.   Musculoskeletal: Negative for arthralgias, gait problem, myalgias and neck pain.   Neurological: Negative for weakness, numbness and headaches.   Psychiatric/Behavioral: Negative for dysphoric mood. The patient is not nervous/anxious.          Objective:      Physical Exam  Vitals signs and nursing note reviewed.   Constitutional:       General: He is not in acute distress.     Appearance: Normal appearance. He is ill-appearing.      Comments: Presents with his wife  ECOG= 1  thinner   HENT:      Head: Normocephalic and atraumatic.   Eyes:      General: No scleral icterus.     Extraocular Movements: Extraocular movements intact.      Conjunctiva/sclera: Conjunctivae normal.      Pupils: Pupils are equal, round, and reactive to light.   Cardiovascular:      Rate and Rhythm: Normal rate and regular rhythm.      Heart sounds: No murmur.   Neurological:      Mental Status: He is alert.      Labs- reviewed  Recent imaging- reviewed   Assessment:       1. Neoplasm related pain    2. Prostate cancer metastatic to intrapelvic lymph node    3. Bone metastases        Plan:     1. Discussed long acting narcotic needed  MS Contin reviewed and  sent  He will take short acting as needed and then update us tomorrow  2-3. Discussed performance status at length and reviewed diagnosis  His wife is understandably hoping for a miracle  We reviewed that miracles can occur and we will support as best we can  However, he is just now in a position to re discuss chemo as far enough from Xofigo and marrow suppression  Performance status is a concern which we reviewed  He is aware of prognosis overall and she understands as well  We discussed social work support, need to get long term disability

## 2020-06-24 NOTE — PLAN OF CARE
DISCONTINUE ON PATHWAY REGIMEN - Prostate    No Medical Intervention - Off Treatment.    REASON: Disease Progression  PRIOR TREATMENT: [id number]: Referral For [place] [facility]    START ON PATHWAY REGIMEN - Prostate    [id number]        Cabazitaxel (Jevtana)       Prednisone           Additional Orders: May consider administering prednisone as 5 mg PO BID.    **Always confirm dose/schedule in your pharmacy ordering system**    Patient Characteristics:  Adenocarcinoma, Distant Metastases, Castration Resistant, Symptomatic, Prior   Docetaxel/Docetaxel Ineligible  Histology: Adenocarcinoma  Therapeutic Status: Distant Metastases    Intent of Therapy:  Non-Curative / Palliative Intent, Not Discussed with Patient

## 2020-06-24 NOTE — PROGRESS NOTES
In response to in basket msg from pt's provider, Dr. Escobar asking SW to help pt apply for long-term disability SW called pt.  He explained that his employer will work with him and allow him to work from home as long as he can.  He said he is not ready to apply for disability yet.  SW confirmed that pt has SW name and phone number and encouraged him to reach out with future needs including applying for disability.  He expressed gratitude and agreed.

## 2020-06-24 NOTE — PROGRESS NOTES
In response to a follow up conversation with pt's provider, Dr. Escobar, [facility] called pt's wife regarding her concerns about mounting medical and other costs.  Pt's wife said she is anticipating financial difficulties when pt has to get on disability but she said they are okay for now while pt is still working.  [facility] explained to Mrs. Alves that [facility] has a service that helps pts apply for disability and also explained that there is a patient assistance fund to help with cancer related expenses.  She expressed gratitude and understanding.

## 2020-07-01 NOTE — TELEPHONE ENCOUNTER
Called patient to schedule chemo he would like to get it done on Friday's we scheduled for 7/10.        ----- Message from Izabella Mares sent at 6/30/2020  7:10 AM CDT -----  Regarding: FW: APPROVED  Dr. Escobar would like him to start and not delay  ----- Message -----  From: Herlinda Escobar MD  Sent: 6/29/2020   4:01 PM CDT  To: Izabella Mares  Subject: RE: APPROVED                                     Yes  ----- Message -----  From: Izabella Mares  Sent: 6/29/2020   7:17 AM CDT  To: Herlinda Escobar MD  Subject: FW: APPROVED                                     He can start his Jevtana this week?   ----- Message -----  From: Aracely Landeros  Sent: 6/26/2020  10:14 AM CDT  To: [facility]  Pool, Shawn BORJA Staff  Subject: APPROVED                                         Called patient to schedule infusion for Jevtana he thought Dr Escobar would wait longer to start the infusions. He thought he need to gain more weight and get stronger.  Please call patient. And let us know when to schedule chemo.      ----- Message -----  From: Lisa Covarrubias  Sent: 6/24/2020  11:21 AM CDT  To: Lisa Covarrubias [facility]  Pool  Subject: 6/24 pending Jevtana (message sent to auth)          Start Jevtana next week   RTC 3 weeks later with EP labs and chemo

## 2020-07-09 NOTE — PROGRESS NOTES
"Oncology Nutrition Assessment for Medical Nutrition Therapy  Initial Visit    Martin Alves   1965    Referring Provider:  Rosario Grant NP      Reason for Visit: Pt in for education and nutrition counseling     PMHx:   Past Medical History:   Diagnosis Date    Prostate cancer     Chemo in 2016       Nutrition Assessment    This is a 54 y.o.male with a medical diagnosis of metastatic prostate cancer. Referred to nutrition for appetite loss and weight loss.   Patient reports his appetite is "hit and miss." Takes Megace BID about an hour before breakfast and dinner. He has trouble with meat or anything that requires a lot of chewing. He reports significant constipation. Not currently following a bowel regimen. He has gained 2lb from previous visit but he and his wife do not feel he is "ready" for more chemotherapy. He presented to visit somewhat agitated and with concerns of drooping R eyelid which he thought started when he started morphine. Asked KARIME Ponce to evaluate patient (see separate note).     Diet recall:   Breakfast- Ensure High Protein (sometimes also has an egg or waffles with it)   Lunch- Leftover spaghetti, chocolate chip ice cream  Snacks-Snowballs, pistachios   Dinner- Noodles and butter     Drinks water and Gatorade      Weight:68.4 kg (150 lb 12.7 oz)  Height:5' 9" (1.753 m)  BMI:Body mass index is 22.27 kg/m².   IBW: Ideal body weight: 70.7 kg (155 lb 13.8 oz)    Usual BW: 190-195lb  Weight Change: 40-50lb wt loss within 6-7 months     Nutrition focused physical findings: thin, significant muscle losses     Allergies: Patient has no known allergies.    Current Medications:    Current Outpatient Medications:     calcium-vitamin D3 (OYSTER SHELL CALCIUM-VIT D3) 500 mg(1,250mg) -200 unit per tablet, Take 1 tablet by mouth 2 (two) times daily., Disp: 180 tablet, Rfl: 3    enzalutamide (XTANDI) 40 mg Cap, Take 160 mg by mouth once daily., Disp: 120 capsule, Rfl: 3    " HYDROcodone-acetaminophen (NORCO) 5-325 mg per tablet, Take 1 tablet by mouth every 4 (four) hours as needed for Pain., Disp: 120 tablet, Rfl: 0    leuprolide (LUPRON) 1 mg/0.2 mL injection, Inject into the skin every 3 (three) months. UNKNOWN DOSAGE, Disp: , Rfl:     megestroL (MEGACE) 40 MG Tab, Take 1 tablet (40 mg total) by mouth 2 (two) times daily., Disp: 60 tablet, Rfl: 3    morphine (MS CONTIN) 15 MG 12 hr tablet, Take 1 tablet (15 mg total) by mouth every 8 (eight) hours as needed for Pain., Disp: 60 tablet, Rfl: 0    oxyCODONE (ROXICODONE) 5 MG immediate release tablet, Take 1 tablet (5 mg total) by mouth every 4 (four) hours as needed., Disp: 180 tablet, Rfl: 0    pramipexole (MIRAPEX) 0.125 MG tablet, Take 1 tablet (0.125 mg total) by mouth nightly., Disp: 30 tablet, Rfl: 2    traMADoL (ULTRAM) 50 mg tablet, Take 1 tablet (50 mg total) by mouth every 4 (four) hours as needed for Pain., Disp: 60 tablet, Rfl: 0    valACYclovir (VALTREX) 500 MG tablet, Take 1 tablet (500 mg total) by mouth 2 (two) times daily., Disp: 30 tablet, Rfl: 1    Food/medication interactions noted: none    Vitamins/Supplements: Vitamin D and calcium, Centrum multivitamin and turmeric     Labs: Reviewed - Albumin 2.8    Nutrition Diagnosis    Problem: malnutrition  Etiology (related to): appetite loss  and inadequate protein-calorie intake   Signs/Symptoms (as evidenced by): weight loss of >20% usual body weight in 6-7 months with significant muscle wasting     Nutrition Intervention    Nutrition Prescription   2040 Kcals (30kcal/kg)  82 g protein (1.2g/kg)   2040 mL fluid (30mL/kg)    Recommendations:  Change Ensure High Protein to Ensure Enlive BID (AM and one hour before bed)  Focus on high protein foods that are easy to chew- beans, peanut butter, eggs, etc.    Senokot-S 2 tablets at nighttime for constipation; increase to two tablets BID if needed   Continue to increase fluid intake     Nutrition Monitoring and Evaluation     Monitor: energy intake, diet tolerance  and weight    Goals: weight gain    Motivation to change/anticipated barriers: no barriers identified; all directions provided in written form    Follow up In 1-2 weeks     Communication to referring provider/care team: discussed with provider; note available in chart     Counseling time: 15 Minutes    Renetta Hallman, MPH, RD, , LDN, FAND   [phone]

## 2020-07-09 NOTE — PROGRESS NOTES
Patient seen by Renetta Hallman, dietician today.  I was asked to see him, as Renetta had some concerns.  Patient's right eye lid was drooping. He states this started about 7-10 days ago. He relates this to starting on morphine. His wife also states she noted it, but it is worse today. He denies headaches, dizziness and lightheadedness. He does note hs occasionally sees double vision. We recommended he go to the ER. Patient denies wanting to go to the ER and refuses to go. We have ordered an MRI of the brain outpatient and will follow up with the results. Dr. Escobar aware of the situation, has spoken to patient and encouraged the ER, but family and patient still refuse.

## 2020-07-10 NOTE — PROGRESS NOTES
07/10/2020    Radiation Oncology Follow-Up Visit    Prior Radiation History:  Treatment Summary  Course: Course1    Treatment Site Energy Dose/Fx (Gy) #Fx Total Dose (Gy) Start Date End Date Elapsed Days   LT HIP 18X 8  8 3/23/2020 3/23/2020 0       Treatment Summary  Course: Course 2 Pelvis     Treatment Site Ref. ID Energy Dose/Fx (Gy) #Fx Dose Correction (Gy) Total Dose (Gy) Start Date End Date Elapsed Days   3D RT HIP [id number] 18X 8  0 8 2020 0     HPI: Mr. Alves is a 54 year old man with metastatic resistant prostate cancer on Xofigo, who underwent palliative L hip XRT (8Gy in 1 fraction) with Dr. Young in 3/20. He subsequently developed R hip pain and underwent 8Gy in 1 fraction to his various bone mets in the right hip region on 20, overall tolerating therapy well.    Most recently, he noted new R eyelid drooping and vision changes at nutrition visit on 20 and was underwent MRI brain imaging, which demonstrated abnormal enhancement and fullness in the R Meckel's cave along with clival involvement. Dr. Escobar started the patient on Decadron and referred to our department for additional treatment discussion. His case was also discussed with Dr. Pimentel of neurosurgery, who deferred additional surgical intervention and agreed with referral to our department.    Today, he notes R diplopia and R ptosis, which onset 2-3 days ago. He denies fevers, chills, headaches, nausea, vomiting, diarrhea, or weakness.  ,  Past Medical History:   Diagnosis Date    Prostate cancer     Chemo in 2016       Past Surgical History:   Procedure Laterality Date    PORTACATH PLACEMENT         Social History     Tobacco Use    Smoking status: Former Smoker     Packs/day: 0.50     Years: 40.00     Pack years: 20.00     Types: Cigarettes     Start date: 1978     Quit date: 2018     Years since quittin.6    Smokeless tobacco: Former User     Types: Chew     Quit date: 2017    Substance Use Topics    Alcohol use: Yes     Alcohol/week: 1.0 standard drinks     Types: 1 Cans of beer per week     Comment: Occasional    Drug use: No       Cancer-related family history is not on file.    Current Outpatient Medications on File Prior to Visit   Medication Sig Dispense Refill    calcium-vitamin D3 (OYSTER SHELL CALCIUM-VIT D3) 500 mg(1,250mg) -200 unit per tablet Take 1 tablet by mouth 2 (two) times daily. 180 tablet 3    dexAMETHasone (DECADRON) 4 MG Tab Take 1 tablet (4 mg total) by mouth every 12 (twelve) hours. 14 tablet 0    enzalutamide (XTANDI) 40 mg Cap Take 160 mg by mouth once daily. 120 capsule 3    HYDROcodone-acetaminophen (NORCO) 5-325 mg per tablet Take 1 tablet by mouth every 4 (four) hours as needed for Pain. 120 tablet 0    leuprolide (LUPRON) 1 mg/0.2 mL injection Inject into the skin every 3 (three) months. UNKNOWN DOSAGE      megestroL (MEGACE) 40 MG Tab Take 1 tablet (40 mg total) by mouth 2 (two) times daily. 60 tablet 3    morphine (MS CONTIN) 15 MG 12 hr tablet Take 1 tablet (15 mg total) by mouth every 8 (eight) hours as needed for Pain. 60 tablet 0    oxyCODONE (ROXICODONE) 5 MG immediate release tablet Take 1 tablet (5 mg total) by mouth every 4 (four) hours as needed. 180 tablet 0    pramipexole (MIRAPEX) 0.125 MG tablet Take 1 tablet (0.125 mg total) by mouth nightly. 30 tablet 2    traMADoL (ULTRAM) 50 mg tablet Take 1 tablet (50 mg total) by mouth every 4 (four) hours as needed for Pain. 60 tablet 0    valACYclovir (VALTREX) 500 MG tablet Take 1 tablet (500 mg total) by mouth 2 (two) times daily. 30 tablet 1     Current Facility-Administered Medications on File Prior to Visit   Medication Dose Route Frequency Provider Last Rate Last Dose    [COMPLETED] gadobutroL (GADAVIST) injection 7 mL  7 mL Intravenous ONCE PRN Rosario Grant NP   7 mL at 07/09/20 6350       Review of patient's allergies indicates:  No Known Allergies    Review of Systems    Constitutional: Negative for chills, fever, malaise/fatigue and weight loss.   HENT: Negative for ear pain, hearing loss and sore throat.    Eyes: Positive for double vision.   Respiratory: Negative for cough, hemoptysis, shortness of breath, wheezing and stridor.    Cardiovascular: Negative for chest pain and palpitations.   Gastrointestinal: Negative for abdominal pain, blood in stool, diarrhea, heartburn, nausea and vomiting.   Genitourinary: Negative for dysuria.   Musculoskeletal: Negative for back pain, myalgias and neck pain.   Skin: Negative for itching and rash.   Neurological: Negative for dizziness, tremors, seizures, weakness and headaches.   Psychiatric/Behavioral: Negative for depression. The patient is not nervous/anxious.         Vital Signs:   Vitals:    07/10/20 1328   BP: 126/85   Pulse: 87   Resp: 18   Temp: 97.6 °F (36.4 °C)         ECOG Performance Status: 1 - Ambulates, capable of light work    Physical exam:  Constitutional:  Thin man, no distress, wife present during visit.  Head: Normocephalic, atraumatic.    Eyes: Sclerae are non-icteric.  R eye ptosis and diplopia.  ENMT: Oral cavity and oropharynx are without lesions. Mucous membranes are moist.  Neck: Supple.  No palpable cervical or supraclavicular adenopathy.   Pulmonary: Clear to auscultation, bilaterally.  No rhonchi, rales or wheezes.  Cardiovascular: Regular rate and rhythm.   No murmurs heard.  No edema.  GI: Soft, nontender and nondistended.  No palpable masses or hepatosplenomegaly.  Musculoskeletal: No palpable spinous or paraspinous tenderness.  Range of motion appears normal in all 4 extremities.  Lymphatics: No palpable cervical, supraclavicular, axillary adenopathy.  Extremities: No clubbing, cyanosis, or edema.    Skin: No visible lesions.  Neurologic: CN II-XII are grossly intact.  Motor strength is 5/5 in bilateral upper and lower extremities and symmetric.    Sensory exam is grossly intact to touch.  Gait is normal.     Psychiatric: Patient is alert and oriented x 3.  Normal mood and affect.     Labs: I have personally reviewed the patient's available labs and reports and summarized pertinent findings above in HPI.    Imaging: I have personally reviewed the patient's available images and reports and summarized pertinent findings above in HPI.     Pathology: I have personally reviewed the patient's available pathology and summarized pertinent findings above in HPI.     Assessment:  Mr. Alves is a 54 year old man with metastatic resistant prostate cancer on Xofigo, who underwent palliative L hip XRT (8Gy in 1 fraction) with Dr. Young in 3/20. He subsequently developed R hip pain and underwent 8Gy in 1 fraction to his various bone mets in the right hip region on 4/21/20, overall tolerating therapy well. He presents today with worsening R vision deficits and ptosis, with imaging demonstrating base of skull and R Meckel's cave disease involvement.     Plan:  He is an appropriate candidate for palliative XRT to the base of skull and will undergo CT simulation today following this appointment. I plan to treat the involved areas with 5 fractions, pending additional treatment planning. I agree with medical oncology referrals to palliative care and hospice for additional discussion as well.    I discussed the logistics and potential side effects of treatment and all of the patient's questions were answered. He signed consent. He has my contact information should additional questions or concerns arise.      Kishor Blount MD  Radiation Oncology  [facility]

## 2020-07-10 NOTE — PROGRESS NOTES
Reviewed MRI with patient and neurosurgery, radiation oncology  Discussed with patient our review and plan   Steroid prescribed  XRT planned

## 2020-07-17 NOTE — TELEPHONE ENCOUNTER
Called patient for update.   States feels pretty good today.   Getting rest.  Vision ok and eyelid drooping improved ( wife confirmed).   Last xrt today - (5 total).   Questioning whether he should continue dexamethasone as was taking 4mg BID.   If further indicated, request RX [facility].

## 2020-07-22 NOTE — TELEPHONE ENCOUNTER
"----- Message from Raine Huerta sent at 7/22/2020 12:37 PM CDT -----  Patient Assist    Name of caller:  spouse   Provider name: Shawn FARAH MD   Contact Preference:  [phone]  Is this regarding current patient or new patient?: No   What is the nature of the call?    - unable to get the medication that's filled at [facility] pharmacy   [id number] -  Please call and assist with sending the script to the correct pharm listed.       Additional Notes:   "Thank you for all that you do for our patients'"          "

## 2020-07-24 NOTE — PROGRESS NOTES
Consult Note  Palliative Care      Consult Requested By: Rosario Grant  Reason for Consult: ACP in the setting of advanced prostate cancer    The patient location is: home in [place]  The chief complaint leading to consultation is: MSK pain related to metastatic disease    Visit type: audiovisual    Face to Face time with patient: 70 minutes of total time spent on the encounter, which includes face to face time and non-face to face time preparing to see the patient (eg, review of tests), Obtaining and/or reviewing separately obtained history, Documenting clinical information in the electronic or other health record, Independently interpreting results (not separately reported) and communicating results to the patient/family/caregiver, or Care coordination (not separately reported).         Each patient to whom he or she provides medical services by telemedicine is:  (1) informed of the relationship between the physician and patient and the respective role of any other health care provider with respect to management of the patient; and (2) notified that he or she may decline to receive medical services by telemedicine and may withdraw from such care at any time.    Notes:       ASSESSMENT/PLAN:     Plan/Recommendations:  Diagnoses and all orders for this visit:        Protein-calorie malnutrition, moderate  Malignant bone pain  Unintentional weight loss of 10% body weight within 6 months  Bone metastases  Neoplasm related pain  -all progressive despite best efforts  -symptom cluster of fatigue, anorexia, weight loss all due to advanced disease  -pain well controlled on current regimen      Palliative care by specialist  ACP (advance care planning)  -discussed at length with pt and wife today exploring their understanding of illness, expectations, acceptance, and hopes for the future  -pt very engaging freely discusses his acceptance of his disease and prognosis of perhaps weeks to months  -wife much more  hesitant to do so; leans more on her jessy for a miracle than preparing for life without her   -discussed the importnace of meaningful conversations now while still with good QOL about EOL preferences  -wife able to support pt in his EOL wishes to include:   -desires to minimize suffering   -avoid inappropriate prolongation of  the dying process; generally  avoiding hospitals and admissions   -would want to die at home  surrounded by family as the time  [place]   -would be accepting of hospice  when felt to be appropriate from  treatment team  20 minutes spent specifically discussing ACP    Previous exposure to serious illness:  Pt's mother in law recently  with hospice care in home setting    Goals of care:   Accepting of treatment options if they cont to contribute to QOL; wants to avoid hospital and hopes to spend time  At home    Understanding of disease: excellent    Follow up 1 month    Thank you for the opportunity to care for this patient and family.     Communicated with Dr. Escobar      SUBJECTIVE:     History of Present Illness:  Patient is a 54 y.o. year old male presenting as a referral from Dr. Escobar and Rosario Grant NP.  Pt presents from home with his wife via telemed visit.      Pt and wife very engaging.  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      He states he has had over 40 lb weight loss over the past few months.  Recently completed XRT to bilateral hips and base of skull due to bony met lesions.  Started on MS Contin 15 mg bid and oxy IR 5 mg for malignant back pain.  States pain is well controlled on this regimen.     He is still able to ambulate around the house and go for car rides.  Bathes and dresses himself.  Eats two meals a day and feels he gets what he wants.  Minimal nausea without emesis.Good BM routine    Currently not on any CTX due to cahnge in performance status but planning  "on starting additional therapy with Dr. Escobar.  Sees her next Friday on 7/31.    Onc history (per notes)  Oncologic History:  - 2015 having difficulty urinating- urgency and frequency  Saw Dr. Monson  PSA= 4.6 ng/ml and placed on Flomax  Advised to recheck in 1 month and it was 3.9 ng/ml  - early 2016 PSA- 2/16 = 7.8 ng/ml  - 2/16 prostate biopsy- told very aggressive [place]=9  Placed on lupron q 3 months at that time (last 10/18, due again 1/19)  MRI prostate  CT scans- seen in LNs and base of bladder  Bone scan- negative  - told inoperable  - referred to Dr. Kirkland, medical oncology  - port placed 3/21/16  Initiated Taxotere q 3 weeks x 6 (started on 3/25/16)  - PSA dropped significantly   - plan was to rescan and check PSA after 3 cycles- delays with insurance so done instead after 2 chemotherapy treatments in 5/19/2016  Told "cancer 98% dead" Lns back to normal size  - 5/26/18 PSA= 0.01 ng/ml  - 5/27/18 3rd cycle of chemo began  - tolerated chemotherapy well - did receive neupogen x 3 days post  - completed 6 cycles 7/29/16  - was doing scans q 6 months and blood work every 3 months  - Dr. Hoskins left [facility] May 2017- notified by letter  - Dr. Costello-   - 8/17 scans and labs stable  - Dr. Sethi was assigned most recently as medical oncologist  - July 2018 PSA= 1.68 ng/ml  - 10/1518 CT scans and bone scan  - 10/22/18 saw Dr. Sethi  PSA 1.58 ng/ml  LNs increased slightly on scans  Bone scan negative  Advised him to start Zytiga, started 11/6/18 (with Prednisone)  - concerned about medication and side effects  Feels like he did well on chemotherapy  Concerned about MD turnover  Sent to a chemo class and questions why for this regimen and then got a bill for a co-pay  - came here for a second opinion     -started Zytiga and Prednisone.   -progression noted recently and taxotere re-initiated.   -progression noted, plan to start Xofigo       Past Medical History:   Diagnosis Date    Prostate cancer     Chemo in " 2016     Past Surgical History:   Procedure Laterality Date    PORTACATH PLACEMENT       Family History   Problem Relation Age of Onset    Emphysema Mother     Heart attack Father     No Known Problems Brother     Cerebral aneurysm Maternal Aunt     No Known Problems Maternal Uncle     No Known Problems Paternal Aunt     No Known Problems Paternal Uncle     No Known Problems Maternal Grandmother     No Known Problems Maternal Grandfather     Colon cancer Paternal Grandmother     No Known Problems Paternal Grandfather     No Known Problems Brother     No Known Problems Son     No Known Problems Son      Review of patient's allergies indicates:  No Known Allergies    Medications:    Current Outpatient Medications:     calcium-vitamin D3 (OYSTER SHELL CALCIUM-VIT D3) 500 mg(1,250mg) -200 unit per tablet, Take 1 tablet by mouth 2 (two) times daily., Disp: 180 tablet, Rfl: 3    dexAMETHasone (DECADRON) 2 MG tablet, Take 1 tablet (2 mg total) by mouth 2 (two) times daily with meals. for 7 days, Disp: 30 tablet, Rfl: 0    enzalutamide (XTANDI) 40 mg Cap, Take 160 mg by mouth once daily. (Patient not taking: Reported on 7/10/2020.), Disp: 120 capsule, Rfl: 3    HYDROcodone-acetaminophen (NORCO) 5-325 mg per tablet, Take 1 tablet by mouth every 4 (four) hours as needed for Pain. (Patient not taking: Reported on 7/10/2020), Disp: 120 tablet, Rfl: 0    leuprolide (LUPRON) 1 mg/0.2 mL injection, Inject into the skin every 3 (three) months. UNKNOWN DOSAGE, Disp: , Rfl:     megestroL (MEGACE) 40 MG Tab, Take 1 tablet (40 mg total) by mouth 2 (two) times daily., Disp: 60 tablet, Rfl: 3    morphine (MS CONTIN) 15 MG 12 hr tablet, Take 1 tablet (15 mg total) by mouth every 8 (eight) hours as needed for Pain., Disp: 60 tablet, Rfl: 0    oxyCODONE (ROXICODONE) 5 MG immediate release tablet, Take 1 tablet (5 mg total) by mouth every 4 (four) hours as needed. (Patient not taking: Reported on 7/10/2020), Disp: 180  tablet, Rfl: 0    pramipexole (MIRAPEX) 0.125 MG tablet, Take 1 tablet (0.125 mg total) by mouth nightly. (Patient not taking: Reported on 7/10/2020), Disp: 30 tablet, Rfl: 2    traMADoL (ULTRAM) 50 mg tablet, Take 1 tablet (50 mg total) by mouth every 4 (four) hours as needed for Pain. (Patient not taking: Reported on 7/10/2020), Disp: 60 tablet, Rfl: 0    valACYclovir (VALTREX) 500 MG tablet, Take 1 tablet (500 mg total) by mouth 2 (two) times daily. (Patient not taking: Reported on 7/10/2020), Disp: 30 tablet, Rfl: 1    OBJECTIVE:       ROS:  Review of Systems  Constitutional: Positive for activity change, appetite change, fatigue and unexpected weight change. Negative for chills and fever.   HENT: Negative for postnasal drip.    Respiratory: Negative for cough, shortness of breath and wheezing.    Cardiovascular: Negative for chest pain, palpitations and leg swelling.   Gastrointestinal: Negative for abdominal distention, abdominal pain, blood in stool, constipation, diarrhea, nausea and vomiting.   Genitourinary: Negative for decreased urine volume, dysuria and frequency.   Musculoskeletal: Negative for arthralgias, gait problem, myalgias and neck pain.   Neurological: Negative for weakness, numbness and headaches.   Psychiatric/Behavioral: Negative for dysphoric mood. The patient is not nervous/anxious.      Advance Care Planning   Review of Symptoms    Symptom Assessment (ESAS 0-10 Scale)  Pain:  3  Dyspnea:  0  Anxiety:  0  Nausea:  0  Depression:  2  Anorexia:  3  Fatigue:  3  Insomnia:  0  Restlessness:  0  Agitation:  0     CAM / Delirium:  Negative  Constipation:  Negative  Diarrhea:  Negative    Bowel Management Plan (BMP):  Yes      Pain Assessment:  Location(s): back and leg    BACK Location: hips.       Quality:  Aching and dull       Quantity:  4/10 in intensity       Chronicity: , gradually improving       Aggravating Factors:  Movement    OME in 24 hours:  45    Performance Status:  60    ECOG  Performance Status Grade:  3 - Confined to bed or chair 50% of waking hours    Advanced Directives:  Living Will: No    LaPOST: No    Do Not Resuscitate Status: No    Medical Power of : Yes     Agent's Name:  KRYSTIN CENTENO Spouse     [phone]   .     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   Chronically ill appearing; very pleasant and engaging   HENT:   Head: Atraumatic.   bitemp wasting   Eyes: No scleral icterus.   Pulmonary/Chest: Effort normal.   Abdominal: He exhibits no distension.   Neurological: He is alert and oriented to person, place, and time. No cranial nerve deficit. GCS score is 15.     Further exam limited by telemed visit    Labs:  CBC:   WBC   Date Value Ref Range Status   06/16/2020 9.00 3.90 - 12.70 K/uL Final     Hemoglobin   Date Value Ref Range Status   06/16/2020 10.3 (L) 14.0 - 18.0 g/dL Final     Hematocrit   Date Value Ref Range Status   06/16/2020 33.1 (L) 40.0 - 54.0 % Final     Mean Corpuscular Volume   Date Value Ref Range Status   06/16/2020 80 (L) 82 - 98 fL Final     Platelets   Date Value Ref Range Status   06/16/2020 299 150 - 350 K/uL Final           LFT:   Lab Results   Component Value Date    AST 18 06/16/2020    ALKPHOS 629 (H) 06/16/2020    BILITOT 0.3 06/16/2020       Albumin:   Albumin   Date Value Ref Range Status   06/16/2020 2.8 (L) 3.5 - 5.2 g/dL Final     Protein:   Total Protein   Date Value Ref Range Status   06/16/2020 6.9 6.0 - 8.4 g/dL  Final       Radiology:I have reviewed all pertinent imaging results/findings within the past 24 hours.  Impression:     In this patient with metastatic prostate cancer, there is redemonstration of metastatic involvement of the periaortic and iliac chain lymph nodes as well as scattered osseous metastases; overall stable disease from most recent prior CT.  Interim development new pleural thickening adjacent to the anterolateral aspect of the right 3rd rib.     Stable right adrenal adenoma.     Majority of the colon present on the right with the majority of small bowel present on the left, finding consistent with congenital nonrotation.    > 50% of 70 min visit spent in chart review, face to face discussion of goals of care,  symptom assessment, coordination of care and emotional support.      Signature: Ady Briones MD

## 2020-07-31 NOTE — Clinical Note
RTC 8/5 to see Dr. Escobar (needs MD) with cbc, cmp and Jevtana. Please move someone to my schedule if she does not have an opening.

## 2020-07-31 NOTE — PROGRESS NOTES
Subjective:       Patient ID: Martin Alves is a 54 y.o. male.    Chief Complaint: Prostate cancer metastatic to intrapelvic lymph node    HPI     Here for follow of metastatic prostate cancer. Recent progression and here to start Jevtana.     In the interval, developed R diplopia and R ptosis.   MRI brain 7/9/2020:  Impression:  Expansile heterogeneous enhancing lesion centered within the right Meckel's cave.  Separately there is heterogeneous enhancement within the sella with enlargement of pituitary as well as enhancement along the infundibulum which is thickened.  In light of history of prostate cancer unusual metastatic disease to be included in differential with nerve sheath tumor and hypophysitis respectively to be included in differential. Clinical correlation and correlation with CSF analysis advised.  There is abnormal bone marrow signal within the skull base and visualized cervical spine may be related to post treatment change.  Punctate region of enhancement within the mandibular condyles concerning for possible osseous metastases.  Ill-defined T2/FLAIR hyperintensity supratentorial white matter and kristy while nonspecific concerning for chronic ischemic change.  No restricted diffusion to suggest acute infarction.     He completed 5 fractions of palliative XRT to base of skull.     Today, continues to be weak.   Pain regimen controlling pain.   Morphine BID.  Not requiring oxycodone as often.   Reports continued weight loss  Reports Monday night joint pain all over.   Lost 12 lbs.   Occasional constipation- relieved with OTC supportive meds.   No fever/chills. No n/v/d/c.   Reports - Smoked marijuana couple of days ago and had pain relief and increased appetite.       6/2020 CT scans:  COMPARISON:  MRI abdomen pelvis 03/10/2020, CT abdomen pelvis 02/20/2020, CT chest abdomen pelvis 11/11/2019     FINDINGS:  Thoracic soft tissues: No significant abnormality.     Aorta: Left-sided aortic arch  which demonstrates 2 branch vessel pattern noting common origin of the brachiocephalic and left common carotid arteries.  Aorta maintains normal caliber, contour, and course with minimal calcific atherosclerosis.     Heart: Along the upper limit of normal in size with trace pericardial effusion.     Hayde/Mediastinum: No significant lymphadenopathy     Lungs: New focus pleural thickening adjacent to the anterolateral aspect of the right 3rd rib possibly signifying progression of metastatic disease.  Dependent atelectasis.  Stable pulmonary nodule in the left upper lobe (axial series 2, image 45).  No new lesions.     Liver: Normal in size with redemonstration of numerous scattered hypodensities with those large enough to characterize demonstrating characteristics compatible with simple cysts.  No new lesions.     Gallbladder: No calcified gallstones.     Bile Ducts: No evidence of dilated ducts.     Pancreas: Pancreatic head and uncinate process present.  Pancreatic body and tail not visualized on today's exam possibly representing congenital anomaly or prior distal pancreatectomy.     Spleen: Stable appearance of the spleen with scattered adjacent splenules.     Adrenals: Stable hypoattenuating right adrenal nodule previously characterized as an adenoma.  Prominence of the left adrenal gland without definitive nodularity.     Kidneys/ Ureters: Normal in size and location without evidence of suspicious renal mass or hydroureteronephrosis.     Bladder: No evidence of abnormal wall thickening to suggest active inflammation.  Left anterior bladder ear adjacent to left inguinal hernia.     Reproductive organs: Unremarkable.     GI Tract/Mesentery: Majority of the colon present on the right with majority of the small bowel present on the left, a finding consistent with congenital non rotation.  No evidence of high-grade bowel obstruction or active inflammatory process.     Peritoneal Space: No ascites. No free  air.     Retroperitoneum:  Redemonstration of abnormal periaortic and iliac chain lymphadenopathy.  Largest periaortic lymph node measures 1.8 cm in short axis diameter (axial series 2, image 126), previously 1.8 cm.  Largest right iliac chain lymph node measures 1.9 cm (axial series 2, image 170), previously 2.0 cm.  Previously mentioned periaortic and right iliac chain lymph nodes measures similar size (axial series 2, image 142 and 150 respectively).     Abdominal wall:  Bilateral fat containing inguinal hernias.     Vasculature: Abdominal aorta maintains normal caliber, contour, and course with minimal calcific atherosclerosis.     Bones: Redemonstration of numerous scattered blastic osseous lesions with the most prominent involving the left aspect of the T7 vertebral body, lateral aspect of the left 7th rib, and posterior left aspect of the L3 vertebral body.  Overall, osseous lesions are much better appreciated on prior MRI.  Stable superimposed degenerative changes without evidence of acute fracture.     Impression:     In this patient with metastatic prostate cancer, there is redemonstration of metastatic involvement of the periaortic and iliac chain lymph nodes as well as scattered osseous metastases; overall stable disease from most recent prior CT.  Interim development new pleural thickening adjacent to the anterolateral aspect of the right 3rd rib.     Stable right adrenal adenoma.     Majority of the colon present on the right with the majority of small bowel present on the left, finding consistent with congenital nonrotation.     Additional, stable findings as above.            Oncologic History:  - 2015 having difficulty urinating- urgency and frequency  Saw Dr. Monson  PSA= 4.6 ng/ml and placed on Flomax  Advised to recheck in 1 month and it was 3.9 ng/ml  - early 2016 PSA- 2/16 = 7.8 ng/ml  - 2/16 prostate biopsy- told very aggressive [place]=9  Placed on lupron q 3 months at that time (last 10/18,  "due again 1/19)  MRI prostate  CT scans- seen in LNs and base of bladder  Bone scan- negative  - told inoperable  - referred to Dr. Kirkland, medical oncology  - port placed 3/21/16  Initiated Taxotere q 3 weeks x 6 (started on 3/25/16)  - PSA dropped significantly   - plan was to rescan and check PSA after 3 cycles- delays with insurance so done instead after 2 chemotherapy treatments in 5/19/2016  Told "cancer 98% dead" Lns back to normal size  - 5/26/18 PSA= 0.01 ng/ml  - 5/27/18 3rd cycle of chemo began  - tolerated chemotherapy well - did receive neupogen x 3 days post  - completed 6 cycles 7/29/16  - was doing scans q 6 months and blood work every 3 months  - Dr. Hoskins left [facility] May 2017- notified by letter  - Dr. Costello-   - 8/17 scans and labs stable  - Dr. Sethi was assigned most recently as medical oncologist  - July 2018 PSA= 1.68 ng/ml  - 10/1518 CT scans and bone scan  - 10/22/18 saw Dr. Sethi  PSA 1.58 ng/ml  LNs increased slightly on scans  Bone scan negative  Advised him to start Zytiga, started 11/6/18 (with Prednisone)  - concerned about medication and side effects  Feels like he did well on chemotherapy  Concerned about MD turnover  Sent to a chemo class and questions why for this regimen and then got a bill for a co-pay  - came here for a second opinion     -started Zytiga and Prednisone.   -progression noted recently and taxotere re-initiated.   -progression noted, plan to start Xofigo  -progression noted, Xtandi  -progression noted; MRI brain abnormal, s/p XRT to base of skull. See above.  -plans to start Jevtana.           Review of Systems   Constitutional: Positive for activity change, appetite change, fatigue and unexpected weight change. Negative for chills and fever.   HENT: Negative for postnasal drip.    Respiratory: Negative for cough, shortness of breath and wheezing.    Cardiovascular: Negative for chest pain, palpitations and leg swelling.   Gastrointestinal: Negative for " abdominal distention, abdominal pain, blood in stool, constipation, diarrhea, nausea and vomiting.   Genitourinary: Negative for decreased urine volume, dysuria and frequency.   Musculoskeletal: Negative for arthralgias, gait problem, myalgias and neck pain.   Neurological: Negative for weakness, numbness and headaches.   Psychiatric/Behavioral: Negative for dysphoric mood. The patient is not nervous/anxious.          Objective:      Physical Exam  Vitals signs and nursing note reviewed.   Constitutional:       General: He is not in acute distress.     Appearance: Normal appearance.      Comments: Presents with his wife  ECOG= 1  thinner   HENT:      Head: Normocephalic and atraumatic.   Eyes:      General: No scleral icterus.     Extraocular Movements: Extraocular movements intact.      Conjunctiva/sclera: Conjunctivae normal.      Pupils: Pupils are equal, round, and reactive to light.   Cardiovascular:      Rate and Rhythm: Normal rate and regular rhythm.      Heart sounds: No murmur.   Pulmonary:      Effort: Pulmonary effort is normal.      Breath sounds: Normal breath sounds.   Abdominal:      General: Bowel sounds are normal.      Palpations: Abdomen is soft.   Musculoskeletal: Normal range of motion.   Skin:     General: Skin is warm and dry.   Neurological:      Mental Status: He is alert and oriented to person, place, and time.   Psychiatric:         Mood and Affect: Mood normal.      Labs- reviewed  Recent imaging- reviewed   Assessment:       1. 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 Bone metastases    3. Anorexia    4. Antineoplastic chemotherapy induced anemia    5. Neoplasm related pain        Plan:       Continues to be weak and with more weight loss. Discussed PS. Will not start Jevtana today.     -Increase megace 400mg (10ml)  daily- liquid Megace prescribed. We discussed, Ok to take 800mg (20ml)  daily if helps.   -Labs reviewed, overall stable.   -continue pain regimen.   -RTC  in 1 week to see Dr. Escobar with cbc, cmp and Jevtana. (previously consented.)    Patient states he does not want hospice as he and his wife are hoping for a miracle. Support and reassurance given.       Patient is in agreement with the proposed treatment plan. All questions were answered to the patient's satisfaction. Pt knows to call clinic for any new or worsening symptoms and if anything is needed before the next clinic visit.      JOSELYN OlivaresP-C  Hematology & Oncology  [facility] [place] [address]  ph. [phone]  Fax. [phone]     I spent 40 minutes (face to face) with the patient, more than 50% was in counseling and coordination of care as detailed above.

## 2020-08-05 NOTE — TELEPHONE ENCOUNTER
"Contacted pt and informed that unfortunately Dr. Escobar will be at [facility] all afternoon and offered r/s appts at [facility] location- EP and chemo.   Pt stated does not want to "make his wife go to [place]."  Pt stated just saw NP on Friday and "does not want chemo yet as still feels weak and losing weight."  Asked pt if feels need to be seen today in urgent care and if weakness worse; pt stated that weakness is baseline and does not feel need to be seen today.   Pt reports pain but states is controlled with his pain medication.   Offered pt appt on 8/10 with Dr. Escobar- labs at 12, and EP at 1 with possible chemo to follow.   Pt verbalized understanding and thanked nurse.      Message sent to scheduling to set up chemo appt.  "

## 2020-08-06 NOTE — TELEPHONE ENCOUNTER
Returned call to pt.  Pt stated that he is having difficulty urinating- reports drinking plenty fluids.   Pt denies any burning with urination or blood in urine and denies any fevers.   Pt stated had issue in past and Flomax resolved.  Informed pt would discuss with NP and f/u.  Pt verbalized understanding.    Message fwd.        ----- Message from Raine Huerta sent at 8/6/2020  2:18 PM CDT -----  Caller: Krystin  Physician: Shawn FARAH MD  Ph: [phone]  Reason for the call:    - pt has issues urinating, the current medications he has aren't working  Would like to speak with the MD or staff to discuss other options to   address this concern. Not clear if if he will have to come in before his next appt.

## 2020-08-06 NOTE — TELEPHONE ENCOUNTER
Called pt and scheduled for UC visit tomorrow.  Pt is urinating just having difficulty urinating.  Pt verbalized understanding.      Denisa Couch NP  You 3 minutes ago (3:16 PM)     This could be disease. If not urinating, needs to go to ED.   Rosario agreed to see in UC tomorrow if he is urinating.

## 2020-08-07 NOTE — PROGRESS NOTES
"Subjective:       Patient ID: Martin Alves is a 54 y.o. male.    Chief Complaint:   Difficulty Urinating    HPI URGENT CARE VISIT PATIENT OF DR. STAPLETON'S  Patient is a metastatic prostate cancer patient of Dr. Hemphill. He notes a couple of days ago he went to urinate, but was only able to have a trickle out. He notes that this has continued over the last couple of days. He has to forced out the urine. He does note that he feels he is able to empty is bladder, but it very slow. Denies pruning. Positive for pressure. Denies blood in the urine. He does drink plenty of fluids all day. Wife notes he was warm to touch yesterday, but did not take temperature.     He notes he is sleeping more. He is eating less. He has lost weight. Performance status poor. States he has good days and bad days, but more bad days recently.  Fairly homebound. Decrease in activity.      /80 (BP Location: Left arm, Patient Position: Sitting, BP Method: Large (Automatic))   Pulse 103   Temp 98.1 °F (36.7 °C) (Oral)   Resp 18   Ht 5' 9" (1.753 m)   Wt 62 kg (136 lb 11 oz)   SpO2 98%   BMI 20.18 kg/m²     Review of Systems   Constitutional: Positive for activity change (decreased), appetite change (decreased), fatigue and unexpected weight change (decreased). Negative for chills, diaphoresis and fever.   Respiratory: Negative for cough and shortness of breath.    Gastrointestinal: Negative for abdominal distention, abdominal pain, blood in stool, constipation, diarrhea, nausea and vomiting.   Genitourinary: Positive for decreased urine volume and difficulty urinating (urine trickling out). Negative for frequency, hematuria and urgency.        Pressure in lower abdomen    Skin: Positive for color change (yellow tinge to skin; bilirubin WNL) and pallor.   Neurological: Positive for weakness. Negative for dizziness, light-headedness and headaches.       Objective:      Physical Exam  Vitals signs reviewed.   Constitutional:     "   General: He is not in acute distress.     Appearance: He is well-developed. He is ill-appearing.      Comments: Thin appearing male  In wheel chair  Wife and son present    HENT:      Head: Normocephalic.      Right Ear: External ear normal.      Left Ear: External ear normal.      Nose: Nose normal.   Eyes:      General: No scleral icterus.     Conjunctiva/sclera: Conjunctivae normal.      Pupils: Pupils are equal, round, and reactive to light.   Neck:      Musculoskeletal: Normal range of motion and neck supple.   Cardiovascular:      Rate and Rhythm: Normal rate and regular rhythm.      Heart sounds: Normal heart sounds.   Pulmonary:      Effort: Pulmonary effort is normal.      Breath sounds: Normal breath sounds.   Abdominal:      General: Bowel sounds are normal. There is no distension.      Palpations: Abdomen is soft.      Tenderness: There is no abdominal tenderness.   Musculoskeletal:      Comments: Very weak   Lymphadenopathy:      Cervical: No cervical adenopathy.   Skin:     General: Skin is warm and dry.      Coloration: Skin is pale.      Nails: There is no clubbing.        Comments: Yellow tinge to skin; bilirubin normal   Neurological:      Mental Status: He is alert and oriented to person, place, and time.   Psychiatric:         Mood and Affect: Mood is not anxious.         Behavior: Behavior normal.         Thought Content: Thought content normal.         Assessment:       1. 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 Bone metastases    3. Neoplasm related pain    4. Difficulty urinating    5. Acute cystitis without hematuria    6. Constipation, unspecified constipation type        Plan:     1-2. Follow up as regularly scheduled, next week with Dr. Escobar  3. Continue current pain regimen  4-5. UA pending; prescribed Macrobid x 7 days for possible UTI - awaiting       Medication List with Changes/Refills   Current Medications    CALCIUM-VITAMIN D3 (OYSTER SHELL CALCIUM-VIT D3)  500 MG(1,250MG) -200 UNIT PER TABLET    Take 1 tablet by mouth 2 (two) times daily.    DEXAMETHASONE (DECADRON) 2 MG TABLET    Take 1 tablet (2 mg total) by mouth 2 (two) times daily with meals.    ENZALUTAMIDE (XTANDI) 40 MG CAP    Take 160 mg by mouth once daily.    HYDROCODONE-ACETAMINOPHEN (NORCO) 5-325 MG PER TABLET    Take 1 tablet by mouth every 4 (four) hours as needed for Pain.    LEUPROLIDE (LUPRON) 1 MG/0.2 ML INJECTION    Inject into the skin every 3 (three) months. UNKNOWN DOSAGE    MEGESTROL (MEGACE) 400 MG/10 ML (40 MG/ML) SUSP    Take 10 mLs (400 mg total) by mouth once daily.    MORPHINE (MS CONTIN) 15 MG 12 HR TABLET    Take 1 tablet (15 mg total) by mouth every 8 (eight) hours as needed for Pain.    OXYCODONE (ROXICODONE) 5 MG IMMEDIATE RELEASE TABLET    Take 1 tablet (5 mg total) by mouth every 4 (four) hours as needed.    PRAMIPEXOLE (MIRAPEX) 0.125 MG TABLET    Take 1 tablet (0.125 mg total) by mouth nightly.    TRAMADOL (ULTRAM) 50 MG TABLET    Take 1 tablet (50 mg total) by mouth every 4 (four) hours as needed for Pain.    VALACYCLOVIR (VALTREX) 500 MG TABLET    Take 1 tablet (500 mg total) by mouth 2 (two) times daily.     Return to clinic in 1 week with MD appointment and labs.     Patient is in agreement with the proposed treatment plan. All questions were answered to the patient's satisfaction. Patient knows to call clinic for any new or worsening symptoms and if anything is needed before the next clinic visit.          Rosario Grant, FNP-C  Hematology & Medical Oncology   [address] [place] [address]  ph. [phone]  Fax. [phone]    Patient dicussed with collaborating physician, Dr. Escobar.

## 2020-08-10 NOTE — TELEPHONE ENCOUNTER
Spoke with Dr Escobar, she agreed to see him virtually.  Called pt's wife to let her know his appt was switched over.     ----- Message from Corey Rodrgiuez sent at 8/10/2020 11:18 AM CDT -----  Contact: Krystin (spouse)  Patient Advice/Staff Message     Caller name/title: Krystin (spouse)    Provider: Shawn    Reason for call: Wants to know if today's visit can be done virtually, pt is in a lot of pain and not up for coming to the office.    Do you feel you need to be seen today:: Yes        Communication Preference: [phone]    Additional Information:

## 2020-08-10 NOTE — PROGRESS NOTES
"Subjective:       Patient ID: Martin Alves is a 54 y.o. male.    Chief Complaint: No chief complaint on file.    HPI       The patient location is: home  The chief complaint leading to consultation is: follow up prostate cancer  Unable to come due to pain    Visit type: audiovisual    Face to Face time with patient: 25 minutes  25 minutes of total time spent on the encounter, which includes face to face time and non-face to face time preparing to see the patient (eg, review of tests), Obtaining and/or reviewing separately obtained history, Documenting clinical information in the electronic or other health record, Independently interpreting results (not separately reported) and communicating results to the patient/family/caregiver, or Care coordination (not separately reported).     Each patient to whom he or she provides medical services by telemedicine is:  (1) informed of the relationship between the physician and patient and the respective role of any other health care provider with respect to management of the patient; and (2) notified that he or she may decline to receive medical services by telemedicine and may withdraw from such care at any time.    Notes:     1. Increasing pain   Reports all over the place, areas change  Today it is the shoulders  This weekend a lot of knee pain  Pain in back is about 50% better; though aggravated when he gets out of bed    2. Dysuria and frequency  Better on medication  No fevers or chills from what they can tell  Wife notes slightly warm at times  No night sweats    3. Appetite  States no appetite at all  "No desire to eat"  He is trying to push himself  Wife states she will bring in smaller meals frequently  Root beer floats and Ensure, [employer] frapaccino  Really not eating any solids- sometimes fruit cup, apple sauce  Denies dysphagia- states takes forever to chew  Still losing weight- down to 136 lbs (2 lbs additionally)    4. Fatigue- sleeps more    Rare " headaches  Denies dizziness; better after XRT  Vision - states when he wears glasses he does pretty well and denies seeing double now but if he goes outside looks funny  Smoking marijuana- states it hels with pain and anxiety      Review of Systems   Constitutional: Positive for activity change, appetite change, fatigue and unexpected weight change. Negative for chills, diaphoresis and fever.   Respiratory: Positive for shortness of breath (with exertion). Negative for cough and wheezing.    Cardiovascular: Negative for chest pain, palpitations and leg swelling.   Gastrointestinal: Positive for constipation (stated x 6 days). Negative for abdominal distention, abdominal pain, change in bowel habit, diarrhea, nausea, vomiting and change in bowel habit.   Genitourinary: Negative for decreased urine volume, difficulty urinating and dysuria.   Musculoskeletal: Positive for arthralgias, back pain and gait problem. Negative for myalgias and neck pain.   Neurological: Positive for weakness, numbness and headaches. Negative for seizures.   Hematological: Negative for adenopathy. Does not bruise/bleed easily.   Psychiatric/Behavioral: Positive for dysphoric mood and sleep disturbance. The patient is nervous/anxious.          Objective:      Physical Exam  Constitutional:       Comments: Virtual Visit         Assessment:       1. Metastasis from hormone-refractory prostate cancer        Plan:     Palliative Care/Hospice recommended

## 2020-08-11 NOTE — PROGRESS NOTES
In response to in basket message from Dr. Escobar, DON called pt to discuss in home palliative.  Sw spent 20 minutes speaking to pt's wife and answered questions related to the difference between palliative and hospice care among other questions.  Caregiver expressed understanding and it was agreed that DON would arrange an informational visit with [facility] as caregiver was not interested in hospice only.    After--Don spoke to Annia at [facility] ([phone]) and provided pt information along with SW contact information.  Annia will contact pt directly and arrange and informational visit.    DON provided name and contact information and encouraged pt caregiver to call with any future needs.

## 2020-08-13 NOTE — PROGRESS NOTES
In response to phone call from Rosario ([facility]) stating that pt is has agreed to palliative care services from her agency, [facility] called pt and his wife to confirm. They agreed that they would like in home palliative service from [facility]. [facility] faxed palliative care orders to Rosario ([phone]  [phone]).

## 2020-08-17 PROBLEM — E83.39 HYPOPHOSPHATEMIA: Status: RESOLVED | Noted: 2020-01-01 | Resolved: 2020-01-01

## 2020-08-17 NOTE — TELEPHONE ENCOUNTER
"----- Message from Raine Huerta sent at 8/17/2020  8:19 AM CDT -----  Patient Assist    Name of caller:  spouse   Provider name:  Shawn FARAH MD  Contact Preference:   [phone]  Current patient or new patient?: current   Does Patient feel the need to see the MD today? No   What is the nature of the call?    - refill requested for listed scripts and pa's are needed:   morphine (MS CONTIN) 15 MG 12 hr tablet    oxyCODONE (ROXICODONE) 5 MG immediate release tablet   Pharmacy: [id number]     Additional Notes:   "Thank you for all that you do for our patients'"          "

## 2020-08-19 NOTE — PROGRESS NOTES
[facility]  Palliative Care Home Visit    Visit Date: 8/19/2020  Encounter Provider: Rachelle Hawk DNP, FNP-C  PCP:  Sudhir Tenorio MD    Subjective:      Patient ID: Martin Alves is a 55 y.o. male.    Consult Requested By: Herlinda Escobar M.D.   Reason for Consult:  Palliative Care       Chief Complaint: Establish Palliative Care     Outpatient Palliative Care Encounter:    PMHx:  Mr. Martin Alves is a 56 y/o male with PMHx of metastatic prostate cancer to intrapelvic lymph node, bones metastases, neoplasm related pain, anemia, antineoplastic chemotherapy induced anemia, and restless leg syndrome (uncontrolled).     PSHx:  Past surgical history includes portacath placement.     Social History:  Patient has been  to his wife Krystin for 28 years. They have no children together; however, patient has 2 step children (1 son and 1 daughter), both living. Patient has 2 siblings (2 brothers), both living. 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 Patient lives at home with his wife and step daughter.     Impression:  Patient seen today to establish palliative care services. Patient's wife Krystin and niece Zach are both present, along with Mr. Alves. Patient is sitting on the sofa. He appears ill, along with thin, frail, and cachectic. He is Awake, alert, and oriented x 3 person,  Place, and time. He seems to be frustrated in the care he has received from oncologist and would like to possibly seek a second opinion from Dr. Kirkland, oncologist in [place]. Reviewed patients imaging results, labs, and notes from Dr. Escobar. Patient reports he fell yesterday on 8/18/2020 when in the shower, and he states he fell on his left side hurting his back and rib cage; he reports hitting the back of his head but did not injure himself. He did not have LOC; denies  "dizziness, blurred vision, syncope. Left shoulders with scratches noted.    NP reviewed [place] form and all questions questions on form with options; patient elects to discuss with his wife. Patient would like to be a DNR;patient expresses these are his wishes, but his wife is emotional and crying, states she "I want him to fight and not give up."    Extensively Discussed hospice with patient and his wife, but they would like to continue with palliative care and follow-up with  before considering hospice evaluation. This is something the patient and family feel they need to do by obtaining a 2nd opinion, which I think he needs to pursue if this is his wishes.        Goals of Care:  I initiated the process of advance care planning today and explained the importance of this process to the patient and family.  I introduced the concept of advance directives to the patient, as well. Then the patient received detailed information about the importance of designating a Health Care Power of  (HCPOA). He was also instructed to communicate with this person about his wishes for future healthcare, should he become sick and lose decision-making capacity.    I Introduced [place] form with patient/family, explaining this is the patient's wishes, and this form will be uploaded into the patient's [facility] and the [place] Registry. Each question on the [place] form was reviewed with the patient and family. Left form at patient's house for him to complete and return to NP on next visit. DNR is his wishes.    Goals would like to gain weight and receive chemo but does not want it to debilitate him. Would like to try. He would like to obtain a second opinion from another oncology physician.      PLAN:  1. Palliative care NP is to follow-up with patient in the next 2-4 weeks on 9/15/2020  2. Continue all medications as tolerated  3. Encouraged patient to continue steroid and appetite stimulant  4. Recommend referral " to hospice for evaluation  5. Patient to f/u with Dr. Kirkland, oncologist for 2nd opinion  6. Offer supportive care to patient and family  7. In an emergency, call 911 or go to the ED; notify PCP or Palliative care NP  8. Patient/family to complete [place] form   9. Continue MS continue 15mg Q 12 hours for pain       Review of Systems   Constitutional: Positive for activity change (no ambulating as much), appetite change (decreased appetite), fatigue and unexpected weight change (was 192 now 136). Negative for chills and fever.   HENT: Negative for postnasal drip, rhinorrhea, sore throat and trouble swallowing.    Respiratory: Positive for shortness of breath (sob with exertion). Negative for cough and chest tightness.    Cardiovascular: Negative for chest pain, palpitations and leg swelling.   Gastrointestinal: Negative for abdominal distention, abdominal pain, constipation, diarrhea and nausea.        Abdominal pressure   Endocrine: Negative for polyuria.   Genitourinary: Negative for difficulty urinating.   Musculoskeletal: Positive for back pain and gait problem. Negative for neck pain and neck stiffness.   Skin: Negative for rash and wound.        Left shoulder with scratches   Neurological: Positive for weakness. Negative for dizziness, seizures, syncope, light-headedness and headaches.   Hematological: Bruises/bleeds easily.   Psychiatric/Behavioral: Positive for agitation and sleep disturbance. Negative for confusion and hallucinations. The patient is not nervous/anxious.        Assessments:  · Environmental: single story house, clean, uncluttered, good lighting  · Functional Status: able to bathe, dress and feed himself  · Safety: fall precautions, and covid 19 virus precautions  · Nutritional: 1 meal a day; snacks through the day; Ensure daily (poor appetite)  · Home Health/DME/Supplies: cane,   · Has Home Health ??    Ordered Shower chair from DME; however, family notified there will be a cost for the  chair.     Symptom Assessment (ESAS 0-10 scale)       ESAS 0 1 2 3 4 5 6 7 8 9 10   Pain        x      Dyspnea x             Anxiety x             Nausea x             Depression       x        Anorexia      x        Fatigue       x       Insomnia     x         Restlessness  x             Agitation        x        Constipation    no  Bowel Management Plan (BMP): no  Diarrhea        no  Comments: LBM 8/18 x 3 normals; Taking Sennakot    Performance Status:   PPS Score: 60%  Karnofsky Score: 60%   EGOC:  2 (almost 3)    History:  Past Medical History:   Diagnosis Date    Prostate cancer     Chemo in 2016     Family History   Problem Relation Age of Onset    Emphysema Mother     Heart attack Father     No Known Problems Brother     Cerebral aneurysm Maternal Aunt     No Known Problems Maternal Uncle     No Known Problems Paternal Aunt     No Known Problems Paternal Uncle     No Known Problems Maternal Grandmother     No Known Problems Maternal Grandfather     Colon cancer Paternal Grandmother     No Known Problems Paternal Grandfather     No Known Problems Brother     No Known Problems Son     No Known Problems Son      Past Surgical History:   Procedure Laterality Date    PORTACATH PLACEMENT       Review of patient's allergies indicates:  No Known Allergies    Medications:    Current Outpatient Medications:     calcium-vitamin D3 (OYSTER SHELL CALCIUM-VIT D3) 500 mg(1,250mg) -200 unit per tablet, Take 1 tablet by mouth 2 (two) times daily., Disp: 180 tablet, Rfl: 3    leuprolide (LUPRON) 1 mg/0.2 mL injection, Inject into the skin every 3 (three) months. UNKNOWN DOSAGE, Disp: , Rfl:     megestroL (MEGACE) 400 mg/10 mL (40 mg/mL) Susp, Take 10 mLs (400 mg total) by mouth once daily., Disp: 240 mL, Rfl: 2    oxyCODONE (ROXICODONE) 5 MG immediate release tablet, Take 1 tablet (5 mg total) by mouth every 4 (four) hours as needed., Disp: 180 tablet, Rfl: 0    tamsulosin (FLOMAX) 0.4 mg Cap, , Disp: ,  "Rfl:     enzalutamide (XTANDI) 40 mg Cap, Take 160 mg by mouth once daily. (Patient not taking: Reported on 8/19/2020.), Disp: 120 capsule, Rfl: 3    HYDROcodone-acetaminophen (NORCO) 5-325 mg per tablet, Take 1 tablet by mouth every 4 (four) hours as needed for Pain. (Patient not taking: Reported on 7/10/2020), Disp: 120 tablet, Rfl: 0    lactulose (CHRONULAC) 10 gram/15 mL solution, TAKE 15 ML BY MOUTH THREE TIMES DAILY AS NEEDED, Disp: 473 mL, Rfl: 0    morphine (MS CONTIN) 15 MG 12 hr tablet, Take 1 tablet (15 mg total) by mouth every 8 (eight) hours as needed for Pain., Disp: 90 tablet, Rfl: 0    ondansetron (ZOFRAN-ODT) 8 MG TbDL, Take 1 tablet (8 mg total) by mouth every 8 (eight) hours as needed (nausea)., Disp: 30 tablet, Rfl: 2    pramipexole (MIRAPEX) 0.125 MG tablet, Take 1 tablet (0.125 mg total) by mouth nightly. (Patient not taking: Reported on 7/10/2020), Disp: 30 tablet, Rfl: 2    traMADoL (ULTRAM) 50 mg tablet, Take 1 tablet (50 mg total) by mouth every 4 (four) hours as needed for Pain. (Patient not taking: Reported on 7/10/2020), Disp: 60 tablet, Rfl: 0    valACYclovir (VALTREX) 500 MG tablet, Take 1 tablet (500 mg total) by mouth 2 (two) times daily. (Patient not taking: Reported on 7/10/2020), Disp: 30 tablet, Rfl: 1    24h Oral Morphine Equivalents (OME):  n/a    Objective:     Physical Exam:  Vitals:    08/19/20 1102   BP: 124/64   Pulse: 76   Resp: 20   Temp: 98.1 °F (36.7 °C)   TempSrc: Temporal   SpO2: 97%   Weight: 61.7 kg (136 lb)   Height: 5' 9" (1.753 m)   PainSc: 0-No pain     Body mass index is 20.08 kg/m².    Physical Exam  Vitals signs and nursing note reviewed.   Constitutional:       Appearance: He is ill-appearing.   HENT:      Head: Normocephalic and atraumatic.      Nose: Nose normal. No congestion or rhinorrhea.      Mouth/Throat:      Mouth: Mucous membranes are moist.      Pharynx: Oropharynx is clear.   Eyes:      Extraocular Movements: Extraocular movements " intact.      Conjunctiva/sclera: Conjunctivae normal.   Neck:      Musculoskeletal: Normal range of motion and neck supple. No neck rigidity.   Cardiovascular:      Rate and Rhythm: Normal rate and regular rhythm.      Pulses: Normal pulses.      Heart sounds: Normal heart sounds. No murmur.   Pulmonary:      Effort: Pulmonary effort is normal. No respiratory distress.      Breath sounds: Normal breath sounds.   Abdominal:      General: Bowel sounds are normal.      Palpations: Abdomen is soft.      Comments: Across abdomen having pressure feeling   Musculoskeletal: Normal range of motion.      Right lower leg: No edema.      Left lower leg: No edema.      Comments: Left rib cage tenderness with palpation; lower back pain   Skin:     General: Skin is warm and dry.      Capillary Refill: Capillary refill takes 2 to 3 seconds.      Coloration: Skin is jaundiced and pale.      Comments: Scratches to left shoulder    Neurological:      Mental Status: He is alert.      Motor: Weakness present.      Gait: Gait abnormal.      Comments: Oriented x3 person, place, time   Psychiatric:         Mood and Affect: Mood normal.         Behavior: Behavior normal.         Thought Content: Thought content normal.         Judgment: Judgment normal.      Comments: Seems frustrated and depressed         Labs:  CBC:   WBC   Date Value Ref Range Status   07/29/2020 7.23 3.90 - 12.70 K/uL Final       Hemoglobin   Date Value Ref Range Status   07/29/2020 9.1 (L) 14.0 - 18.0 g/dL Final       Hematocrit   Date Value Ref Range Status   07/29/2020 30.4 (L) 40.0 - 54.0 % Final       Mean Corpuscular Volume   Date Value Ref Range Status   07/29/2020 84 82 - 98 fL Final       Platelets   Date Value Ref Range Status   07/29/2020 189 150 - 350 K/uL Final       LFT:   Lab Results   Component Value Date    AST 28 07/29/2020    ALKPHOS 710 (H) 07/29/2020    BILITOT 0.5 07/29/2020       Albumin:   Albumin   Date Value Ref Range Status   07/29/2020 2.9  (L) 3.5 - 5.2 g/dL Final     Protein:   Total Protein   Date Value Ref Range Status   07/29/2020 6.9 6.0 - 8.4 g/dL Final       Radiology:  I have reviewed all pertinent imaging results/findings within the past 24 hours.     Psychosocial/Cultural/Spiritual:   · F- [place] and Belief:  Confucianist   · I - Importance:  highly  · C - Community: [place] [place] [employer]  · A - Address in Care:  comes over and visits      Assessment:     1. 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 Unsteady gait    3. Palliative care encounter    4. Counseling regarding advance care planning and goals of care    5. Fatigue, unspecified type    6. Cachectic    7. Moderate protein-calorie malnutrition    8. Generalized weakness    9. Chronic neoplasm-related pain        Plan:     Ethical / Legal: Advance Care Planning   · Surrogate decision maker:  Name: Krystin Hart, Relationship: wife  · Code Status:  DNR  · [place]POST:  Left form at patient's house for him to complete  · Other advance directive:  no,   · Capacity to make medical decisions:  Yes  ·  Conflict:  none       Martin was seen today for establish care.    Assessment and Plan:    Prostate cancer metastatic to intrapelvic lymph node  -Followed by Dr. Herlinda Escobar,oncologist  -Chemo on hold at this time  -MS Contin 15mg Q 12 hours for pain  -Established palliative care  -recommend hospice referral  -DNR     Unsteady gait  -fall precautions  -ambulate with assistance  -refuses walker    Palliative care encounter  -8/19 initiated palliative care services  -Discussed [place]POST form   -left form at patient's house to complete and return to Palliative Care NP on next visit  -recommend hospice services and referral to hospice  -DNR    Counseling regarding advance care planning and goals of care  -discussed goals of care. He would like to receive chemo but doesn't want it to debilitate him  -would like to get 2nd opinion from Dr. Kirkland, oncologist      Fatigue, unspecified  type  -frequent rest periods      Cachectic  -increase protein in diet  -offer patient small frequent meals  -    Moderate protein-calorie malnutrition  -increase protein in diet, such as chicken and beans  -encourage to drink 2-3 Ensures a day  -recommend Pedialyte   -encourage small frequent meals as tolerated  -continue appetite stimulant Megace      Generalized weakness  -ambulate with assistance  -Rx shower chair, ordered from DME      Chronic neoplasm-related pain  -continue MS Contin 15mg po Q12 hours  -referral to hospice for pain management  -tylenol as needed        Were controlled substances prescribed?  No      Total face-to-face time was 90min  >50% of 60 min was spent on counseling and coordination of care. The following issues were discussed: prostate cancer, fatigue, fall, generalized weakness,    An additional 30 minutes of the visit was spent in advanced care planning. Reviewed and discussed [place]POST form and all questions regarding advance care planning. Discussed Goals of care with patient; discussed benefits and criteria for hospice      Follow Up Appointments:   Future Appointments   Date Time Provider Department Center   9/11/2020  9:30 AM Topher Blount MD [facility]       Patient and family agreed via verbal consent to access medical records and for assessment and treatment during this visit.    NP wore face mask entire length of visit.    Attestation: Screening criteria to assess the level of the patient's risk for infection with COVID-19 as recommended by the CDC at the time of the above documented home visit concluded appropriateness to proceed.     Universal precautions were maintained at all times, including provider use of >60% alcohol gel hand  immediately prior to entry and upon departing the patient's home as well as cleaning of equipment used in home visit with antibacterial/germicidal disposable wipes.    Patient has not been exposed to anyone with the virus  (to the patient's knowledge)  Patient has not been out of the country in the last 14 days.    Patient denies any of the following symptoms: headaches, fever, body aches, diarrhea    +for the following symptoms (not new symptoms) +SOB with exertion, generalized weakness, abdominal pressure      Signature:  Rachelle Hawk DNP, FNP-C  [facility]

## 2020-08-20 NOTE — TELEPHONE ENCOUNTER
"Returned call to pt.   Pt stated that Dr. Monson (?) called and informed only has 1m Lupron available (not 3m Lupron).  Pt asked if he can stop doing Lupron injections (if there is any benefit) as they are "very expensive."  Informed pt can most likely stop but would double check with MD and f/u.  Pt verbalized understanding.    Message fwd.      ----- Message from Bell Glynn sent at 8/20/2020  3:23 PM CDT -----     Consult/Advisory:    Name Of Caller: Martin   Contact Preference?:[phone]    Provider Name: Dr Escobar     What is the nature of the call?:  Pt request a callback to discuss injections of Lupron    Additional Notes:  "Thank you for all that you do for our patients'"             "

## 2020-08-31 NOTE — TELEPHONE ENCOUNTER
Returned call to pt wife.   Pt wife calling to discuss pt going through pain medication more quickly than he should.   Informed pt wife looked like pt missed virtual visit today with NP and offered r/s to tomorrow to discuss pain mgmt and refill requests.   Appt r/s to 3:30 tomorrow.  Pt wife verbalized understanding.      Message fwd.      ----- Message from Narcisa Lanier sent at 8/31/2020  4:20 PM CDT -----  Regarding: PT  Contact: PT's wife  PT's wife called to speak to one of the nurses regarding one of the PT's medications. Please call back     Callback: [phone]

## 2020-09-01 NOTE — PROGRESS NOTES
Subjective:       Patient ID: Martin Alves is a 54 y.o. male.    Chief Complaint: Follow-up and Pain    HPI   The patient location is: home  The chief complaint leading to consultation is: follow up prostate cancer  Unable to come due to pain    Visit type: audiovisual    Face to Face time with patient: 25 minutes  25 minutes of total time spent on the encounter, which includes face to face time and non-face to face time preparing to see the patient (eg, review of tests), Obtaining and/or reviewing separately obtained history, Documenting clinical information in the electronic or other health record, Independently interpreting results (not separately reported) and communicating results to the patient/family/caregiver, or Care coordination (not separately reported).     Each patient to whom he or she provides medical services by telemedicine is:  (1) informed of the relationship between the physician and patient and the respective role of any other health care provider with respect to management of the patient; and (2) notified that he or she may decline to receive medical services by telemedicine and may withdraw from such care at any time.    Notes:     1. Increasing pain in the back.  This has gotten worse of the last several days.   He is currently taking MS contin 15 mg every 12 hours; takes a 5 mg of oxycodone daily  Uses heat and ice; when he lays still the pain is manageable as soon as he moves it is a 9 out of 10.    Remains on steroids.     2. Dysuria and frequency  Better on medication    3. Appetite  States no appetite at all  He is hungry feels like he wants to eat, but takes a few bits and then he is done.   He is trying to push himself  States he is eating whatever he can tolerate; soft foods  Denies dysphagia- states takes forever to chew  Weight unknown: last weight - 136 lbs - feels he has lost weight since then    4. Fatigue- sleeps more; naps frequently  Rare headaches  Denies dizziness;  better after XRT  Vision - stable, occasionally needs to close one eye to focus  Smoking marijuana- states it helps with pain and anxiety    5. Bowel Regimen  Two bowel movements yesterday  On a bowel regimen      Review of Systems   Constitutional: Positive for activity change, appetite change (decreased), fatigue and unexpected weight change (decreasing). Negative for chills, diaphoresis and fever.   Respiratory: Positive for shortness of breath (with exertion). Negative for cough and wheezing.    Cardiovascular: Negative for chest pain, palpitations and leg swelling.   Gastrointestinal: Positive for constipation (on bowel regimen) and nausea. Negative for abdominal distention, abdominal pain, change in bowel habit, diarrhea, vomiting and change in bowel habit.   Genitourinary: Negative for decreased urine volume, difficulty urinating and dysuria.   Musculoskeletal: Positive for arthralgias, back pain and gait problem. Negative for myalgias and neck pain.   Neurological: Positive for weakness and numbness. Negative for dizziness, seizures, light-headedness and headaches.   Hematological: Negative for adenopathy. Does not bruise/bleed easily.   Psychiatric/Behavioral: Positive for dysphoric mood and sleep disturbance. The patient is nervous/anxious.          Objective:      Physical Exam  Constitutional:       Comments: Virtual Visit         Assessment:       1. 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 Bone metastases    3. Neoplasm related pain    4. Antineoplastic chemotherapy induced anemia        Plan:   1-3. Will increase MS Contin to 3 times a day; currently taking BID  Will take oxycodone every 4 hours as needed; Currently taking daily  Will reassess on Friday and determine if he requires an increase in MS contin for long acting pain control  4. Will monitor if needed    Return to clinic in 1 week with DEONTE appointment.     Patient is in agreement with the proposed treatment plan. All  questions were answered to the patient's satisfaction. Patient knows to call clinic for any new or worsening symptoms and if anything is needed before the next clinic visit.          Rosario Grant, JOSELNYP-C  Hematology & Medical Oncology   [facility] [place] [address]  ph. [phone]  Fax. [phone]    Patient dicussed with collaborating physician, Dr. Escobar.

## 2020-09-01 NOTE — TELEPHONE ENCOUNTER
"Returned call to Bambi with [facility].   Bambi stated that pt just received rx for 60 tablets on 8/17 and was taking more than TID.   Bambi stated that she cannot fill rx as "too soon for insurance and pt is in severe pain."  Informed Bambi of NP's plan as had virtual audio appt with pt this morning- to which Bambi stated "maybe you didn't hear me..."  Assured Bambi that nurse understood and was advising of NP's plan and recommendation (would re-assess on Friday) and would discuss with NP and f/u again.   Bambi verbalized understanding.       Message fwd.           ----- Message from Enma Diego sent at 9/1/2020  2:50 PM CDT -----  Regarding: Increase in Frequency  Contact: Ms Bass Velasquez [phone]  Type: Increase Frequency    Who Called: Ms Bass states the last  prescription  received Morphine was on  8/17/2020 for quantity of with 3 day frequency   patient out of medication.   Symptoms (please be specific):   How long has patient had these symptoms:   Pharmacy name and phone #: [facility]  Would the patient rather a call back or a response via [facility]? call  Best Call Back Number: [phone]  Additional Information:          "

## 2020-09-01 NOTE — PROGRESS NOTES
Subjective:       Patient ID: Martin Alves is a 54 y.o. male.    Chief Complaint: No chief complaint on file.    HPI   The patient location is: home  The chief complaint leading to consultation is: follow up prostate cancer and pain control  Unable to come due to pain    Visit type: audiovisual    Face to Face time with patient: 10 minutes  20 minutes of total time spent on the encounter, which includes face to face time and non-face to face time preparing to see the patient (eg, review of tests), Obtaining and/or reviewing separately obtained history, Documenting clinical information in the electronic or other health record, Independently interpreting results (not separately reported) and communicating results to the patient/family/caregiver, or Care coordination (not separately reported).     Each patient to whom he or she provides medical services by telemedicine is:  (1) informed of the relationship between the physician and patient and the respective role of any other health care provider with respect to management of the patient; and (2) notified that he or she may decline to receive medical services by telemedicine and may withdraw from such care at any time.    Notes:     1. Increasing pain in the back.  Notes that his pain has gotten a little bit better. He is taking the MS Contin 15 mg every 8 hours.   He has been taken the 5 mg of oxycodone more often.  Wife notes that he has good night and not so good nights, but better since increasing the pain medication.  Uses ice with relief.   Remains on steroids.     2. Dysuria and frequency  Better on medication    3. Appetite  States no appetite at all  He is hungry feels like he wants to eat, but takes a few bits and then he is done.   He is trying to push himself  States he is eating whatever he can tolerate; soft foods  Denies dysphagia- states takes forever to chew  Weight unknown: last weight - 136 lbs - feels he has lost weight since then    4.  Fatigue- Sleeping well  Rare headaches  Denies dizziness; better after XRT  Vision - stable, occasionally needs to close one eye to focus  Smoking marijuana- states it helps with pain and anxiety    5. Bowel Regimen  On a bowel regimen      Review of Systems   Constitutional: Positive for activity change, appetite change (decreased), fatigue and unexpected weight change (decreasing). Negative for chills, diaphoresis and fever.   Respiratory: Positive for shortness of breath (with exertion). Negative for cough and wheezing.    Cardiovascular: Negative for chest pain, palpitations and leg swelling.   Gastrointestinal: Positive for constipation (on bowel regimen) and nausea. Negative for abdominal distention, abdominal pain, change in bowel habit, diarrhea, vomiting and change in bowel habit.   Genitourinary: Negative for decreased urine volume, difficulty urinating and dysuria.   Musculoskeletal: Positive for arthralgias, back pain and gait problem. Negative for myalgias and neck pain.   Neurological: Positive for weakness and numbness. Negative for dizziness, seizures, light-headedness and headaches.   Hematological: Negative for adenopathy. Does not bruise/bleed easily.   Psychiatric/Behavioral: Positive for sleep disturbance. Negative for dysphoric mood. The patient is not nervous/anxious.          Objective:      Physical Exam  Constitutional:       Comments: Virtual Visit         Assessment:       1. 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 Bone metastases    3. Neoplasm related pain        Plan:   1-3. Continue MS Contin to 3 times a day; currently taking BID  Continue oxycodone every 4 hours as needed    4. Will monitor if needed.     Patient has sought a second opinion with Dr. Hoskins in [facility] and is deciding if he will follow with up with him or with us. I notified the patient and his wife that, at this time we will not make a follow up appointment. Both verbalized understanding. They  will contact us if they decide to proceed with care at [facility]. Also let patient and wife know that we are supportive of the decision to seek a second opinion.     Patient is in agreement with the proposed treatment plan. All questions were answered to the patient's satisfaction. Patient knows to call clinic for any new or worsening symptoms and if anything is needed before the next clinic visit.          Rosario Grant, JOSELYNP-C  Hematology & Medical Oncology   [facility] [place] [address]  ph. [phone]  Fax. [phone]    Patient dicussed with collaborating physician, Dr. Escobar.

## 2020-09-01 NOTE — TELEPHONE ENCOUNTER
Message fwd to NP.       ----- Message from Helen Leyva sent at 9/1/2020 10:22 AM CDT -----  Pt called in, he had a virtual visit scheduled today at 10 am and he had issues with getting on to it. Pt stated that he still needs the appt and is asking for a return call back asap  please.    Pt can be reached at [phone]    Thank you

## 2020-09-03 NOTE — TELEPHONE ENCOUNTER
----- Message from Miladis Fernandez sent at 9/3/2020 12:13 PM CDT -----  Regarding: Request a call back  Pt called to speak with the doctor concerning pt back with the radiation.     Pt wife Krystin would like to ask a couple question and could be reached at [phone]      Thank you!

## 2020-09-07 NOTE — PATIENT INSTRUCTIONS
FOLLOW-UP INSTRUCTIONS:    1. Follow-up with palliative care NP in 2-4 weeks on 9/15/2020  2. Continue all medications, treatments, and therapies as ordered  3. Fall precautions at all times  4. Maintain Safety precautions at all times  5. Attend all medical appointments as scheduled  6. F/u with PCP as needed  7. Patient to have 6 feet between each other  8. In an Emergency, call 911 or go to ED; notify PCP's office  9. Recommend referral to hospice for evaluation  10. Offer patient small frequent meals  11. Continue Steroid and Megace appetite stimulant  12. Limit Risks of environmental exposure to coronavirus as discussed including: social distancing, hand hygiene, and limiting departures from the home for necessities only.

## 2020-09-22 NOTE — TELEPHONE ENCOUNTER
9/22/2020    Spoke with bennett Jackson. Family have agreed to a hospice referral at this time.    Order placed in the computer for [facility] referral for evaluation and admission.    Rachelle Hawk DNP, FNP-C  [email or web address]

## 2020-09-29 NOTE — ED NOTES
Patient given ice pack and urinal at his request.  Awaiting oncology consult.  No new needs aside from these.  Will continue to monitor.

## 2020-09-29 NOTE — ED PROVIDER NOTES
Encounter Date: 9/29/2020       History     Chief Complaint   Patient presents with    Fall     hx cancer all over in bones, liver etc, fell last week, on blood thinners, L arm keeps bleeding     Martin Alves is a 56yo with a PMHx of metastatic prostate cancer to his bone who presents with a left forearm laceration. The patient fell last week at home while walking to the bathroom due to his left knee giving out and he hit his left forearm on a doorframe resulting in a laceration. He was using a cane when he fell, he has a walker at home which he has been using since the fall. Denies any LOC and he did not hit his head during the fall. The laceration on his arm was bleeding steadily initially but the patient kept it wrapped with gauze and neosporin. Up until yesterday the bleeding had almost completely stopped. Yesterday he believes there was not enough neosporin on the gauze and it became stuck to the wound. When he moved his arm he felt the skin separate again and it hasnt stopped bleeding. Patient was recently started on eliquis after two small PE's were incidentally found on a bone scan, but he did not take it this morning due to the bleeding. Patient also has a history of anemia and required one unit of pRBC two weeks ago when he went to his oncology appointment. Patient denies any headaches, lightheadedness, worsening fatigue, fevers, or chills. Patient is not currently undergoing any treatment for the prostate cancer and is supposed to begin home hospice today but they came to the ED when his laceration didn't stop bleeding.     The history is provided by the patient and a relative.     Review of patient's allergies indicates:  No Known Allergies  Past Medical History:   Diagnosis Date    Prostate cancer     Chemo in 2016     Past Surgical History:   Procedure Laterality Date    PORTACATH PLACEMENT       Family History   Problem Relation Age of Onset    Emphysema Mother     Heart attack Father     No  Known Problems Brother     Cerebral aneurysm Maternal Aunt     No Known Problems Maternal Uncle     No Known Problems Paternal Aunt     No Known Problems Paternal Uncle     No Known Problems Maternal Grandmother     No Known Problems Maternal Grandfather     Colon cancer Paternal Grandmother     No Known Problems Paternal Grandfather     No Known Problems Brother     No Known Problems Son     No Known Problems Son      Social History     Tobacco Use    Smoking status: Former Smoker     Packs/day: 0.50     Years: 40.00     Pack years: 20.00     Types: Cigarettes     Start date: 1978     Quit date: 2018     Years since quittin.8    Smokeless tobacco: Former User     Types: Chew     Quit date: 2017   Substance Use Topics    Alcohol use: Yes     Alcohol/week: 1.0 standard drinks     Types: 1 Cans of beer per week     Comment: Occasional    Drug use: No     Review of Systems   Constitutional: Negative for activity change, appetite change, chills, fatigue and fever.   HENT: Negative for congestion and sore throat.    Eyes: Negative for pain.   Respiratory: Negative for cough and shortness of breath.    Cardiovascular: Negative for chest pain, palpitations and leg swelling.   Gastrointestinal: Positive for constipation. Negative for abdominal distention and abdominal pain.   Genitourinary: Negative for dysuria and frequency.   Musculoskeletal:        Bone pain due to metastatic disease    Skin: Positive for wound.        Left lateral forearm    Neurological: Negative for light-headedness and headaches.   Hematological: Bruises/bleeds easily (On eliquis ).   Psychiatric/Behavioral: Negative for agitation.       Physical Exam     Initial Vitals [20 0934]   BP Pulse Resp Temp SpO2   (!) 116/56 74 18 97.6 °F (36.4 °C) 99 %      MAP       --         Physical Exam    Constitutional: He appears cachectic.   HENT:   Head: Normocephalic.   Scar on forehead from previous fall   Eyes: EOM are  normal. Pupils are equal, round, and reactive to light.   Neck: Normal range of motion. Neck supple.   Cardiovascular: Normal rate, regular rhythm, normal heart sounds and intact distal pulses.   Pulmonary/Chest: Breath sounds normal.   Abdominal: Soft. He exhibits no distension. There is no abdominal tenderness.   Musculoskeletal: Normal range of motion.   Neurological: He is alert and oriented to person, place, and time.   Skin: Skin is warm. Laceration noted.        Psychiatric: He has a normal mood and affect.         ED Course   Procedures  Labs Reviewed   CBC W/ AUTO DIFFERENTIAL - Abnormal; Notable for the following components:       Result Value    RBC 2.75 (*)     Hemoglobin 7.9 (*)     Hematocrit 26.3 (*)     Mean Corpuscular Hemoglobin Conc 30.0 (*)     RDW 19.5 (*)     Platelets 43 (*)     All other components within normal limits   BASIC METABOLIC PANEL          Imaging Results    None          Medical Decision Making:   Initial Assessment:   Patient is a 56yo with metastatic prostate cancer to the bone who presents with a left forearm laceration from a fall last week that will not stop bleeding. Patient has a history of anemia and was recently started on eliquis for incidental PE's found on a bone scan.   Differential Diagnosis:   Forearm laceration   Clinical Tests:   Lab Tests: Reviewed  The following lab test(s) were unremarkable: CBC and BMP       <> Summary of Lab: Patient has thrombocytopenia. Likely due to marrow involvement of his metastatic prostate cancer.   Radiological Study: Reviewed  Medical Tests: Reviewed  ED Management:  Wound was cleaned with sterile saline and hemostatic dressing was apllied. BMP unremarkable. CBC revealed a Hg of 7.9 and platelets of 43. Spoke to oncology about patients thrombocytopenia and they believe it is due to marrow involvement of his metastatic prostate cancer. No acute management indicated at this time. They also recommend for patient to stop taking the  eliquis.               Attending Attestation:   Physician Attestation Statement for Resident:  As the supervising MD   Physician Attestation Statement: I have personally seen and examined this patient.   I agree with the above history. -: 55 M hx above here with persistent bleeding of his left arm for the past 1 week after a fall.  Patient has a small skin tear, has tried multiple bandages as well as Vaseline.  Initially staffed with started rebleeding yesterday.  Does complain of weakness, which is baseline.  Did hit his head, denies LOC, headache, change in vision, nausea or vomiting.   As the supervising MD I agree with the above PE.   -: General:  Chronically ill-appearing male, no acute distress  HEENT: (+) right frontal ecchymosis with linear abrasion, no hematoma.  HI, EOMI, no nasal tenderness, dry MM  Lungs: CTA  Cardiac: RRR  Abd: soft, nontender  Ext: left arm superficial abrasion w active oozing, no bony tenderness     As the supervising MD I agree with the above treatment, course, plan, and disposition.   -: Quick clot with pressure dressing placed.  Bleeding controlled.    Labs concerning for new onset anemia 7.9, thrombocytopenia a 43.  Oncology consulted    Oncology evaluated patient, feel that new onset thrombocytopenia and anemia is likely secondary to bone marrow involvement.  No acute interventions needed at this time.  Patient will be discharged with niece at bedside to continue with plan for home hospice.  I have reviewed and agree with the residents interpretation of the following: lab data.                              Clinical Impression:     ICD-10-CM ICD-9-CM   1. Thrombocytopenia  D69.6 287.5   2. Abrasion of left upper extremity, initial encounter  S40.812A 913.0   3. Anemia, unspecified type  D64.9 285.9                                               Eloy Sousa MD  Resident  09/29/20 1653       Varsha Dela Cruz MD  09/30/20 1201

## 2020-09-29 NOTE — ED NOTES
Martin Alves, a 55 y.o. male presents to the ED via personal transport from home with CC Fall one week ago, resulting in a wound to the left arm. Pt. Reports that beginning two days ago, the wound began to bleed and he has not been able to get it to stop.       Patient identifiers verified verbally with patient and correct for Martin Alves.    LOC/ APPEARANCE: The patient is AAOx4. Pt is speaking appropriately, no slurred speech. Pt changed into hospital gown. Continuous cardiac monitor, cont pulse ox, and auto BP cuff applied to patient. Pt is clean and well groomed. No JVD visible. Pt reports pain level of 0. Pt updated on POC. Bed low and locked with side rails up x2, call bell in pt reach.  SKIN: Skin is warm and dry. Pt. Appears jaundice. Capillary refill <3 seconds. No breakdown or brusing visible. Mucus membranes moist, acyanotic. Pt. Has wound to his left forearm, from a fall.   RESPIRATORY: Airway is open and patent. Respirations-spontaneous, unlabored, regular rate, equal bilaterally on inspiration and expiration. No accessory muscle use noted. Lungs clear to auscultation in all fields bilaterally anterior and posterior.   CARDIAC: Patient has regular heart rate.  No peripheral edema noted, and patient has no c/o chest pain. Peripheral pulses present equal and strong throughout.  ABDOMEN: Soft and non-tender to palpation with no distention noted. Normoactive bowel sounds x4 quadrants. Pt has no complaints of abnormal bowel movements, denies blood in stool. Pt reports normal appetite.   NEUROLOGIC: Eyes open spontaneously and facial expression symmetrical. Pt behavior appropriate to situation, and pt follows commands. Pt reports sensation present in all extremities when touched with a finger, denies any numbness or tingling. PERRLA  MUSCULOSKELETAL: Spontaneous movement noted to all extremities. Hand  equal and leg strength strong +5 bilaterally.   : No complaints of frequency,  burning, urgency or blood in the urine. No complaints of incontinence.

## 2020-09-29 NOTE — ED TRIAGE NOTES
Pt. Is a 55 y.o. male reporting a fall one week ago. He has an abrasion on his left forearm as a result of the fall. He is reporting that beginning two ago, the wound started to bleed and will not stop. Pt. On eliquis.

## 2020-09-29 NOTE — DISCHARGE INSTRUCTIONS
Per oncology, stop eliquis  Continue compression dressing. If continues to ooze, place clotting cloth.

## 2020-10-06 NOTE — TELEPHONE ENCOUNTER
"Returned call to pt wife.   Pt wife stated that pt has decided to enroll in hospice and she would like Dr. Escobar to fill out STD paperwork for pt's work since Dr. Escobar is more familiar with his care.   Pt wife will drop off at  in next couple of days.  Nurse will keep an eye out for paperwork.  Pt wife verbalized understanding.       ----- Message from Bell Glynn sent at 10/6/2020 11:27 AM CDT -----  Consult/Advisory:    Name Of Caller: Krystin  Contact Preference?: [phone]    What is the nature of the call?:  Pt would like a callback to discuss paperwork for FMLA & short term disability     Additional Notes:  "Thank you for all that you do for our patients'"             "

## 2020-10-12 NOTE — TELEPHONE ENCOUNTER
"Returned call to pt wife.   Pt wife calling to check on [facility] paperwork.   Informed pt wife that paperwork was sent to [place] dept this AM and dept to call when ready for .   Provided pt wife dept phone number for status updates.   Pt wife verbalized understanding and thanked nurse.        ----- Message from Raine Huerta sent at 10/12/2020 10:28 AM CDT -----  Patient Assist    Name of caller: spouse  Contact Preference: [phone]  Does Patient feel the need to see the MD today? No   Physician:  Shawn FAARH MD  What is the nature of the call?    - called regarding disability documents and paperwork . Please karma     Additional Notes:   "Thank you for all that you do for our patients'"            "

## 2020-10-13 NOTE — TELEPHONE ENCOUNTER
"Returned call to pt spouse.   Pt spouse calling to inform received FMLA paperwork and has additional paperwork for pt's life insurance her daughter is currently in route to drop off.   Nurse verbalized understanding and will wait for documents.      ----- Message from Raine Huerta sent at 10/13/2020 12:17 PM CDT -----  Patient Assist    Name of caller:  spouse   Contact Preference:  [phone]  Does Patient feel the need to see the MD today? No   Physician:  Shawn FARAH MD   What is the nature of the call?    - called and asked to speak with the nurse. Please call     Additional Notes:   "Thank you for all that you do for our patients'"            "

## 2020-10-16 NOTE — TELEPHONE ENCOUNTER
Returned call to pt wife, Krystin.   Krystin calling to check on life insurance paperwork.   Informed Krystin that paperwork was faxed to our FMLA/Disability dept yesterday and provided pt w/ dept phone number for updates.   Krystin verbalized understanding and thanked nurse.       ----- Message from Corey Rodriguez sent at 10/16/2020 12:52 PM CDT -----  Contact: Krystin (spouse)  Patient Advice/Staff Message     Caller name/title: Krystin (spouse)    Provider: Shawn    Reason for call: Calling to speak with the RN to f/u on paperwork that the doctor was suppose to be filling out for the pt     Do you feel you need to be seen today:: No        Communication Preference: [phone]    Additional Information: